# Patient Record
Sex: FEMALE | Race: OTHER | HISPANIC OR LATINO | Employment: FULL TIME | URBAN - METROPOLITAN AREA
[De-identification: names, ages, dates, MRNs, and addresses within clinical notes are randomized per-mention and may not be internally consistent; named-entity substitution may affect disease eponyms.]

---

## 2018-07-19 ENCOUNTER — HOSPITAL ENCOUNTER (EMERGENCY)
Facility: HOSPITAL | Age: 28
Discharge: HOME | End: 2018-07-19
Admitting: EMERGENCY MEDICINE
Payer: COMMERCIAL

## 2018-07-19 VITALS
SYSTOLIC BLOOD PRESSURE: 124 MMHG | DIASTOLIC BLOOD PRESSURE: 75 MMHG | RESPIRATION RATE: 20 BRPM | HEART RATE: 90 BPM | BODY MASS INDEX: 22.67 KG/M2 | WEIGHT: 120 LBS | OXYGEN SATURATION: 99 % | TEMPERATURE: 99.3 F

## 2018-07-19 DIAGNOSIS — K62.89 ANAL OR RECTAL PAIN: ICD-10-CM

## 2018-07-19 DIAGNOSIS — E87.6 HYPOKALEMIA: ICD-10-CM

## 2018-07-19 DIAGNOSIS — K62.5 BRBPR (BRIGHT RED BLOOD PER RECTUM): Primary | ICD-10-CM

## 2018-07-19 DIAGNOSIS — K64.4 HEMORRHOIDS, EXTERNAL WITHOUT COMPLICATIONS: ICD-10-CM

## 2018-07-19 DIAGNOSIS — D64.9 ANEMIA: ICD-10-CM

## 2018-07-19 DIAGNOSIS — Z87.19 HISTORY OF HEMORRHOIDS: ICD-10-CM

## 2018-07-19 LAB
ANION GAP SERPL CALCULATED.3IONS-SCNC: 6 MMOL/L (ref 4–13)
BASOPHILS # BLD AUTO: 0.05 THOUSANDS/ΜL (ref 0–0.1)
BASOPHILS NFR BLD AUTO: 1 % (ref 0–1)
BUN SERPL-MCNC: 10 MG/DL (ref 5–25)
CALCIUM SERPL-MCNC: 9.2 MG/DL (ref 8.3–10.1)
CHLORIDE SERPL-SCNC: 102 MMOL/L (ref 100–108)
CO2 SERPL-SCNC: 29 MMOL/L (ref 21–32)
CREAT SERPL-MCNC: 0.82 MG/DL (ref 0.6–1.3)
EOSINOPHIL # BLD AUTO: 0.08 THOUSAND/ΜL (ref 0–0.61)
EOSINOPHIL NFR BLD AUTO: 2 % (ref 0–6)
ERYTHROCYTE [DISTWIDTH] IN BLOOD BY AUTOMATED COUNT: 16.6 % (ref 11.6–15.1)
EXT PREG TEST URINE: NEGATIVE
GFR SERPL CREATININE-BSD FRML MDRD: 98 ML/MIN/1.73SQ M
GLUCOSE SERPL-MCNC: 100 MG/DL (ref 65–140)
HCT VFR BLD AUTO: 30.8 % (ref 34.8–46.1)
HGB BLD-MCNC: 9.2 G/DL (ref 11.5–15.4)
LYMPHOCYTES # BLD AUTO: 2.5 THOUSANDS/ΜL (ref 0.6–4.47)
LYMPHOCYTES NFR BLD AUTO: 47 % (ref 14–44)
MCH RBC QN AUTO: 20.7 PG (ref 26.8–34.3)
MCHC RBC AUTO-ENTMCNC: 29.9 G/DL (ref 31.4–37.4)
MCV RBC AUTO: 69 FL (ref 82–98)
MONOCYTES # BLD AUTO: 0.53 THOUSAND/ΜL (ref 0.17–1.22)
MONOCYTES NFR BLD AUTO: 10 % (ref 4–12)
NEUTROPHILS # BLD AUTO: 2.16 THOUSANDS/ΜL (ref 1.85–7.62)
NEUTS SEG NFR BLD AUTO: 41 % (ref 43–75)
NRBC BLD AUTO-RTO: 0 /100 WBCS
PLATELET # BLD AUTO: 261 THOUSANDS/UL (ref 149–390)
PMV BLD AUTO: 9 FL (ref 8.9–12.7)
POTASSIUM SERPL-SCNC: 3.1 MMOL/L (ref 3.5–5.3)
RBC # BLD AUTO: 4.45 MILLION/UL (ref 3.81–5.12)
SODIUM SERPL-SCNC: 137 MMOL/L (ref 136–145)
WBC # BLD AUTO: 5.32 THOUSAND/UL (ref 4.31–10.16)

## 2018-07-19 PROCEDURE — 36415 COLL VENOUS BLD VENIPUNCTURE: CPT | Performed by: PHYSICIAN ASSISTANT

## 2018-07-19 PROCEDURE — 80048 BASIC METABOLIC PNL TOTAL CA: CPT | Performed by: PHYSICIAN ASSISTANT

## 2018-07-19 PROCEDURE — 81025 URINE PREGNANCY TEST: CPT | Performed by: PHYSICIAN ASSISTANT

## 2018-07-19 PROCEDURE — 99285 EMERGENCY DEPT VISIT HI MDM: CPT

## 2018-07-19 PROCEDURE — 85025 COMPLETE CBC W/AUTO DIFF WBC: CPT | Performed by: PHYSICIAN ASSISTANT

## 2018-07-19 RX ORDER — POTASSIUM CHLORIDE 20MEQ/15ML
40 LIQUID (ML) ORAL ONCE
Status: COMPLETED | OUTPATIENT
Start: 2018-07-19 | End: 2018-07-19

## 2018-07-19 RX ADMIN — POTASSIUM CHLORIDE 40 MEQ: 20 SOLUTION ORAL at 21:06

## 2018-07-19 NOTE — ED PROVIDER NOTES
History  Chief Complaint   Patient presents with    Rectal Bleeding     "I know i have internal and external hemmoroids " "I have been bleeding with bowel movements since monday "       History provided by:  Spouse and patient   used: No    Rectal Bleeding - Minor   Quality:  Bright red  Amount: Moderate  Timing:  Intermittent  Chronicity:  Recurrent  Context: defecation, hemorrhoids, rectal pain and spontaneously    Context: not anal fissures, not anal penetration, not constipation, not diarrhea, not foreign body and not rectal injury    Pain details:     Quality:  Aching and burning    Severity:  Mild    Timing:  Constant    Progression:  Unchanged  Similar prior episodes: yes    Relieved by:  Hemorrhoid cream (Rectal suppository)  Worsened by:  Nothing  Ineffective treatments:  None tried  Associated symptoms: light-headedness    Associated symptoms: no abdominal pain, no fever, no recent illness and no vomiting    Associated symptoms comment:  One episode of lightheadedness yesterday  Risk factors: no anticoagulant use, no hx of colorectal cancer, no hx of colorectal surgery, no hx of IBD, no liver disease and no NSAID use        None       History reviewed  No pertinent past medical history  History reviewed  No pertinent surgical history  History reviewed  No pertinent family history  I have reviewed and agree with the history as documented  Social History   Substance Use Topics    Smoking status: Never Smoker    Smokeless tobacco: Never Used    Alcohol use No        Review of Systems   Constitutional: Negative for fever  HENT: Negative for dental problem and mouth sores  Eyes: Negative for pain  Respiratory: Negative for cough, choking, chest tightness and shortness of breath  Cardiovascular: Negative for chest pain and leg swelling  Gastrointestinal: Positive for blood in stool, hematochezia and rectal pain  Negative for abdominal pain and vomiting  Endocrine: Negative for polydipsia and polyphagia  Genitourinary: Positive for menstrual problem  Negative for dysuria, enuresis, flank pain, pelvic pain, vaginal bleeding, vaginal discharge and vaginal pain  Irregular menses  Musculoskeletal: Negative for arthralgias, back pain and neck stiffness  Skin: Negative for rash  Allergic/Immunologic: Negative for food allergies  Neurological: Positive for light-headedness  Hematological: Negative for adenopathy  Physical Exam  Physical Exam   Constitutional: She appears well-developed and well-nourished  No distress  HENT:   Head: Normocephalic and atraumatic  Eyes: Conjunctivae are normal    Neck: Neck supple  No JVD present  Cardiovascular: Normal rate and regular rhythm  Pulmonary/Chest: Effort normal  No respiratory distress  Respiratory rate for me was 16  No distress appreciated on exam    Abdominal: Soft  She exhibits no distension and no mass  There is no rebound and no guarding  Genitourinary: Rectal exam shows external hemorrhoid, tenderness and guaiac positive stool  Rectal exam shows no fissure, no mass and anal tone normal        Pelvic exam was performed with patient prone  Musculoskeletal: She exhibits no edema or deformity  Neurological: She is alert  Skin: Skin is warm and dry  Capillary refill takes less than 2 seconds  She is not diaphoretic  Psychiatric: She has a normal mood and affect  Nursing note and vitals reviewed        Vital Signs  ED Triage Vitals [07/19/18 1851]   Temperature Pulse Respirations Blood Pressure SpO2   99 3 °F (37 4 °C) 101 20 139/69 99 %      Temp src Heart Rate Source Patient Position - Orthostatic VS BP Location FiO2 (%)   -- Monitor -- Right arm --      Pain Score       3           Vitals:    07/19/18 1851 07/19/18 2047   BP: 139/69 124/75   Pulse: 101 90       Visual Acuity      ED Medications  Medications   potassium chloride 10 % oral solution 40 mEq (40 mEq Oral Given 7/19/18 2106)       Diagnostic Studies  Results Reviewed     Procedure Component Value Units Date/Time    Basic metabolic panel [[id number]]  (Abnormal) Collected:  07/19/18 1939    Lab Status:  Final result Specimen:  Blood from Arm, Right Updated:  07/19/18 1955     Sodium 137 mmol/L      Potassium 3 1 (L) mmol/L      Chloride 102 mmol/L      CO2 29 mmol/L      Anion Gap 6 mmol/L      BUN 10 mg/dL      Creatinine 0 82 mg/dL      Glucose 100 mg/dL      Calcium 9 2 mg/dL      eGFR 98 ml/min/1 73sq m     Narrative:         National Kidney Disease Education Program recommendations are as follows:  GFR calculation is accurate only with a steady state creatinine  Chronic Kidney disease less than 60 ml/min/1 73 sq  meters  Kidney failure less than 15 ml/min/1 73 sq  meters      CBC and differential [[id number]]  (Abnormal) Collected:  07/19/18 1939    Lab Status:  Final result Specimen:  Blood from Arm, Right Updated:  07/19/18 1946     WBC 5 32 Thousand/uL      RBC 4 45 Million/uL      Hemoglobin 9 2 (L) g/dL      Hematocrit 30 8 (L) %      MCV 69 (L) fL      MCH 20 7 (L) pg      MCHC 29 9 (L) g/dL      RDW 16 6 (H) %      MPV 9 0 fL      Platelets 539 Thousands/uL      nRBC 0 /100 WBCs      Neutrophils Relative 41 (L) %      Lymphocytes Relative 47 (H) %      Monocytes Relative 10 %      Eosinophils Relative 2 %      Basophils Relative 1 %      Neutrophils Absolute 2 16 Thousands/µL      Lymphocytes Absolute 2 50 Thousands/µL      Monocytes Absolute 0 53 Thousand/µL      Eosinophils Absolute 0 08 Thousand/µL      Basophils Absolute 0 05 Thousands/µL     POCT pregnancy, urine [[id number]]  (Normal) Resulted:  07/19/18 1942    Lab Status:  Final result Updated:  07/19/18 1942     EXT PREG TEST UR (Ref: Negative) negative                 No orders to display              Procedures  Procedures       Phone Contacts  ED Phone Contact    ED Course                               MDM  Number of Diagnoses or Management Options  Anal or rectal pain:   Anemia:   BRBPR (bright red blood per rectum):   Hemorrhoids, external without complications:   History of hemorrhoids:   Hypokalemia:   Diagnosis management comments: 18-year-old female presents emergency department for bright red blood per rectum ongoing for approximately a week  Patient does admit that the blood will be over the stool but will see some blood clots intermittently  Vital signs reviewed  I did repeat vital signs myself  Patient's heart rate is less than 100  Her respirations are less than 20 more like 16  On exam patient does have a nonthrombosed external hemorrhoid which is nontender  Patient does have some tenderness of the rectum more so para to the right  No ralph mass was appreciated  The Hemoccult was positive  Patient does have some labs in our system from 2015 but at this time cannot necessarily say that her recent anemia is secondary to blood clots or bleeding from the rectum  Patient does appear to have an issue with her menses as well  Labs do suggest chronicity of her anemia  Patient is hypokalemic and was provided [employer] [employer] or in the department  Patient encouraged to drink coconut water for potassium repletion  At this time patient is felt stable for discharge to home will provide outpatient follow-up with surgery and/or colorectal   Will also provide [facility] as well as [facility] information as patient new to [facility] system  Educated patient on persistent or worsening signs symptoms any symptoms of syncope or pre symptom go pee or any further issues to either follow up with primary care [facility] surgery and/or colorectal   Patient and male significant other admit to understanding and agreement  Patient was discharged in ambulatory stable condition         Amount and/or Complexity of Data Reviewed  Clinical lab tests: ordered and reviewed      CritCare Time    Disposition  Final diagnoses: BRBPR (bright red blood per rectum)   History of hemorrhoids   Hemorrhoids, external without complications   Anal or rectal pain   Anemia   Hypokalemia     Time reflects when diagnosis was documented in both MDM as applicable and the Disposition within this note     Time User Action Codes Description Comment    7/19/2018  9:01 PM Giorgi Bartlett Add [K62 5] BRBPR (bright red blood per rectum)     7/19/2018  9:01 PM Giorgi Bartlett Add [Z87 19] History of hemorrhoids     7/19/2018  9:02 PM Giorgi Bartlett Add [K64 4] Hemorrhoids, external without complications     7/47/1001  9:02 PM Giorgi Bartlett Add [K62 89] Anal or rectal pain     7/19/2018  9:02 PM Giorgi Bartlett Add [D64 9] Anemia     7/19/2018  9:02 PM Giorgi Bartlett Add [E87 6] Hypokalemia       ED Disposition     ED Disposition Condition Comment    Discharge  Susana Delacruz discharge to home/self care  Condition at discharge: Stable        Follow-up Information     Follow up With Specialties Details Why [address] [facility]   [address] [place] [address]  [phone]  [facility] [address] Obstetrics and Gynecology   [facility] [address] [id number]  [address] [place] [address], [address] [place] [address] [address]      Corrine Berkowitz MD Colon and Rectal Surgery   [address] [facility] [address]  [phone]            There are no discharge medications for this patient  No discharge procedures on file      ED Provider  Electronically Signed by           Karlene Rubio PA-C  07/19/18 2144

## 2018-07-20 NOTE — DISCHARGE INSTRUCTIONS
Anemia   WHAT YOU NEED TO KNOW:   Anemia is a low number of red blood cells or a low amount of hemoglobin in your red blood cells  Hemoglobin is a protein that helps carry oxygen throughout your body  Red blood cells use iron to create hemoglobin  Anemia may develop if your body does not have enough iron  It may also develop if your body does not make enough red blood cells or they die faster than your body can make them  DISCHARGE INSTRUCTIONS:   Call 911 or have someone call 911 for any of the following:   · You lose consciousness  · You have severe chest pain  Return to the emergency department if:   · You have dark or bloody bowel movements  Contact your healthcare provider if:   · Your symptoms are worse, even after treatment  · You have questions or concerns about your condition or care  Medicines:   · Iron or folic acid supplements  help increase your red blood cell and hemoglobin levels  · Vitamin B12 injections  may help boost your red blood cell level and decrease your symptoms  Ask your healthcare provider how to inject B12  · Take your medicine as directed  Contact your healthcare provider if you think your medicine is not helping or if you have side effects  Tell him of her if you are allergic to any medicine  Keep a list of the medicines, vitamins, and herbs you take  Include the amounts, and when and why you take them  Bring the list or the pill bottles to follow-up visits  Carry your medicine list with you in case of an emergency  Prevent anemia:  Eat healthy foods rich in iron and vitamin C  Nuts, meat, dark leafy green vegetables, and beans are high in iron and protein  Vitamin C helps your body absorb iron  Foods rich in vitamin C include oranges and other citrus fruits  Ask your healthcare provider for a list of other foods that are high in iron or vitamin C  Ask if you need to be on a special diet     Follow up with your healthcare provider as directed:  Write down your questions so you remember to ask them during your visits  © 2017 [address] [employer] Information is for End User's use only and may not be sold, redistributed or otherwise used for commercial purposes  All illustrations and images included in CareNotes® are the copyrighted property of [employer]  or Steve Macias  The above information is an  only  It is not intended as medical advice for individual conditions or treatments  Talk to your doctor, nurse or pharmacist before following any medical regimen to see if it is safe and effective for you  Hemorrhoids   WHAT YOU NEED TO KNOW:   Hemorrhoids are swollen blood vessels inside your rectum (internal hemorrhoids) or on your anus (external hemorrhoids)  Sometimes a hemorrhoid may prolapse  This means it extends out of your anus  DISCHARGE INSTRUCTIONS:   Return to the emergency department if:   · You have severe pain in your rectum or around your anus  · You have severe pain in your abdomen and you are vomiting  · You have bleeding from your anus that soaks through your underwear  Contact your healthcare provider if:   · You have frequent and painful bowel movements  · Your hemorrhoid looks or feels more swollen than usual      · You do not have a bowel movement for 2 days or more  · You see or feel tissue coming through your anus  · You have questions or concerns about your condition or care  Medicines: You may  need any of the following:  · A pad, cream, or ointment  can help decrease pain, swelling, and itching  · Stool softeners  help treat or prevent constipation  · NSAIDs , such as ibuprofen, help decrease swelling, pain, and fever  NSAIDs can cause stomach bleeding or kidney problems in certain people  If you take blood thinner medicine, always ask your healthcare provider if NSAIDs are safe for you  Always read the medicine label and follow directions      · Take your medicine as directed  Contact your healthcare provider if you think your medicine is not helping or if you have side effects  Tell him or her if you are allergic to any medicine  Keep a list of the medicines, vitamins, and herbs you take  Include the amounts, and when and why you take them  Bring the list or the pill bottles to follow-up visits  Carry your medicine list with you in case of an emergency  Manage your symptoms:   · Apply ice on your anus for 15 to 20 minutes every hour or as directed  Use an ice pack, or put crushed ice in a plastic bag  Cover it with a towel before you apply it to your anus  Ice helps prevent tissue damage and decreases swelling and pain  · Take a sitz bath  Fill a bathtub with 4 to 6 inches of warm water  You may also use a sitz bath pan that fits inside a toilet bowl  Sit in the sitz bath for 15 minutes  Do this 3 times a day, and after each bowel movement  The warm water can help decrease pain and swelling  · Keep your anal area clean  Gently wash the area with warm water daily  Soap may irritate the area  After a bowel movement, wipe with moist towelettes or wet toilet paper  Dry toilet paper can irritate the area  Prevent hemorrhoids:   · Do not strain to have a bowel movement  Do not sit on the toilet too long  These actions can increase pressure on the tissues in your rectum and anus  · Drink plenty of liquids  Liquids can help prevent constipation  Ask how much liquid to drink each day and which liquids are best for you  · Eat a variety of high-fiber foods  Examples include fruits, vegetables, and whole grains  Ask your healthcare provider how much fiber you need each day  You may need to take a fiber supplement  · Exercise as directed  Exercise, such as walking, may make it easier to have a bowel movement  Ask your healthcare provider to help you create an exercise plan  · Do not have anal sex    Anal sex can weaken the skin around your rectum and anus  · Avoid heavy lifting  This can cause straining and increase your risk for another hemorrhoid  Follow up with your healthcare provider as directed:  Write down your questions so you remember to ask them during your visits  © 2017 [facility] Information is for End User's use only and may not be sold, redistributed or otherwise used for commercial purposes  All illustrations and images included in CareNotes® are the copyrighted property of [employer]  or Steve Macias  The above information is an  only  It is not intended as medical advice for individual conditions or treatments  Talk to your doctor, nurse or pharmacist before following any medical regimen to see if it is safe and effective for you  Proctitis   WHAT YOU NEED TO KNOW:   Proctitis is a condition where you have inflammation of the lining of your rectum  The rectum is the last part of your large intestine that ends at your anus  If the inflammation continues into your colon, it is called proctolitis  Proctitis may be a short-term or long-term condition  DISCHARGE INSTRUCTIONS:   Medicines:   · Antibiotics: This medicine is given if a bacterial infection is causing your proctitis  Take them as directed  · Antiviral medicine: This medicine is given if a viral infection is causing your proctitis  · Antiinflammatory medicine: This medicine helps prevent swelling  · Antiulcer medicine: This is given as a pill, suppository, or enema to coat the bowel and help prevent further damage to the tissues  It may also help with tissue healing  · Steroids:  Steroid medicine helps decrease swelling  · Steroids: This medicine may be given to decrease redness, pain, and swelling  Follow up with your healthcare provider as directed: You may need to return for other tests  Write down your questions so you remember to ask them during your visits    Prevent proctitis:   · Ask about medicines to ease your symptoms:  If you have constipation, ask about fiber supplements  If you have trouble controlling your bowel, ask about stool-forming medicines  Ask about a good skin care product to use if the skin around your anus is irritated  · Ask about medicines to prevent proctitis:  If you are having radiation, these medicines may help prevent you from having proctitis after your therapy  · Practice safe sex:  Do not have sex with someone who has an STI  This includes oral or anal sex  Do not have sex while you or your partner is being treated for a STI  Use new a latex condom or proper barrier each time you have sex  · Get regular check-ups:  Have a regular sexual health check if you often change sexual partners  · Wash your hands often:  Use soap and water  Use gel hand cleanser when there is no soap and water available  This will prevent the spread of germs  Always wash your hands after you use the toilet, when you work with food, and before and after you have sex  Clean your toilet seats, water taps, and door handles often  Contact your healthcare provider if:   · You have bleeding or pain during or after sex  · You have signs and symptoms that are new, do not improve, or get worse  · You have questions or concerns about your condition or care  Return to the emergency department if:   · You have severe abdominal or rectal pain that does not go away  · You have blood, pus, or a foul-smelling discharge coming from your anus or rectum  · You have joint pain, swollen lymph nodes, or night sweats  · You have genital swelling or pain or unusual bleeding  · Your stools are black or have blood on them  © 2017 [facility] Information is for End User's use only and may not be sold, redistributed or otherwise used for commercial purposes   All illustrations and images included in CareNotes® are the copyrighted property of [employer]  or [facility] [employer]  The above information is an  only  It is not intended as medical advice for individual conditions or treatments  Talk to your doctor, nurse or pharmacist before following any medical regimen to see if it is safe and effective for you  Rectal Bleeding   WHAT YOU NEED TO KNOW:   Rectal bleeding can be caused by constipation, hemorrhoids, or anal fissures  It may also be caused by polyps, tumors, or medical conditions, such as colitis or diverticulitis  DISCHARGE INSTRUCTIONS:   Medicines:   · Pain medicine: You may be given medicine to take away or decrease pain  Do not wait until the pain is severe before you take your medicine  · Iron supplement:  Iron helps your body make more red blood cells  · Steroids: This medicine decreases inflammation in your rectum  It may be applied as a cream, ointment, or lotion  · Take your medicine as directed  Contact your healthcare provider if you think your medicine is not helping or if you have side effects  Tell him of her if you are allergic to any medicine  Keep a list of the medicines, vitamins, and herbs you take  Include the amounts, and when and why you take them  Bring the list or the pill bottles to follow-up visits  Carry your medicine list with you in case of an emergency  Follow up with your healthcare provider as directed:  Write down your questions so you remember to ask them during your visits  Drink liquids as directed:  Ask your healthcare provider how much liquid to drink each day and which liquids are best for you  This will help prevent dehydration and constipation  Contact your healthcare provider if:   · You have a fever  · Your rectal bleeding stopped for a time, but has started again  · You have nausea  · You have cold, sweaty, pale skin  · You have changes in your bowel movements, such as diarrhea  · You have questions or concerns about your condition or care    Return to the emergency department if:   · You are breathing faster than usual     · You are dizzy, lightheaded, or feel faint  · You are confused or cannot think clearly  · You urinate less than usual or not at all  · Your rectal bleeding is constant or heavy  · You have severe abdominal pain or cramping  © 2017 [address] [employer] Information is for End User's use only and may not be sold, redistributed or otherwise used for commercial purposes  All illustrations and images included in CareNotes® are the copyrighted property of [employer]  or Steve Macias  The above information is an  only  It is not intended as medical advice for individual conditions or treatments  Talk to your doctor, nurse or pharmacist before following any medical regimen to see if it is safe and effective for you  Rectal Pain   WHAT YOU NEED TO KNOW:   Rectal pain can be caused by a number of conditions, such as hemorrhoids, an abscess, trauma, or anal tear  Infection, muscle spasms, or anal intercourse can also cause rectal pain  DISCHARGE INSTRUCTIONS:   Medicines: You may need any of the following:  · NSAIDs , such as ibuprofen, help decrease swelling, pain, and fever  This medicine is available with or without a doctor's order  NSAIDs can cause stomach bleeding or kidney problems in certain people  If you take blood thinner medicine, always ask your healthcare provider if NSAIDs are safe for you  Always read the medicine label and follow directions  · Prescription pain medicine  may be given  Ask how to take this medicine safely  · Antibiotics  help treat or prevent a bacterial infection  · Bowel movement softeners  help soften your bowel movement  They help prevent straining and more damage to the area  · Take your medicine as directed  Contact your healthcare provider if you think your medicine is not helping or if you have side effects   Tell him or her if you are allergic to any medicine  Keep a list of the medicines, vitamins, and herbs you take  Include the amounts, and when and why you take them  Bring the list or the pill bottles to follow-up visits  Carry your medicine list with you in case of an emergency  Return to the emergency department if:   · You have severe pain  Contact your healthcare provider if:   · Your pain does not decrease after 1 to 2 days of treatment  · You cannot take the medicine prescribed for your condition  · You have questions or concerns about your condition or care  Take a sitz bath:  Fill a bathtub with 4 to 6 inches of warm water  You may also use a sitz bath pan that fits over a toilet  Sit in the sitz bath for 20 minutes  Do this 2 to 3 times a day, or as directed  The warm water can help decrease pain, muscle spasms, or swelling  Apply heat:  Apply a warm, moist compress on your anus for 20 to 30 minutes every 2 hours for as many days as directed  Heat helps decrease pain and muscle spasms  Eat high-fiber foods: This will help prevent constipation and soften your bowel movements  High-fiber foods include fruit, vegetables, whole-grain breads and cereals, and beans  A dietitian or healthcare provider can help you create a high-fiber meal plan  Drink liquids as directed: You may need to drink more liquid than usual to help soften your bowel movements  Ask how much liquid to drink each day and which liquids are best for you  Follow up with your healthcare provider as directed:  Write down your questions so you remember to ask them during your visits  © 2017 [address]  Information is for End User's use only and may not be sold, redistributed or otherwise used for commercial purposes  All illustrations and images included in CareNotes® are the copyrighted property of [employer]  or Steve Macias  The above information is an  only   It is not intended as medical advice for individual conditions or treatments  Talk to your doctor, nurse or pharmacist before following any medical regimen to see if it is safe and effective for you

## 2018-07-27 ENCOUNTER — ANESTHESIA EVENT (OUTPATIENT)
Dept: PERIOP | Facility: AMBULARY SURGERY CENTER | Age: 28
End: 2018-07-27
Payer: COMMERCIAL

## 2018-08-01 ENCOUNTER — ANESTHESIA (OUTPATIENT)
Dept: PERIOP | Facility: AMBULARY SURGERY CENTER | Age: 28
End: 2018-08-01
Payer: COMMERCIAL

## 2018-08-01 ENCOUNTER — HOSPITAL ENCOUNTER (EMERGENCY)
Facility: HOSPITAL | Age: 28
Discharge: HOME | End: 2018-08-01
Attending: EMERGENCY MEDICINE | Admitting: EMERGENCY MEDICINE
Payer: COMMERCIAL

## 2018-08-01 ENCOUNTER — HOSPITAL ENCOUNTER (OUTPATIENT)
Facility: AMBULARY SURGERY CENTER | Age: 28
Setting detail: OUTPATIENT SURGERY
Discharge: HOME | End: 2018-08-01
Attending: COLON & RECTAL SURGERY | Admitting: COLON & RECTAL SURGERY
Payer: COMMERCIAL

## 2018-08-01 VITALS
HEART RATE: 92 BPM | WEIGHT: 118 LBS | BODY MASS INDEX: 21.71 KG/M2 | DIASTOLIC BLOOD PRESSURE: 61 MMHG | TEMPERATURE: 97.2 F | HEIGHT: 62 IN | RESPIRATION RATE: 18 BRPM | SYSTOLIC BLOOD PRESSURE: 115 MMHG | OXYGEN SATURATION: 100 %

## 2018-08-01 VITALS
SYSTOLIC BLOOD PRESSURE: 102 MMHG | OXYGEN SATURATION: 98 % | DIASTOLIC BLOOD PRESSURE: 58 MMHG | RESPIRATION RATE: 16 BRPM | TEMPERATURE: 98.1 F | HEART RATE: 103 BPM

## 2018-08-01 DIAGNOSIS — R55 VASOVAGAL RESPONSE: ICD-10-CM

## 2018-08-01 DIAGNOSIS — R55 NEAR SYNCOPE: Primary | ICD-10-CM

## 2018-08-01 DIAGNOSIS — E86.0 DEHYDRATION: ICD-10-CM

## 2018-08-01 LAB
ANION GAP SERPL CALCULATED.3IONS-SCNC: 9 MMOL/L (ref 4–13)
ATRIAL RATE: 104 BPM
BACTERIA UR QL AUTO: ABNORMAL /HPF
BASOPHILS # BLD AUTO: 0.02 THOUSANDS/ΜL (ref 0–0.1)
BASOPHILS NFR BLD AUTO: 0 % (ref 0–1)
BILIRUB UR QL STRIP: NEGATIVE
BUN SERPL-MCNC: 11 MG/DL (ref 5–25)
CALCIUM SERPL-MCNC: 9.6 MG/DL (ref 8.3–10.1)
CHLORIDE SERPL-SCNC: 102 MMOL/L (ref 100–108)
CLARITY UR: CLEAR
CO2 SERPL-SCNC: 28 MMOL/L (ref 21–32)
COLOR UR: YELLOW
CREAT SERPL-MCNC: 0.95 MG/DL (ref 0.6–1.3)
EOSINOPHIL # BLD AUTO: 0.01 THOUSAND/ΜL (ref 0–0.61)
EOSINOPHIL NFR BLD AUTO: 0 % (ref 0–6)
ERYTHROCYTE [DISTWIDTH] IN BLOOD BY AUTOMATED COUNT: 18.5 % (ref 11.6–15.1)
EXT PREG TEST URINE: NEGATIVE
EXT PREGNANCY TEST URINE: NEGATIVE
GFR SERPL CREATININE-BSD FRML MDRD: 82 ML/MIN/1.73SQ M
GLUCOSE SERPL-MCNC: 104 MG/DL (ref 65–140)
GLUCOSE UR STRIP-MCNC: NEGATIVE MG/DL
HCT VFR BLD AUTO: 34.3 % (ref 34.8–46.1)
HGB BLD-MCNC: 10.3 G/DL (ref 11.5–15.4)
HGB UR QL STRIP.AUTO: NEGATIVE
KETONES UR STRIP-MCNC: NEGATIVE MG/DL
LEUKOCYTE ESTERASE UR QL STRIP: ABNORMAL
LYMPHOCYTES # BLD AUTO: 0.55 THOUSANDS/ΜL (ref 0.6–4.47)
LYMPHOCYTES NFR BLD AUTO: 4 % (ref 14–44)
MCH RBC QN AUTO: 21.5 PG (ref 26.8–34.3)
MCHC RBC AUTO-ENTMCNC: 30 G/DL (ref 31.4–37.4)
MCV RBC AUTO: 72 FL (ref 82–98)
MONOCYTES # BLD AUTO: 0.75 THOUSAND/ΜL (ref 0.17–1.22)
MONOCYTES NFR BLD AUTO: 6 % (ref 4–12)
NEUTROPHILS # BLD AUTO: 11.08 THOUSANDS/ΜL (ref 1.85–7.62)
NEUTS SEG NFR BLD AUTO: 89 % (ref 43–75)
NITRITE UR QL STRIP: NEGATIVE
NON-SQ EPI CELLS URNS QL MICRO: ABNORMAL /HPF
NRBC BLD AUTO-RTO: 0 /100 WBCS
P AXIS: 65 DEGREES
PH UR STRIP.AUTO: 8.5 [PH] (ref 4.5–8)
PLATELET # BLD AUTO: 284 THOUSANDS/UL (ref 149–390)
PMV BLD AUTO: 9.9 FL (ref 8.9–12.7)
POTASSIUM SERPL-SCNC: 3.7 MMOL/L (ref 3.5–5.3)
PR INTERVAL: 118 MS
PROT UR STRIP-MCNC: ABNORMAL MG/DL
QRS AXIS: 62 DEGREES
QRSD INTERVAL: 76 MS
QT INTERVAL: 332 MS
QTC INTERVAL: 436 MS
RBC # BLD AUTO: 4.8 MILLION/UL (ref 3.81–5.12)
RBC #/AREA URNS AUTO: ABNORMAL /HPF
SODIUM SERPL-SCNC: 139 MMOL/L (ref 136–145)
SP GR UR STRIP.AUTO: 1.02 (ref 1–1.03)
T WAVE AXIS: 9 DEGREES
UROBILINOGEN UR QL STRIP.AUTO: 0.2 E.U./DL
VENTRICULAR RATE: 104 BPM
WBC # BLD AUTO: 12.41 THOUSAND/UL (ref 4.31–10.16)
WBC #/AREA URNS AUTO: ABNORMAL /HPF

## 2018-08-01 PROCEDURE — 36415 COLL VENOUS BLD VENIPUNCTURE: CPT | Performed by: EMERGENCY MEDICINE

## 2018-08-01 PROCEDURE — 99285 EMERGENCY DEPT VISIT HI MDM: CPT

## 2018-08-01 PROCEDURE — 81025 URINE PREGNANCY TEST: CPT | Performed by: ANESTHESIOLOGY

## 2018-08-01 PROCEDURE — 85025 COMPLETE CBC W/AUTO DIFF WBC: CPT | Performed by: EMERGENCY MEDICINE

## 2018-08-01 PROCEDURE — 96360 HYDRATION IV INFUSION INIT: CPT

## 2018-08-01 PROCEDURE — 80048 BASIC METABOLIC PNL TOTAL CA: CPT | Performed by: EMERGENCY MEDICINE

## 2018-08-01 PROCEDURE — 93010 ELECTROCARDIOGRAM REPORT: CPT | Performed by: INTERNAL MEDICINE

## 2018-08-01 PROCEDURE — 81025 URINE PREGNANCY TEST: CPT | Performed by: EMERGENCY MEDICINE

## 2018-08-01 PROCEDURE — 81001 URINALYSIS AUTO W/SCOPE: CPT

## 2018-08-01 PROCEDURE — 93005 ELECTROCARDIOGRAM TRACING: CPT

## 2018-08-01 RX ORDER — SODIUM CHLORIDE, SODIUM LACTATE, POTASSIUM CHLORIDE, CALCIUM CHLORIDE 600; 310; 30; 20 MG/100ML; MG/100ML; MG/100ML; MG/100ML
INJECTION, SOLUTION INTRAVENOUS CONTINUOUS PRN
Status: DISCONTINUED | OUTPATIENT
Start: 2018-08-01 | End: 2018-08-01 | Stop reason: SURG

## 2018-08-01 RX ORDER — PROPOFOL 10 MG/ML
INJECTION, EMULSION INTRAVENOUS AS NEEDED
Status: DISCONTINUED | OUTPATIENT
Start: 2018-08-01 | End: 2018-08-01 | Stop reason: SURG

## 2018-08-01 RX ORDER — LIDOCAINE HYDROCHLORIDE 10 MG/ML
INJECTION, SOLUTION INFILTRATION; PERINEURAL AS NEEDED
Status: DISCONTINUED | OUTPATIENT
Start: 2018-08-01 | End: 2018-08-01 | Stop reason: SURG

## 2018-08-01 RX ADMIN — PROPOFOL 50 MG: 10 INJECTION, EMULSION INTRAVENOUS at 10:18

## 2018-08-01 RX ADMIN — LIDOCAINE HYDROCHLORIDE 50 MG: 10 INJECTION, SOLUTION INFILTRATION; PERINEURAL at 10:13

## 2018-08-01 RX ADMIN — PROPOFOL 100 MG: 10 INJECTION, EMULSION INTRAVENOUS at 10:13

## 2018-08-01 RX ADMIN — SODIUM CHLORIDE 1000 ML: 0.9 INJECTION, SOLUTION INTRAVENOUS at 17:46

## 2018-08-01 RX ADMIN — SODIUM CHLORIDE, SODIUM LACTATE, POTASSIUM CHLORIDE, AND CALCIUM CHLORIDE: .6; .31; .03; .02 INJECTION, SOLUTION INTRAVENOUS at 10:05

## 2018-08-01 RX ADMIN — PROPOFOL 50 MG: 10 INJECTION, EMULSION INTRAVENOUS at 10:15

## 2018-08-01 NOTE — ED NOTES
Pt states ate after colonoscopy today  After waking up from nap felt light headed, dizzy, and flush  Pt lowered self to ground   Pt denies LOC     Rosa Aranda RN  08/01/18 0948

## 2018-08-01 NOTE — ED PROVIDER NOTES
,  History  Chief Complaint   Patient presents with    Weakness - Generalized     c/o weakness following colonoscopy this morning  also c/o subjective fever since waking up from nap this afternoon  39-year-old female presents for evaluation of near syncopal episode  Patient states that she had a colonoscopy done earlier today and underwent general anesthesia  She states taht she has felt generally weak through out the day patient states that when she had a bowel movement prior to arrival she felt like she is going to faint  Patient states that after she had a bowel movement she would stand up  She states she felt increasing generalized weakness, tunnel vision, lightheadedness, like she was about to faint  She states the symptoms got gradually worse and resolved when she sat on the floor  She is currently asymptomatic  No history of similar symptoms in the past   Patient has no headache, focal neuro deficits or weakness, risk factors for DVT or pulmonary embolism, chest pain, shortness of breath, palpitations, dyspnea on exertion, abdominal pain, diarrhea, constipation, urinary complaints, vaginal bleeding or discharge  History provided by:  Patient      None       History reviewed  No pertinent past medical history  Past Surgical History:   Procedure Laterality Date    EXPLORATORY LAPAROTOMY         History reviewed  No pertinent family history  I have reviewed and agree with the history as documented  Social History   Substance Use Topics    Smoking status: Never Smoker    Smokeless tobacco: Never Used    Alcohol use No        Review of Systems   Constitutional: Negative for activity change, appetite change, fatigue and fever  HENT: Negative for congestion, dental problem, ear pain, rhinorrhea and sore throat  Eyes: Negative for pain and redness  Respiratory: Negative for chest tightness, shortness of breath and wheezing      Cardiovascular: Negative for chest pain and palpitations  Gastrointestinal: Positive for nausea  Negative for abdominal pain, blood in stool, constipation, diarrhea and vomiting  Endocrine: Negative for cold intolerance and heat intolerance  Genitourinary: Negative for dysuria, frequency and hematuria  Musculoskeletal: Negative for arthralgias and myalgias  Skin: Negative for color change, pallor and rash  Neurological: Positive for weakness and light-headedness  Negative for numbness  Hematological: Does not bruise/bleed easily  Psychiatric/Behavioral: Negative for agitation, hallucinations and suicidal ideas  Physical Exam  Physical Exam   Constitutional: She is oriented to person, place, and time  She appears well-developed and well-nourished  HENT:   Mouth/Throat: No oropharyngeal exudate  TMs normal bilaterally no pharyngeal erythema no rhinorrhea nontender palpation of sinuses, normal looking turbinates   Eyes: Conjunctivae and EOM are normal  Pupils are equal, round, and reactive to light  Neck: Normal range of motion  Neck supple  No meningeal signs   Cardiovascular: Normal rate, regular rhythm, normal heart sounds and intact distal pulses  Pulmonary/Chest: Effort normal and breath sounds normal  No respiratory distress  She has no wheezes  She has no rales  She exhibits no tenderness  Abdominal: Soft  Bowel sounds are normal  She exhibits no distension and no mass  There is no tenderness  No hernia  No cvat   Musculoskeletal: Normal range of motion  She exhibits no edema  Lymphadenopathy:     She has no cervical adenopathy  Neurological: She is alert and oriented to person, place, and time  No cranial nerve deficit    nml cerebellar exam, nml strength/sensation through out, nml gait  Skin: No rash noted  No erythema  No edema   Psychiatric: She has a normal mood and affect  Her behavior is normal    Nursing note and vitals reviewed        Vital Signs  ED Triage Vitals [08/01/18 1551]   Temperature Pulse Respirations Blood Pressure SpO2   98 1 °F (36 7 °C) 104 18 99/72 100 %      Temp Source Heart Rate Source Patient Position - Orthostatic VS BP Location FiO2 (%)   Oral -- -- -- --      Pain Score       7           Vitals:    08/01/18 1551   BP: 99/72   Pulse: 104       Visual Acuity  Visual Acuity      Most Recent Value   L Pupil Size (mm)  3   R Pupil Size (mm)  3          ED Medications  Medications   sodium chloride 0 9 % bolus 1,000 mL (1,000 mL Intravenous New Bag 8/1/18 1746)       Diagnostic Studies  Results Reviewed     Procedure Component Value Units Date/Time    Basic metabolic panel [[id number]] Collected:  08/01/18 1746    Lab Status:  Final result Specimen:  Blood from Arm, Left Updated:  08/01/18 1802     Sodium 139 mmol/L      Potassium 3 7 mmol/L      Chloride 102 mmol/L      CO2 28 mmol/L      Anion Gap 9 mmol/L      BUN 11 mg/dL      Creatinine 0 95 mg/dL      Glucose 104 mg/dL      Calcium 9 6 mg/dL      eGFR 82 ml/min/1 73sq m     Narrative:         National Kidney Disease Education Program recommendations are as follows:  GFR calculation is accurate only with a steady state creatinine  Chronic Kidney disease less than 60 ml/min/1 73 sq  meters  Kidney failure less than 15 ml/min/1 73 sq  meters      CBC and differential [[id number]]  (Abnormal) Collected:  08/01/18 1746    Lab Status:  Final result Specimen:  Blood from Arm, Left Updated:  08/01/18 1753     WBC 12 41 (H) Thousand/uL      RBC 4 80 Million/uL      Hemoglobin 10 3 (L) g/dL      Hematocrit 34 3 (L) %      MCV 72 (L) fL      MCH 21 5 (L) pg      MCHC 30 0 (L) g/dL      RDW 18 5 (H) %      MPV 9 9 fL      Platelets 615 Thousands/uL      nRBC 0 /100 WBCs      Neutrophils Relative 89 (H) %      Lymphocytes Relative 4 (L) %      Monocytes Relative 6 %      Eosinophils Relative 0 %      Basophils Relative 0 %      Neutrophils Absolute 11 08 (H) Thousands/µL      Lymphocytes Absolute 0 55 (L) Thousands/µL      Monocytes Absolute 0 75 Thousand/µL      Eosinophils Absolute 0 01 Thousand/µL      Basophils Absolute 0 02 Thousands/µL     Urine Microscopic [[id number]]  (Abnormal) Collected:  08/01/18 1713    Lab Status:  Final result Specimen:  Urine from Urine, Clean Catch Updated:  08/01/18 1721     RBC, UA None Seen /hpf      WBC, UA 1-2 (A) /hpf      Epithelial Cells Moderate (A) /hpf      Bacteria, UA Occasional /hpf     POCT pregnancy, urine [[id number]]  (Normal) Resulted:  08/01/18 1708    Lab Status:  Final result Updated:  08/01/18 1708     EXT PREG TEST UR (Ref: Negative) negative    POCT urinalysis dipstick [[id number]]  (Abnormal) Resulted:  08/01/18 1707    Lab Status:  Final result Specimen:  Urine from Urine, Other Updated:  08/01/18 1707    ED Urine Macroscopic [[id number]]  (Abnormal) Collected:  08/01/18 1713    Lab Status:  Final result Specimen:  Urine Updated:  08/01/18 1706     Color, UA Yellow     Clarity, UA Clear     pH, UA 8 5 (H)     Leukocytes, UA Trace (A)     Nitrite, UA Negative     Protein,  (2+) (A) mg/dl      Glucose, UA Negative mg/dl      Ketones, UA Negative mg/dl      Urobilinogen, UA 0 2 E U /dl      Bilirubin, UA Negative     Blood, UA Negative     Specific Gravity, UA 1 020    Narrative:       CLINITEK RESULT                 No orders to display              Procedures  Procedures       Phone Contacts  ED Phone Contact    ED Course  ED Course as of Aug 01 1818   Wed Aug 01, 2018   1758 Likely reactive  Pt has no diarrhea, aneroxia, or abdominal pain to suggest perforation, acute intra-abdominal pathology  WBC: (!) 12 41   1815 Patient's symptoms have improved  Patient is a mild likely a cytosis which is likely secondary to procedure earlier today  She has no history or exam findings suggest pneumonia, acute intra-abdominal pathology, a negative urine  Patient likely suffering from vasovagal near-syncope  Patient will be discharged home with reassurance, counseling, primary care physician follow-up  MDM  Number of Diagnoses or Management Options  Diagnosis management comments: Near syncope with absence of neuro comp,laints, abdominal apin and with a benign exam-will do cardiac work up, ivfs, orthostatis,c reassess     CritCare Time    Disposition  Final diagnoses:   Near syncope   Vasovagal response   Dehydration     Time reflects when diagnosis was documented in both MDM as applicable and the Disposition within this note     Time User Action Codes Description Comment    8/1/2018  6:16 PM Nabil Simpers Add [R55] Near syncope     8/1/2018  6:16 PM Nabil Simpers Add [R55] Vasovagal response     8/1/2018  6:16 PM Diaz Vaughn Add [E86 0] Dehydration       ED Disposition     ED Disposition Condition Comment    Discharge  Donnel Brunner discharge to home/self care  Condition at discharge: Good        Follow-up Information     Follow up With Specialties Details Why Contact Info    [employer]  Schedule an appointment as soon as possible for a visit in 2 days  [phone]            Patient's Medications    No medications on file     No discharge procedures on file      ED Provider  Electronically Signed by           Lawrence Chua MD  08/01/18 3175

## 2018-08-01 NOTE — H&P
History and Physical   Colon and Rectal Surgery   Cb Turcios 28 y o  female MRN: [id number]  Unit/Bed#: OR [place] Encounter: [id number]  08/01/18   @NOW    No chief complaint on file  History of Present Illness   HPI:  Cb Turcios is a 28 y o  female who presents for evaluation of rectal bleeding  *    Historical Information   No past medical history on file  No past surgical history on file  Meds/Allergies     No prescriptions prior to admission  No current facility-administered medications for this encounter  No Known Allergies      Social History   History   Alcohol Use No     History   Drug Use No     History   Smoking Status    Never Smoker   Smokeless Tobacco    Never Used         Family History: No family history on file  Objective     Current Vitals:      No intake or output data in the 24 hours ending 08/01/18 0940    Physical Exam:  General:  Resting comfortably in bed   Eyes:Sclera anicteric  ENT: Trachea midline  Pulm:  Symmetric chest raise  No respiratory Distress  CV:  Regular on monitor  Abdomen:  Soft NT ND  Extremities:  No clubbing/ cyanosis/ edema    Lab Results: I have personally reviewed pertinent lab results  Imaging: I have personally reviewed pertinent reports  ASSESSMENT:  Cb Turcios is a 28 y o  female who presents for outpatient colonoscopy  PLAN:  For colonoscopy    Risks/ Benefits reviewed to include but not limited to anesthesia, bleeding, missed lesions, and colonoscopic perforation requiring surgery

## 2018-08-01 NOTE — DISCHARGE INSTRUCTIONS
Colonoscopy   WHAT YOU NEED TO KNOW:   A colonoscopy is a procedure to examine the inside of your colon (intestine) with a scope  Polyps or tissue growths may have been removed during your colonoscopy  It is normal to feel bloated and to have some abdominal discomfort  You should be passing gas  If you have hemorrhoids or you had polyps removed, you may have a small amount of bleeding  DISCHARGE INSTRUCTIONS:   Seek care immediately if:   · You have a large amount of bright red blood in your bowel movements  · Your abdomen is hard and firm and you have severe pain  · You have sudden trouble breathing  Contact your healthcare provider if:   · You develop a rash or hives  · You have a fever within 24 hours of your procedure       · You have not had a bowel movement for 3 days after your procedure  · You have questions or concerns about your condition or care  Activity:   · Do not lift, strain, or run  for 3 days after your procedure  · Rest after your procedure  You have been given medicine to relax you  Do not  drive or make important decisions until the day after your procedure  Return to your normal activity as directed  · Relieve gas and discomfort from bloating  by lying on your right side with a heating pad on your abdomen  You may need to take short walks to help the gas move out  Eat small meals until bloating is relieved  If you had polyps removed: For 7 days after your procedure:  · Do not  take aspirin  · Do not  go on long car rides  Follow up with your healthcare provider as directed:  Write down your questions so you remember to ask them during your visits  © 2017 [employer] is for End User's use only and may not be sold, redistributed or otherwise used for commercial purposes  All illustrations and images included in CareNotes® are the copyrighted property of [employer]  or Steve Mac[employer]    The above information is an  only  It is not intended as medical advice for individual conditions or treatments  Talk to your doctor, nurse or pharmacist before following any medical regimen to see if it is safe and effective for you

## 2018-08-01 NOTE — ANESTHESIA PREPROCEDURE EVALUATION
Review of Systems/Medical History      No history of anesthetic complications     Cardiovascular  Negative cardio ROS    Pulmonary  Negative pulmonary ROS        GI/Hepatic      Comment: Rectal bleeding     Negative  ROS        Endo/Other  Negative endo/other ROS      GYN  Negative gynecology ROS          Hematology  Negative hematology ROS      Musculoskeletal  Negative musculoskeletal ROS        Neurology  Negative neurology ROS      Psychology           Physical Exam    Airway       Dental       Cardiovascular  Comment: Negative ROS,     Pulmonary      Other Findings        Anesthesia Plan  ASA Score- 1     Anesthesia Type- IV sedation with anesthesia with ASA Monitors  Additional Monitors:   Airway Plan:     Comment: IV sedation,  standard ASA monitors  Risks and benefits discussed with patient; patient consented and agrees to proceed  I saw and evaluated the patient  If seen with CRNA, we have discussed the anesthetic plan and I am in agreement that the plan is appropriate for the patient  upt neg 8/1/18  Plan Factors-    Induction- intravenous  Postoperative Plan-     Informed Consent- Anesthetic plan and risks discussed with patient

## 2018-08-01 NOTE — OP NOTE
Navi All 28 y o  female MRN: [id number]  :1990  Unit/Bed#: OR [facility] Encounter: [id number]  18   @NOW    PCP: No primary care provider on file  COLONOSCOPY    PROCEDURE: Colonoscopy/     INDICATIONS: Rectal Bleeding    POST-OP DIAGNOSIS: See the impression below    SEDATION: Monitored anesthesia care, check anesthesia records    PHYSICAL EXAM:    LMP 2018   There is no height or weight on file to calculate BMI  General: NAD  Heart: S1 & S2 normal, RRR  Lungs: CTA, No rales or rhonchi  Abdomen: Soft, nontender, nondistended, good bowel sounds    CONSENT:  Informed consent was obtained for the procedure, including sedation after explaining the risks and benefits of the procedure  Risks including but not limited to bleeding, perforation, infection, aspiration were discussed in detail  Also explained about less than 100%$ sensitivity with the exam and other alternatives  PREPARATION:   EKG tracing, pulse oximetry, blood pressure were monitored throughout the procedure  Patient was identified by myself both verbally and by visual inspection of ID band  DESCRIPTION:   Patient was placed in the left lateral decubitus position and was sedated with the above medication  Digital rectal examination was performed  The colonoscope was introduced in to the anal canal and advanced up to cecum, which was identified by the terminal ileum  A careful inspection was made as the colonoscope was withdrawn, including a retroflexed view of the rectum; findings and interventions are described below  Appropriate photodocumentation was obtained  The quality of the colonic preparation was good  FINDINGS:    Internal hemorrhoids  Ileocolic anastomosis that is widely patent  IMPRESSIONS:      Evidence of a prior right-sided colon surgery  Mixed internal and external hemorrhoids    RECOMMENDATIONS:    Discharge to home  High-fiber diet    Return to office in 2-4 weeks to discuss hemorrhoid therapy  COMPLICATIONS:  None; patient tolerated the procedure well      DISPOSITION: PACU           CONDITION: Stable

## 2018-08-01 NOTE — DISCHARGE INSTRUCTIONS
Near Syncope   WHAT YOU NEED TO KNOW:   Near syncope, also called presyncope, is the feeling that you may faint (lose consciousness), but you do not  You can control some health conditions that cause near syncope  Your healthcare providers can help you create a plan to manage near syncope and prevent episodes  DISCHARGE INSTRUCTIONS:   Seek care immediately if:   · You have sudden chest pain  · You have trouble breathing or shortness of breath  · You have vision changes, are sweating, and have nausea while you are sitting or lying down  · You feel dizzy or flushed and your heart is fluttering  · You lose consciousness  · You cannot use your arm, hand, foot, or leg, or it feels weak  · You have trouble speaking or understanding others when they speak  Contact your healthcare provider if:   · You have new or worsening symptoms  · Your heart beats faster or slower than usual      · You have questions or concerns about your condition or care  Follow up with your healthcare provider as directed: You may need more tests to help find the cause of your near syncope episodes  The tests will help healthcare providers plan the best treatment for you  Write down your questions so you remember to ask them during your visits  Manage near syncope:   · Sit or lie down when needed  This includes when you feel dizzy, your throat is getting tight, and your vision changes  Raise your legs above the level of your heart  · Take slow, deep breaths if you start to breathe faster with anxiety or fear  This can help decrease dizziness and the feeling that you might faint  · Keep a record of your near syncope episodes  Include your symptoms and your activity before and after the episode  The record can help your healthcare provider find the cause of your near syncope and help you manage episodes    Prevent a near syncope episode:   · Move slowly and let yourself get used to one position before you move to another position  This is very important when you change from a lying or sitting position to a standing position  Take some deep breaths before you stand up from a lying position  Stand up slowly  Sudden movements may cause a fainting spell  Sit on the side of the bed or couch for a few minutes before you stand up  If you are on bedrest, try to be upright for about 2 hours each day, or as directed  Do not lock your legs if you are standing for a long period of time  Move your legs and bend your knees to keep blood flowing  · Follow your healthcare provider's recommendations  Your provider may  recommend that you drink more liquids to prevent dehydration  You may also need to have more salt to keep your blood pressure from dropping too low and causing syncope  Your provider will tell you how much liquid and sodium to have each day  · Watch for signs of low blood sugar  These include hunger, nervousness, sweating, and fast or fluttery heartbeats  Talk with your healthcare provider about ways to keep your blood sugar level steady  · Check your blood pressure often  This is important if you take medicine to lower your blood pressure  Check your blood pressure when you are lying down and when you are standing  Ask how often to check during the day  Keep a record of your blood pressure numbers  Your healthcare provider may use the record to help plan your treatment  · Do not strain if you are constipated  You may faint if you strain to have a bowel movement  Walking is the best way to get your bowels moving  Eat foods high in fiber to make it easier to have a bowel movement  Good examples are high-fiber cereals, beans, vegetables, and whole-grain breads  Prune juice may help make bowel movements softer  · Do not exercise outside on a hot day  The combination of physical activity and heat can lead to dehydration  This can cause syncope    © 2017 [employer] Information is for End User's use only and may not be sold, redistributed or otherwise used for commercial purposes  All illustrations and images included in CareNotes® are the copyrighted property of A [employer]  or Steve Macias  The above information is an  only  It is not intended as medical advice for individual conditions or treatments  Talk to your doctor, nurse or pharmacist before following any medical regimen to see if it is safe and effective for you

## 2019-12-23 ENCOUNTER — HOSPITAL ENCOUNTER (INPATIENT)
Facility: HOSPITAL | Age: 29
LOS: 1 days | Discharge: HOME | DRG: 812 | End: 2019-12-24
Attending: EMERGENCY MEDICINE | Admitting: INTERNAL MEDICINE
Payer: COMMERCIAL

## 2019-12-23 DIAGNOSIS — D75.839 THROMBOCYTOSIS: ICD-10-CM

## 2019-12-23 DIAGNOSIS — D64.9 ANEMIA: ICD-10-CM

## 2019-12-23 DIAGNOSIS — R55 SYNCOPE: Primary | ICD-10-CM

## 2019-12-23 PROBLEM — Z87.19 HISTORY OF HEMORRHOIDS: Status: ACTIVE | Noted: 2019-12-23

## 2019-12-23 LAB
ABO GROUP BLD: NORMAL
ABO GROUP BLD: NORMAL
ALBUMIN SERPL BCP-MCNC: 3.5 G/DL (ref 3.5–5)
ALP SERPL-CCNC: 72 U/L (ref 46–116)
ALT SERPL W P-5'-P-CCNC: 19 U/L (ref 12–78)
AMORPH PHOS CRY URNS QL MICRO: ABNORMAL /HPF
ANION GAP SERPL CALCULATED.3IONS-SCNC: 6 MMOL/L (ref 4–13)
AST SERPL W P-5'-P-CCNC: 17 U/L (ref 5–45)
ATRIAL RATE: 107 BPM
BACTERIA UR QL AUTO: ABNORMAL /HPF
BASOPHILS # BLD AUTO: 0.04 THOUSANDS/ΜL (ref 0–0.1)
BASOPHILS NFR BLD AUTO: 1 % (ref 0–1)
BILIRUB SERPL-MCNC: 1.33 MG/DL (ref 0.2–1)
BILIRUB UR QL STRIP: NEGATIVE
BLD GP AB SCN SERPL QL: NEGATIVE
BUN SERPL-MCNC: 12 MG/DL (ref 5–25)
CALCIUM SERPL-MCNC: 8.9 MG/DL (ref 8.3–10.1)
CHLORIDE SERPL-SCNC: 104 MMOL/L (ref 100–108)
CLARITY UR: ABNORMAL
CO2 SERPL-SCNC: 28 MMOL/L (ref 21–32)
COLOR UR: YELLOW
CREAT SERPL-MCNC: 0.71 MG/DL (ref 0.6–1.3)
EOSINOPHIL # BLD AUTO: 0.03 THOUSAND/ΜL (ref 0–0.61)
EOSINOPHIL NFR BLD AUTO: 1 % (ref 0–6)
ERYTHROCYTE [DISTWIDTH] IN BLOOD BY AUTOMATED COUNT: 32.7 % (ref 11.6–15.1)
EXT PREG TEST URINE: NEGATIVE
EXT. CONTROL ED NAV: NORMAL
GFR SERPL CREATININE-BSD FRML MDRD: 115 ML/MIN/1.73SQ M
GLUCOSE SERPL-MCNC: 100 MG/DL (ref 65–140)
GLUCOSE UR STRIP-MCNC: NEGATIVE MG/DL
HCT VFR BLD AUTO: 18.9 % (ref 34.8–46.1)
HCT VFR BLD AUTO: 23.1 % (ref 34.8–46.1)
HGB BLD-MCNC: 4.8 G/DL (ref 11.5–15.4)
HGB BLD-MCNC: 6.4 G/DL (ref 11.5–15.4)
HGB UR QL STRIP.AUTO: ABNORMAL
IMM GRANULOCYTES # BLD AUTO: 0.03 THOUSAND/UL (ref 0–0.2)
IMM GRANULOCYTES NFR BLD AUTO: 1 % (ref 0–2)
KETONES UR STRIP-MCNC: NEGATIVE MG/DL
LDH SERPL-CCNC: 239 U/L (ref 81–234)
LEUKOCYTE ESTERASE UR QL STRIP: ABNORMAL
LYMPHOCYTES # BLD AUTO: 0.84 THOUSANDS/ΜL (ref 0.6–4.47)
LYMPHOCYTES NFR BLD AUTO: 15 % (ref 14–44)
MCH RBC QN AUTO: 17 PG (ref 26.8–34.3)
MCHC RBC AUTO-ENTMCNC: 25.4 G/DL (ref 31.4–37.4)
MCV RBC AUTO: 67 FL (ref 82–98)
MONOCYTES # BLD AUTO: 0.5 THOUSAND/ΜL (ref 0.17–1.22)
MONOCYTES NFR BLD AUTO: 9 % (ref 4–12)
NEUTROPHILS # BLD AUTO: 4.22 THOUSANDS/ΜL (ref 1.85–7.62)
NEUTS SEG NFR BLD AUTO: 73 % (ref 43–75)
NITRITE UR QL STRIP: NEGATIVE
NON-SQ EPI CELLS URNS QL MICRO: ABNORMAL /HPF
NRBC BLD AUTO-RTO: 1 /100 WBCS
P AXIS: 21 DEGREES
PH UR STRIP.AUTO: 8.5 [PH] (ref 4.5–8)
PLATELET # BLD AUTO: 1268 THOUSANDS/UL (ref 149–390)
PMV BLD AUTO: 8.7 FL (ref 8.9–12.7)
POTASSIUM SERPL-SCNC: 3.5 MMOL/L (ref 3.5–5.3)
PR INTERVAL: 122 MS
PROT SERPL-MCNC: 8 G/DL (ref 6.4–8.2)
PROT UR STRIP-MCNC: ABNORMAL MG/DL
QRS AXIS: 54 DEGREES
QRSD INTERVAL: 80 MS
QT INTERVAL: 334 MS
QTC INTERVAL: 445 MS
RBC # BLD AUTO: 2.82 MILLION/UL (ref 3.81–5.12)
RBC #/AREA URNS AUTO: ABNORMAL /HPF
RH BLD: POSITIVE
RH BLD: POSITIVE
SODIUM SERPL-SCNC: 138 MMOL/L (ref 136–145)
SP GR UR STRIP.AUTO: 1.02 (ref 1–1.03)
SPECIMEN EXPIRATION DATE: NORMAL
T WAVE AXIS: -20 DEGREES
TROPONIN I SERPL-MCNC: <0.02 NG/ML
TSH SERPL DL<=0.05 MIU/L-ACNC: 2.15 UIU/ML (ref 0.36–3.74)
UROBILINOGEN UR QL STRIP.AUTO: 0.2 E.U./DL
VENTRICULAR RATE: 107 BPM
WBC # BLD AUTO: 5.66 THOUSAND/UL (ref 4.31–10.16)
WBC #/AREA URNS AUTO: ABNORMAL /HPF

## 2019-12-23 PROCEDURE — P9016 RBC LEUKOCYTES REDUCED: HCPCS

## 2019-12-23 PROCEDURE — 82272 OCCULT BLD FECES 1-3 TESTS: CPT

## 2019-12-23 PROCEDURE — 86901 BLOOD TYPING SEROLOGIC RH(D): CPT | Performed by: EMERGENCY MEDICINE

## 2019-12-23 PROCEDURE — 86920 COMPATIBILITY TEST SPIN: CPT

## 2019-12-23 PROCEDURE — 84443 ASSAY THYROID STIM HORMONE: CPT | Performed by: EMERGENCY MEDICINE

## 2019-12-23 PROCEDURE — 81025 URINE PREGNANCY TEST: CPT | Performed by: EMERGENCY MEDICINE

## 2019-12-23 PROCEDURE — 36415 COLL VENOUS BLD VENIPUNCTURE: CPT | Performed by: EMERGENCY MEDICINE

## 2019-12-23 PROCEDURE — 93005 ELECTROCARDIOGRAM TRACING: CPT

## 2019-12-23 PROCEDURE — 85025 COMPLETE CBC W/AUTO DIFF WBC: CPT | Performed by: EMERGENCY MEDICINE

## 2019-12-23 PROCEDURE — 93010 ELECTROCARDIOGRAM REPORT: CPT

## 2019-12-23 PROCEDURE — 85018 HEMOGLOBIN: CPT | Performed by: INTERNAL MEDICINE

## 2019-12-23 PROCEDURE — 30233N1 TRANSFUSION OF NONAUTOLOGOUS RED BLOOD CELLS INTO PERIPHERAL VEIN, PERCUTANEOUS APPROACH: ICD-10-PCS | Performed by: INTERNAL MEDICINE

## 2019-12-23 PROCEDURE — 83010 ASSAY OF HAPTOGLOBIN QUANT: CPT | Performed by: INTERNAL MEDICINE

## 2019-12-23 PROCEDURE — 99223 1ST HOSP IP/OBS HIGH 75: CPT | Performed by: NURSE PRACTITIONER

## 2019-12-23 PROCEDURE — 85014 HEMATOCRIT: CPT | Performed by: INTERNAL MEDICINE

## 2019-12-23 PROCEDURE — 81001 URINALYSIS AUTO W/SCOPE: CPT

## 2019-12-23 PROCEDURE — 83615 LACTATE (LD) (LDH) ENZYME: CPT | Performed by: INTERNAL MEDICINE

## 2019-12-23 PROCEDURE — 96360 HYDRATION IV INFUSION INIT: CPT

## 2019-12-23 PROCEDURE — 80053 COMPREHEN METABOLIC PANEL: CPT | Performed by: EMERGENCY MEDICINE

## 2019-12-23 PROCEDURE — 99291 CRITICAL CARE FIRST HOUR: CPT | Performed by: EMERGENCY MEDICINE

## 2019-12-23 PROCEDURE — 99223 1ST HOSP IP/OBS HIGH 75: CPT | Performed by: INTERNAL MEDICINE

## 2019-12-23 PROCEDURE — 84484 ASSAY OF TROPONIN QUANT: CPT | Performed by: INTERNAL MEDICINE

## 2019-12-23 PROCEDURE — 99285 EMERGENCY DEPT VISIT HI MDM: CPT

## 2019-12-23 PROCEDURE — 84484 ASSAY OF TROPONIN QUANT: CPT | Performed by: EMERGENCY MEDICINE

## 2019-12-23 PROCEDURE — 86900 BLOOD TYPING SEROLOGIC ABO: CPT | Performed by: EMERGENCY MEDICINE

## 2019-12-23 PROCEDURE — 86850 RBC ANTIBODY SCREEN: CPT | Performed by: EMERGENCY MEDICINE

## 2019-12-23 RX ORDER — MULTIVITAMIN
1 TABLET ORAL DAILY
COMMUNITY
End: 2020-12-22 | Stop reason: ALTCHOICE

## 2019-12-23 RX ORDER — SODIUM CHLORIDE 9 MG/ML
100 INJECTION, SOLUTION INTRAVENOUS CONTINUOUS
Status: DISCONTINUED | OUTPATIENT
Start: 2019-12-23 | End: 2019-12-24

## 2019-12-23 RX ORDER — ACETAMINOPHEN 325 MG/1
650 TABLET ORAL EVERY 6 HOURS PRN
Status: DISCONTINUED | OUTPATIENT
Start: 2019-12-23 | End: 2019-12-24

## 2019-12-23 RX ORDER — ONDANSETRON 2 MG/ML
4 INJECTION INTRAMUSCULAR; INTRAVENOUS EVERY 4 HOURS PRN
Status: DISCONTINUED | OUTPATIENT
Start: 2019-12-23 | End: 2019-12-24

## 2019-12-23 RX ADMIN — SODIUM CHLORIDE 1000 ML: 0.9 INJECTION, SOLUTION INTRAVENOUS at 10:59

## 2019-12-23 RX ADMIN — SODIUM CHLORIDE 100 ML/HR: 0.9 INJECTION, SOLUTION INTRAVENOUS at 14:03

## 2019-12-23 RX ADMIN — ZINC 1 TABLET: TAB ORAL at 14:03

## 2019-12-23 NOTE — ASSESSMENT & PLAN NOTE
- s/p colonoscopy over a year ago with recommendation for surgical intervention which she refused at the time  - maintained on clear liquids - if continued drop in hemoglobin and/or bloody bowels noted, will need to revisit  - await gastroenterology evaluation

## 2019-12-23 NOTE — PLAN OF CARE
Problem: Potential for Falls  Goal: Patient will remain free of falls  Description  INTERVENTIONS:  - Assess patient frequently for physical needs  -  Identify cognitive and physical deficits and behaviors that affect risk of falls    -  [place] fall precautions as indicated by assessment   - Educate patient/family on patient safety including physical limitations  - Instruct patient to call for assistance with activity based on assessment  - Modify environment to reduce risk of injury  - Consider OT/PT consult to assist with strengthening/mobility  Outcome: Progressing     Problem: PAIN - ADULT  Goal: Verbalizes/displays adequate comfort level or baseline comfort level  Description  Interventions:  - Encourage patient to monitor pain and request assistance  - Assess pain using appropriate pain scale  - Administer analgesics based on type and severity of pain and evaluate response  - Implement non-pharmacological measures as appropriate and evaluate response  - Consider cultural and social influences on pain and pain management  - Notify physician/advanced practitioner if interventions unsuccessful or patient reports new pain  Outcome: Progressing     Problem: INFECTION - ADULT  Goal: Absence or prevention of progression during hospitalization  Description  INTERVENTIONS:  - Assess and monitor for signs and symptoms of infection  - Monitor lab/diagnostic results  - Monitor all insertion sites, i e  indwelling lines, tubes, and drains  - Monitor endotracheal if appropriate and nasal secretions for changes in amount and color  - [place] appropriate cooling/warming therapies per order  - Administer medications as ordered  - Instruct and encourage patient and family to use good hand hygiene technique  - Identify and instruct in appropriate isolation precautions for identified infection/condition  Outcome: Progressing  Goal: Absence of fever/infection during neutropenic period  Description  INTERVENTIONS:  - Monitor WBC    Outcome: Progressing     Problem: SAFETY ADULT  Goal: Maintain or return to baseline ADL function  Description  INTERVENTIONS:  -  Assess patient's ability to carry out ADLs; assess patient's baseline for ADL function and identify physical deficits which impact ability to perform ADLs (bathing, care of mouth/teeth, toileting, grooming, dressing, etc )  - Assess/evaluate cause of self-care deficits   - Assess range of motion  - Assess patient's mobility; develop plan if impaired  - Assess patient's need for assistive devices and provide as appropriate  - Encourage maximum independence but intervene and supervise when necessary  - Involve family in performance of ADLs  - Assess for home care needs following discharge   - Consider OT consult to assist with ADL evaluation and planning for discharge  - Provide patient education as appropriate  Outcome: Progressing  Goal: Maintain or return mobility status to optimal level  Description  INTERVENTIONS:  - Assess patient's baseline mobility status (ambulation, transfers, stairs, etc )    - Identify cognitive and physical deficits and behaviors that affect mobility  - Identify mobility aids required to assist with transfers and/or ambulation (gait belt, sit-to-stand, lift, walker, cane, etc )  - [place] fall precautions as indicated by assessment  - Record patient progress and toleration of activity level on Mobility SBAR; progress patient to next Phase/Stage  - Instruct patient to call for assistance with activity based on assessment  - Consider rehabilitation consult to assist with strengthening/weightbearing, etc   Outcome: Progressing     Problem: DISCHARGE PLANNING  Goal: Discharge to home or other facility with appropriate resources  Description  INTERVENTIONS:  - Identify barriers to discharge w/patient and caregiver  - Arrange for needed discharge resources and transportation as appropriate  - Identify discharge learning needs (meds, wound care, etc )  - Arrange for interpretive services to assist at discharge as needed  - Refer to Case Management Department for coordinating discharge planning if the patient needs post-hospital services based on physician/advanced practitioner order or complex needs related to functional status, cognitive ability, or social support system  Outcome: Progressing     Problem: Knowledge Deficit  Goal: Patient/family/caregiver demonstrates understanding of disease process, treatment plan, medications, and discharge instructions  Description  Complete learning assessment and assess knowledge base    Interventions:  - Provide teaching at level of understanding  - Provide teaching via preferred learning methods  Outcome: Progressing     Problem: CARDIOVASCULAR - ADULT  Goal: Maintains optimal cardiac output and hemodynamic stability  Description  INTERVENTIONS:  - Monitor I/O, vital signs and rhythm  - Monitor for S/S and trends of decreased cardiac output  - Administer and titrate ordered vasoactive medications to optimize hemodynamic stability  - Assess quality of pulses, skin color and temperature  - Assess for signs of decreased coronary artery perfusion  - Instruct patient to report change in severity of symptoms  Outcome: Progressing  Goal: Absence of cardiac dysrhythmias or at baseline rhythm  Description  INTERVENTIONS:  - Continuous cardiac monitoring, vital signs, obtain 12 lead EKG if ordered  - Administer antiarrhythmic and heart rate control medications as ordered  - Monitor electrolytes and administer replacement therapy as ordered  Outcome: Progressing     Problem: HEMATOLOGIC - ADULT  Goal: Maintains hematologic stability  Description  INTERVENTIONS  - Assess for signs and symptoms of bleeding or hemorrhage  - Monitor labs  - Administer supportive blood products/factors as ordered and appropriate  Outcome: Progressing

## 2019-12-23 NOTE — CONSULTS
Oncology Consult Note  Rima Connell 33 y o  female MRN: [id number]  Unit/Bed#: E5 -01 Encounter: [id number]      Presenting Complaint:  Anemia and thrombocytosis    History of Presenting Illness: The patient is a 23-year-old female who was seen in the emergency room today after having a syncopal episode at home  She was found to have a hemoglobin of 4 8 with thrombocytosis  Platelet count is 9656  The patient notes that she has been passing blood with her stool over the last week and has a history of both external and internal hemorrhoids  She also notes that her family has been telling her to go to the emergency room this last week because she appeared pale/yellow but she waited until now  She did have a colonoscopy last year in August that confirms internal and external hemorrhoids  She does report that she has been taking iron on and off since then  She does not like to take it because it causes her GI upset  She reports normal menstrual cycles lasting about 4 days each month and she believes they are normal   She denies feeling more fatigued during her menses  She also reports that when she was an infant she had part of her intestines removed  As far as symptoms are concerned she does report fatigue and severe shortness of breath with activity  Denies shortness of breath at rest   Denies any hematuria  Denies chest pain, shortness of breath, nausea, vomiting, diarrhea, and fevers/infection  Review of Systems - As stated in the HPI otherwise the fourteen point review of systems was negative  History reviewed  No pertinent past medical history      Social History     Socioeconomic History    Marital status: /Civil Union     Spouse name: None    Number of children: None    Years of education: None    Highest education level: None   Occupational History    None   Social Needs    Financial resource strain: None    Food insecurity:     Worry: None     Inability: None    Transportation needs:     Medical: None     Non-medical: None   Tobacco Use    Smoking status: Never Smoker    Smokeless tobacco: Never Used   Substance and Sexual Activity    Alcohol use: No    Drug use: No    Sexual activity: None   Lifestyle    Physical activity:     Days per week: None     Minutes per session: None    Stress: None   Relationships    Social connections:     Talks on phone: None     Gets together: None     Attends [employer] service: None     Active member of club or organization: None     Attends meetings of clubs or organizations: None     Relationship status: None    Intimate partner violence:     Fear of current or ex partner: None     Emotionally abused: None     Physically abused: None     Forced sexual activity: None   Other Topics Concern    None   Social History Narrative    None       History reviewed  No pertinent family history  No Known Allergies      Current Facility-Administered Medications:     acetaminophen (TYLENOL) tablet 650 mg, 650 mg, Oral, Q6H PRN, Trinity Foster MD    multivitamin stress formula tablet 1 tablet, 1 tablet, Oral, Daily, Trinity Foster MD, 1 tablet at 12/23/19 1403    ondansetron (ZOFRAN) injection 4 mg, 4 mg, Intravenous, Q4H PRN, Trinity Foster MD    sodium chloride 0 9 % infusion, 100 mL/hr, Intravenous, Continuous, Trinity Foster MD, Last Rate: 100 mL/hr at 12/23/19 1403, 100 mL/hr at 12/23/19 1403      /56 (BP Location: Left arm)   Pulse (!) 110   Temp 99 3 °F (37 4 °C) (Temporal)   Resp 18   Wt 49 5 kg (109 lb 2 oz)   LMP 12/21/2019   SpO2 100%   BMI 19 96 kg/m²     General Appearance:    Alert, oriented        Eyes:    PERRL   Ears:    Normal external ear canals, both ears   Nose:   Nares normal, septum midline   Throat:   Mucosa moist  Pharynx without injection      Neck:   Supple       Lungs:     Clear to auscultation bilaterally   Chest Wall:    No tenderness or deformity    Heart:    Regular rate and rhythm       Abdomen: Soft, non-tender, bowel sounds +, no organomegaly           Extremities:   Extremities no cyanosis or edema       Skin:   no rash or icterus      Lymph nodes:   Cervical, supraclavicular, and axillary nodes normal   Neurologic:   CNII-XII intact, normal strength, sensation and reflexes     Throughout               Recent Results (from the past 48 hour(s))   ECG 12 lead    Collection Time: 12/23/19 10:19 AM   Result Value Ref Range    Ventricular Rate 107 BPM    Atrial Rate 107 BPM    MT Interval 122 ms    QRSD Interval 80 ms    QT Interval 334 ms    QTC Interval 445 ms    P [place] 21 degrees    QRS Axis 54 degrees    T Wave Axis -20 degrees   Urine Macroscopic, POC    Collection Time: 12/23/19 10:43 AM   Result Value Ref Range    Color, UA Yellow     Clarity, UA Cloudy     pH, UA 8 5 (H) 4 5 - 8 0    Leukocytes, UA Trace (A) Negative    Nitrite, UA Negative Negative    Protein,  (2+) (A) Negative mg/dl    Glucose, UA Negative Negative mg/dl    Ketones, UA Negative Negative mg/dl    Urobilinogen, UA 0 2 0 2, 1 0 E U /dl E U /dl    Bilirubin, UA Negative Negative    Blood, UA Trace (A) Negative    Specific Gravity, UA 1 020 1 003 - 1 030   Urine Microscopic    Collection Time: 12/23/19 10:43 AM   Result Value Ref Range    RBC, UA None Seen None Seen, 0-5 /hpf    WBC, UA 4-10 (A) None Seen, 0-5, 5-55, 5-65 /hpf    Epithelial Cells Occasional None Seen, Occasional /hpf    Bacteria, UA Moderate (A) None Seen, Occasional /hpf    AMORPH PHOSPATES Occasional /hpf   POCT pregnancy, urine    Collection Time: 12/23/19 10:57 AM   Result Value Ref Range    EXT PREG TEST UR (Ref: Negative) negative     Control valid    CBC and differential    Collection Time: 12/23/19 10:59 AM   Result Value Ref Range    WBC 5 66 4 31 - 10 16 Thousand/uL    RBC 2 82 (L) 3 81 - 5 12 Million/uL    Hemoglobin 4 8 (LL) 11 5 - 15 4 g/dL    Hematocrit 18 9 (L) 34 8 - 46 1 %    MCV 67 (L) 82 - 98 fL    MCH 17 0 (L) 26 8 - 34 3 pg    MCHC 25 4 (L) 31 4 - 37 4 g/dL    RDW 32 7 (H) 11 6 - 15 1 %    MPV 8 7 (L) 8 9 - 12 7 fL    Platelets 6,713 (HH) 149 - 390 Thousands/uL    nRBC 1 /100 WBCs    Neutrophils Relative 73 43 - 75 %    Immat GRANS % 1 0 - 2 %    Lymphocytes Relative 15 14 - 44 %    Monocytes Relative 9 4 - 12 %    Eosinophils Relative 1 0 - 6 %    Basophils Relative 1 0 - 1 %    Neutrophils Absolute 4 22 1 85 - 7 62 Thousands/µL    Immature Grans Absolute 0 03 0 00 - 0 20 Thousand/uL    Lymphocytes Absolute 0 84 0 60 - 4 47 Thousands/µL    Monocytes Absolute 0 50 0 17 - 1 22 Thousand/µL    Eosinophils Absolute 0 03 0 00 - 0 61 Thousand/µL    Basophils Absolute 0 04 0 00 - 0 10 Thousands/µL   Comprehensive metabolic panel    Collection Time: 12/23/19 10:59 AM   Result Value Ref Range    Sodium 138 136 - 145 mmol/L    Potassium 3 5 3 5 - 5 3 mmol/L    Chloride 104 100 - 108 mmol/L    CO2 28 21 - 32 mmol/L    ANION GAP 6 4 - 13 mmol/L    BUN 12 5 - 25 mg/dL    Creatinine 0 71 0 60 - 1 30 mg/dL    Glucose 100 65 - 140 mg/dL    Calcium 8 9 8 3 - 10 1 mg/dL    AST 17 5 - 45 U/L    ALT 19 12 - 78 U/L    Alkaline Phosphatase 72 46 - 116 U/L    Total Protein 8 0 6 4 - 8 2 g/dL    Albumin 3 5 3 5 - 5 0 g/dL    Total Bilirubin 1 33 (H) 0 20 - 1 00 mg/dL    eGFR 115 ml/min/1 73sq m   Troponin I    Collection Time: 12/23/19 10:59 AM   Result Value Ref Range    Troponin I <0 02 <=0 04 ng/mL   TSH    Collection Time: 12/23/19 10:59 AM   Result Value Ref Range    TSH 3RD GENERATON 2 151 0 358 - 3 740 uIU/mL   Lactate dehydrogenase    Collection Time: 12/23/19 10:59 AM   Result Value Ref Range     (H) 81 - 234 U/L   Type and screen    Collection Time: 12/23/19 11:49 AM   Result Value Ref Range    ABO Grouping O     Rh Factor Positive     Antibody Screen Negative     Specimen Expiration Date [id number]    ABO/Rh    Collection Time: 12/23/19 12:06 PM   Result Value Ref Range    ABO Grouping O     Rh Factor Positive    Prepare Leukoreduced RBC:Has consent been obtained? Yes, 3 Units    Collection Time: 12/23/19 12:55 PM   Result Value Ref Range    Unit Product Code W9925M08     Unit Number [id number]     Unit ABO O     Unit [facility] POS     Crossmatch Compatible     Unit Dispense Status Crossmatched     Unit Product Code P1389K69     Unit Number [id number]     Unit ABO O     Unit [facility] POS     Crossmatch Compatible     Unit Dispense Status Crossmatched     Unit Product Code G2476X48     Unit Number [id number]     Unit ABO O     Unit [facility] POS     Crossmatch Compatible     Unit Dispense Status Crossmatched    Troponin I    Collection Time: 12/23/19  2:22 PM   Result Value Ref Range    Troponin I <0 02 <=0 04 ng/mL         No results found  Assessment and Plan:  The patient is a 65-year-old female with a history of internal and external hemorrhoids who presented to the emergency room today with a syncopal episode and was found to have a hemoglobin of 4 8  She does have microcytic hypochromic anemia  Based off of her reports of continued blood loss with bowel movements I suspect this may be related to her internal/external hemorrhoids  Other causes may include hemolytic anemia  LDH is very slightly elevated at 239  Bilirubin is also very slightly elevated at 1 33  Haptoglobin is pending  Given that the LDH and bilirubin are only very slightly elevated we will wait to see if the haptoglobin is low before treating for hemolytic anemia  I would also recommend checking a reticulocyte count and indirect bilirubin  I recommend consulting Gastroenterology  I also agree with plan for transfusion of 3 units PRBCs and recommend to transfuse PRBCs to keep her hemoglobin greater than 7 5  She does also have thrombocytosis  This may be reactive to bleeding and iron deficiency  WBC has remained normal   I would recommend continued monitoring and if it does not improve we will consider myeloproliferative workup   Would not recommend baby aspirin at this time due to patient's report of bleeding hemorrhoids  She will need outpatient follow-up with us for iron infusions and follow-up regarding thrombocytosis  She has requested [facility] [place]  We will continue to follow this admission

## 2019-12-23 NOTE — ASSESSMENT & PLAN NOTE
- presents with a hemoglobin 4 8 and a syncopal episode at home (without head trauma) - ER ordered 3 units of PRBCs - serially monitor hemoglobin - stool for occult blood testing in the ER negative - associated thrombocytosis noted (see below)  - check iron panel - check folate/B12 levels - check hemolysis studies (haptoglobin/LDH) - normal TSH  - hematology to follow  - history of hemorrhoids noted with intermittent bright red blood per rectum per patient over the last week - await gastroenterology input  - also notes menstrual cycles are irregular, however, has normal flow  - positional dizziness with reflex tachycardia noted - check official orthostatics - continue IV fluids  - clear liquid diet for now  - if acute GI bleed suspected as etiology of anemia pending unremarkable above-mentioned workup, consider gastroenterology evaluation and CT imaging

## 2019-12-23 NOTE — H&P
History & Physical - [facility]  Patient: Tiana Owusu 33 y o  female MRN: [id number]  Unit/Bed#: E5 -01 Encounter: [id number]  Primary Care Provider: No primary care provider on file    Date & Time of Admission: 12/23/2019 10:07 AM        Assessment & Plan:    * Symptomatic anemia  Assessment & Plan  - presents with a hemoglobin 4 8 and a syncopal episode at home (without head trauma) - ER ordered 3 units of PRBCs - serially monitor hemoglobin - stool for occult blood testing in the ER negative - associated thrombocytosis noted (see below)  - check iron panel - check folate/B12 levels - check hemolysis studies (haptoglobin/LDH) - normal TSH  - hematology to follow  - history of hemorrhoids noted with intermittent bright red blood per rectum per patient over the last week - await gastroenterology input  - also notes menstrual cycles are irregular, however, has normal flow  - positional dizziness with reflex tachycardia noted - check official orthostatics - continue IV fluids  - clear liquid diet for now  - if acute GI bleed suspected as etiology of anemia pending unremarkable above-mentioned workup, consider gastroenterology evaluation and CT imaging    History of hemorrhoids  Assessment & Plan  - s/p colonoscopy over a year ago with recommendation for surgical intervention which she refused at the time  - maintained on clear liquids - if continued drop in hemoglobin and/or bloody bowels noted, will need to revisit  - await gastroenterology evaluation    Thrombocytosis  Assessment & Plan  - possibly reactive due to profound anemia vs alternate underlying hematologic etiology  - await hematology input with peripheral smear analysis - consider bone marrow biopsy (defer to hematology)  - monitor platelet count - was normal when last checked in August 2018      DVT Prophylaxis:  Ambulatory    Code Status:  Full code    Discussion with:  Patient and boyfriend at bedside    Anticipated Length of Stay:  Patient will be admitted on an Inpatient basis with an anticipated length of stay of greater than 2 midnights  Justification for Hospital Stay: Symptomatic anemia with profound thrombocytosis requiring hematologic workup and PRBC transfusion  Total Time for Visit, including Counseling / Coordination of Care: 72 minutes  Greater than 50% of this total time spent on direct patient counseling and coordination of care  Chief Complaint:  Syncopal episode with low hemoglobin      History of Present Illness:    Rima Connell is a 33 y o  female who presents with complaints of worsening shortness of breath coupled with a syncopal episode this morning without head trauma  In the ER, she was found to have a hemoglobin of 4 8 and PRBC transfusions have been ordered  The patient notes that over the last week, she has had episodes of blood in her bowels  She has a history of both external and internal hemorrhoids per her recollection and she last underwent a colonoscopy over a year ago, and at that time, was recommended to undergo surgical intervention which she refused  She vaguely remembers also being told that her Carlos Cowden was low  Upon my encounter, she is resting in bed fairly comfortably but still feels generally weak/fatigued  Her shortness of breath has improved at rest   Denies any family history of hematologic disorders  Denies any personal history of hematemesis, hemoptysis, epistaxis, or gum bleeding  Overall, she remains in positive and pleasant spirits  Denies any history of anti-platelet agent or anticoagulation use  Her boyfriend is present at bedside during my encounter  Review of Systems:    Review of Systems - A thorough 12 point review systems was conducted  Pertinent positives and negatives are mentioned in the history of present illness  Past Medical and Surgical History:     History reviewed  No pertinent past medical history      Past Surgical History:   Procedure Laterality Date    EXPLORATORY LAPAROTOMY      IA COLONOSCOPY FLX DX W/COLLJ SPEC WHEN PFRMD N/A 8/1/2018    Procedure: COLONOSCOPY;  Surgeon: Felicia Villegas MD;  Location: [facility]; Service: Colorectal         Medications & Allergies:    Prior to Admission medications    Medication Sig Start Date End Date Taking? Authorizing Provider   Multiple Vitamin (MULTIVITAMIN) tablet Take 1 tablet by mouth daily   Yes Historical Provider, MD         Allergies: No Known Allergies      Social History:    Substance Use History:   Social History     Substance and Sexual Activity   Alcohol Use No     Social History     Tobacco Use   Smoking Status Never Smoker   Smokeless Tobacco Never Used     Social History     Substance and Sexual Activity   Drug Use No         Family History:    Denies pertinent family history at this time        Physical Exam:     Vitals:   Blood Pressure: 118/62 (12/23/19 1209)  Pulse: (!) 110 (12/23/19 1209)  Temperature: 98 3 °F (36 8 °C) (12/23/19 1005)  Temp Source: Oral (12/23/19 1005)  Respirations: 18 (12/23/19 1209)  Weight - Scale: 49 5 kg (109 lb 2 oz) (12/23/19 1005)  SpO2: 100 % (12/23/19 1209)      GENERAL:  Well-developed/nourished - no immediate distress  HEAD:  Normocephalic - atraumatic  EYES: PERRL - EOMI - conjunctival pallor noted  MOUTH:  Mucosa moist  NECK:  Supple - full range of motion  CARDIAC:  Tachycardic but regular rhythm - S1/S2 positive  PULMONARY:  Clear breath sounds bilaterally - nonlabored respirations  ABDOMEN:  Soft - nontender/nondistended - active bowel sounds  MUSCULOSKELETAL:  Motor strength/range of motion intact  NEUROLOGIC:  Alert/oriented at baseline  SKIN:  Age-appropriate wrinkles/blemishes   PSYCHIATRIC:  Mood/affect stable      Additional Data:     Labs & Recent Cultures:    Results from last 7 days   Lab Units 12/23/19  1059   WBC Thousand/uL 5 66   HEMOGLOBIN g/dL 4 8*   HEMATOCRIT % 18 9*   PLATELETS Thousands/uL 1,268*   NEUTROS PCT % 73   LYMPHS PCT % 15   MONOS PCT % 9   EOS PCT % 1     Results from last 7 days   Lab Units 12/23/19  1059   SODIUM mmol/L 138   POTASSIUM mmol/L 3 5   CHLORIDE mmol/L 104   CO2 mmol/L 28   BUN mg/dL 12   CREATININE mg/dL 0 71   ANION GAP mmol/L 6   CALCIUM mg/dL 8 9   ALBUMIN g/dL 3 5   TOTAL BILIRUBIN mg/dL 1 33*   ALK PHOS U/L 72   ALT U/L 19   AST U/L 17   GLUCOSE RANDOM mg/dL 100             ** Please Note: This note is constructed using a voice recognition dictation system  An occasional wrong word/phrase or sound-a-like substitution may have been picked up by dictation device due to the inherent limitations of voice recognition software  Read the chart carefully and recognize, using reasonable context, where substitutions may have occurred  **

## 2019-12-23 NOTE — ASSESSMENT & PLAN NOTE
- possibly reactive due to profound anemia vs alternate underlying hematologic etiology  - await hematology input with peripheral smear analysis - consider bone marrow biopsy (defer to hematology)  - monitor platelet count - was normal when last checked in August 2018

## 2019-12-23 NOTE — ED PROVIDER NOTES
History  Chief Complaint   Patient presents with    Syncope     patient with syncopal episode at home  deneis any pain     Patient had a syncopal event at home this morning  Her family member heard her fall  She states that she ate this morning and then got up and was walking, felt dizzy and slid down to the ground  She did not hit her head  No headaches  LOC for a few seconds  No chest pain, no shortness of breath  No risk factors for PE or CAD  She denies any injuries  LMP last week          Prior to Admission Medications   Prescriptions Last Dose Informant Patient Reported? Taking? Multiple Vitamin (MULTIVITAMIN) tablet   Yes Yes   Sig: Take 1 tablet by mouth daily      Facility-Administered Medications: None       History reviewed  No pertinent past medical history  Past Surgical History:   Procedure Laterality Date    EXPLORATORY LAPAROTOMY      TN COLONOSCOPY FLX DX W/COLLJ SPEC WHEN PFRMD N/A 8/1/2018    Procedure: COLONOSCOPY;  Surgeon: Kasia Toribio MD;  Location: [facility]; Service: Colorectal       History reviewed  No pertinent family history  I have reviewed and agree with the history as documented  Social History     Tobacco Use    Smoking status: Never Smoker    Smokeless tobacco: Never Used   Substance Use Topics    Alcohol use: No    Drug use: No        Review of Systems   Constitutional: Negative for appetite change, fatigue and fever  HENT: Negative for rhinorrhea and sore throat  Respiratory: Negative for cough, shortness of breath and wheezing  Cardiovascular: Negative for chest pain and leg swelling  Gastrointestinal: Negative for abdominal pain, diarrhea and vomiting  Genitourinary: Negative for dysuria and flank pain  Musculoskeletal: Negative for back pain and neck pain  Skin: Negative for rash  Neurological: Positive for dizziness and syncope  Negative for headaches     Psychiatric/Behavioral:        Mood normal       Physical Exam  Physical Exam   Constitutional: She is oriented to person, place, and time  She appears well-developed and well-nourished  HENT:   Head: Normocephalic and atraumatic  Mouth/Throat: Oropharynx is clear and moist    Eyes: Pupils are equal, round, and reactive to light  Neck: Normal range of motion  Neck supple  Cardiovascular: Normal rate and regular rhythm  Pulmonary/Chest: Effort normal and breath sounds normal  No respiratory distress  Abdominal: Soft  There is no tenderness  Genitourinary:   Genitourinary Comments: Rectal exam, heme-negative (control positive)   Musculoskeletal: Normal range of motion  Neurological: She is alert and oriented to person, place, and time  No cranial nerve deficit  Skin: Skin is warm and dry  Nursing note and vitals reviewed        Vital Signs  ED Triage Vitals [12/23/19 1005]   Temperature Pulse Respirations Blood Pressure SpO2   98 3 °F (36 8 °C) (!) 112 14 107/53 100 %      Temp Source Heart Rate Source Patient Position - Orthostatic VS BP Location FiO2 (%)   Oral Monitor Sitting Right arm --      Pain Score       No Pain           Vitals:    12/23/19 1533 12/23/19 1534 12/23/19 1535 12/23/19 1605   BP: 102/58 113/61 111/61 109/59   Pulse: (!) 108 (!) 123 (!) 124 101   Patient Position - Orthostatic VS: Lying - Orthostatic VS Sitting - Orthostatic VS Standing - Orthostatic VS Lying         Visual Acuity  Visual Acuity      Most Recent Value   L Pupil Size (mm)  3   R Pupil Size (mm)  3          ED Medications  Medications   multivitamin stress formula tablet 1 tablet (1 tablet Oral Given 12/23/19 1403)   sodium chloride 0 9 % infusion (100 mL/hr Intravenous New Bag 12/23/19 1403)   ondansetron (ZOFRAN) injection 4 mg (has no administration in time range)   acetaminophen (TYLENOL) tablet 650 mg (has no administration in time range)   sodium chloride 0 9 % bolus 1,000 mL (0 mL Intravenous Stopped 12/23/19 1208)       Diagnostic Studies  Results Reviewed     Procedure Component Value Units Date/Time    Lactate dehydrogenase [[id number]]  (Abnormal) Collected:  12/23/19 1059    Lab Status:  Final result Specimen:  Blood from Arm, Right Updated:  12/23/19 1341      U/L     TSH [[id number]]  (Normal) Collected:  12/23/19 1059    Lab Status:  Final result Specimen:  Blood from Arm, Right Updated:  12/23/19 1132     TSH 3RD GENERATON 2 151 uIU/mL     Narrative:       Patients undergoing fluorescein dye angiography may retain small amounts of fluorescein in the body for 48-72 hours post procedure  Samples containing fluorescein can produce falsely depressed TSH values  If the patient had this procedure,a specimen should be resubmitted post fluorescein clearance        CBC and differential [[id number]]  (Abnormal) Collected:  12/23/19 1059    Lab Status:  Final result Specimen:  Blood from Arm, Right Updated:  12/23/19 1127     WBC 5 66 Thousand/uL      RBC 2 82 Million/uL      Hemoglobin 4 8 g/dL      Hematocrit 18 9 %      MCV 67 fL      MCH 17 0 pg      MCHC 25 4 g/dL      RDW 32 7 %      MPV 8 7 fL      Platelets 4,556 Thousands/uL      nRBC 1 /100 WBCs      Neutrophils Relative 73 %      Immat GRANS % 1 %      Lymphocytes Relative 15 %      Monocytes Relative 9 %      Eosinophils Relative 1 %      Basophils Relative 1 %      Neutrophils Absolute 4 22 Thousands/µL      Immature Grans Absolute 0 03 Thousand/uL      Lymphocytes Absolute 0 84 Thousands/µL      Monocytes Absolute 0 50 Thousand/µL      Eosinophils Absolute 0 03 Thousand/µL      Basophils Absolute 0 04 Thousands/µL     Comprehensive metabolic panel [[id number]]  (Abnormal) Collected:  12/23/19 1059    Lab Status:  Final result Specimen:  Blood from Arm, Right Updated:  12/23/19 1126     Sodium 138 mmol/L      Potassium 3 5 mmol/L      Chloride 104 mmol/L      CO2 28 mmol/L      ANION GAP 6 mmol/L      BUN 12 mg/dL      Creatinine 0 71 mg/dL      Glucose 100 mg/dL      Calcium 8 9 mg/dL      AST 17 U/L      ALT 19 U/L Alkaline Phosphatase 72 U/L      Total Protein 8 0 g/dL      Albumin 3 5 g/dL      Total Bilirubin 1 33 mg/dL      eGFR 115 ml/min/1 73sq m     Narrative:       [place] guidelines for Chronic Kidney Disease (CKD):     Stage 1 with normal or high GFR (GFR > 90 mL/min/1 73 square meters)    Stage 2 Mild CKD (GFR = 60-89 mL/min/1 73 square meters)    Stage 3A Moderate CKD (GFR = 45-59 mL/min/1 73 square meters)    Stage 3B Moderate CKD (GFR = 30-44 mL/min/1 73 square meters)    Stage 4 Severe CKD (GFR = 15-29 mL/min/1 73 square meters)    Stage 5 End Stage CKD (GFR <15 mL/min/1 73 square meters)  Note: GFR calculation is accurate only with a steady state creatinine    Troponin I [[id number]]  (Normal) Collected:  12/23/19 1059    Lab Status:  Final result Specimen:  Blood from Arm, Right Updated:  12/23/19 1126     Troponin I <0 02 ng/mL     Urine Microscopic [[id number]]  (Abnormal) Collected:  12/23/19 1043    Lab Status:  Final result Specimen:  Urine, Clean Catch Updated:  12/23/19 1103     RBC, UA None Seen /hpf      WBC, UA 4-10 /hpf      Epithelial Cells Occasional /hpf      Bacteria, UA Moderate /hpf      AMORPH PHOSPATES Occasional /hpf     POCT pregnancy, urine [[id number]]  (Normal) Resulted:  12/23/19 1057    Lab Status:  Final result Updated:  12/23/19 1057     EXT PREG TEST UR (Ref: Negative) negative     Control valid    POCT urinalysis dipstick [[id number]]  (Abnormal) Resulted:  12/23/19 1046    Lab Status:  Final result Specimen:  Urine Updated:  12/23/19 1046    Urine Macroscopic, POC [[id number]]  (Abnormal) Collected:  12/23/19 1043    Lab Status:  Final result Specimen:  Urine Updated:  12/23/19 1044     Color, UA Yellow     Clarity, UA Cloudy     pH, UA 8 5     Leukocytes, UA Trace     Nitrite, UA Negative     Protein,  (2+) mg/dl      Glucose, UA Negative mg/dl      Ketones, UA Negative mg/dl      Urobilinogen, UA 0 2 E U /dl      Bilirubin, UA Negative     Blood, UA Trace     Specific [place], UA 1 020    Narrative:       CLINITEK RESULT                 No orders to display              Procedures  ECG 12 Lead Documentation Only  Date/Time: 12/23/2019 10:19 AM  Performed by: Trish Rubio MD  Authorized by: Trish Rubio MD     Rate:     ECG rate:  107    ECG rate assessment: tachycardic    Rhythm:     Rhythm: sinus tachycardia    ST segments:     ST segments:  Non-specific    CriticalCare Time  Performed by: Trish Rubio MD  Authorized by: Trish Rubio MD     Critical care provider statement:     Critical care time (minutes):  60    Critical care was necessary to treat or prevent imminent or life-threatening deterioration of the following conditions:  Circulatory failure, dehydration and shock    Critical care was time spent personally by me on the following activities:  Development of treatment plan with patient or surrogate, obtaining history from patient or surrogate, evaluation of patient's response to treatment, examination of patient, interpretation of cardiac output measurements, ordering and performing treatments and interventions, ordering and review of laboratory studies and re-evaluation of patient's condition  Comments:      When patient sat up her heart rate went to the 150s, she was transfused 3 units packed red blood cells and admitted for further workup/management  Her rate was normalizing with IV fluids and blood products             ED Course                               MDM  Number of Diagnoses or Management Options  Anemia:   Syncope: Thrombocytosis ([facility] [address] ):      Amount and/or Complexity of Data Reviewed  Clinical lab tests: ordered and reviewed    Risk of Complications, Morbidity, and/or Mortality  Presenting problems: moderate  General comments: I reviewed the patient's labs with her  Her hemoglobin is 4 8  Her hemoglobin was 10 3 on 08/01/2018    She states that at times she has blood in her stools from hemorrhoids, nothing lately  She admits to feeling tired a lot over the past month or so  She was transfused 3 units packed red blood cells and admitted to the hospitalist for further workup/management          Disposition  Final diagnoses:   Syncope   Anemia   Thrombocytosis ([address] )     Time reflects when diagnosis was documented in both MDM as applicable and the Disposition within this note     Time User Action Codes Description Comment    12/23/2019 12:20 PM Chamberlain Min R Add [R55] Syncope     12/23/2019 12:21 PM Chamberlain Min Add [D64 9] Anemia     12/23/2019 12:21 PM Tulio Longoria R Add [D47 3] Thrombocytosis [facility])       ED Disposition     ED Disposition Condition Date/Time Comment    Admit Stable Mon Dec 23, 2019 12:20 PM Case was discussed with VERÓNICA and the patient's admission status was agreed to be inpt /tele        Follow-up Information    None         Current Discharge Medication List      CONTINUE these medications which have NOT CHANGED    Details   Multiple Vitamin (MULTIVITAMIN) tablet Take 1 tablet by mouth daily           No discharge procedures on file      ED Provider  Electronically Signed by           Vahid Delgado MD  12/23/19 0532

## 2019-12-24 VITALS
SYSTOLIC BLOOD PRESSURE: 102 MMHG | BODY MASS INDEX: 19.96 KG/M2 | TEMPERATURE: 97.8 F | RESPIRATION RATE: 18 BRPM | DIASTOLIC BLOOD PRESSURE: 71 MMHG | WEIGHT: 109.13 LBS | HEART RATE: 89 BPM | OXYGEN SATURATION: 98 %

## 2019-12-24 LAB
ABO GROUP BLD BPU: NORMAL
ANION GAP SERPL CALCULATED.3IONS-SCNC: 8 MMOL/L (ref 4–13)
BASOPHILS # BLD AUTO: 0.07 THOUSANDS/ΜL (ref 0–0.1)
BASOPHILS NFR BLD AUTO: 1 % (ref 0–1)
BPU ID: NORMAL
BUN SERPL-MCNC: 8 MG/DL (ref 5–25)
CALCIUM SERPL-MCNC: 9.1 MG/DL (ref 8.3–10.1)
CHLORIDE SERPL-SCNC: 105 MMOL/L (ref 100–108)
CO2 SERPL-SCNC: 27 MMOL/L (ref 21–32)
CREAT SERPL-MCNC: 0.7 MG/DL (ref 0.6–1.3)
CROSSMATCH: NORMAL
EOSINOPHIL # BLD AUTO: 0.05 THOUSAND/ΜL (ref 0–0.61)
EOSINOPHIL NFR BLD AUTO: 1 % (ref 0–6)
ERYTHROCYTE [DISTWIDTH] IN BLOOD BY AUTOMATED COUNT: 28.2 % (ref 11.6–15.1)
FERRITIN SERPL-MCNC: 2 NG/ML (ref 8–388)
FOLATE SERPL-MCNC: >20 NG/ML (ref 3.1–17.5)
GFR SERPL CREATININE-BSD FRML MDRD: 117 ML/MIN/1.73SQ M
GLUCOSE SERPL-MCNC: 91 MG/DL (ref 65–140)
HCT VFR BLD AUTO: 32.5 % (ref 34.8–46.1)
HCT VFR BLD AUTO: 32.8 % (ref 34.8–46.1)
HCT VFR BLD AUTO: 33 % (ref 34.8–46.1)
HGB BLD-MCNC: 9.7 G/DL (ref 11.5–15.4)
HGB BLD-MCNC: 9.8 G/DL (ref 11.5–15.4)
HGB BLD-MCNC: 9.9 G/DL (ref 11.5–15.4)
IMM GRANULOCYTES # BLD AUTO: 0.02 THOUSAND/UL (ref 0–0.2)
IMM GRANULOCYTES NFR BLD AUTO: 0 % (ref 0–2)
IRON SATN MFR SERPL: 4 %
IRON SERPL-MCNC: 18 UG/DL (ref 50–170)
LYMPHOCYTES # BLD AUTO: 1.56 THOUSANDS/ΜL (ref 0.6–4.47)
LYMPHOCYTES NFR BLD AUTO: 31 % (ref 14–44)
MCH RBC QN AUTO: 22.4 PG (ref 26.8–34.3)
MCHC RBC AUTO-ENTMCNC: 29.7 G/DL (ref 31.4–37.4)
MCV RBC AUTO: 75 FL (ref 82–98)
MONOCYTES # BLD AUTO: 0.57 THOUSAND/ΜL (ref 0.17–1.22)
MONOCYTES NFR BLD AUTO: 11 % (ref 4–12)
NEUTROPHILS # BLD AUTO: 2.82 THOUSANDS/ΜL (ref 1.85–7.62)
NEUTS SEG NFR BLD AUTO: 56 % (ref 43–75)
NRBC BLD AUTO-RTO: 1 /100 WBCS
PLATELET # BLD AUTO: 857 THOUSANDS/UL (ref 149–390)
PMV BLD AUTO: 8.9 FL (ref 8.9–12.7)
POTASSIUM SERPL-SCNC: 3.5 MMOL/L (ref 3.5–5.3)
RBC # BLD AUTO: 4.38 MILLION/UL (ref 3.81–5.12)
SODIUM SERPL-SCNC: 140 MMOL/L (ref 136–145)
TIBC SERPL-MCNC: 467 UG/DL (ref 250–450)
UNIT DISPENSE STATUS: NORMAL
UNIT PRODUCT CODE: NORMAL
UNIT RH: NORMAL
VIT B12 SERPL-MCNC: 501 PG/ML (ref 100–900)
WBC # BLD AUTO: 5.09 THOUSAND/UL (ref 4.31–10.16)

## 2019-12-24 PROCEDURE — 99239 HOSP IP/OBS DSCHRG MGMT >30: CPT | Performed by: INTERNAL MEDICINE

## 2019-12-24 PROCEDURE — 85025 COMPLETE CBC W/AUTO DIFF WBC: CPT | Performed by: INTERNAL MEDICINE

## 2019-12-24 PROCEDURE — 83540 ASSAY OF IRON: CPT | Performed by: INTERNAL MEDICINE

## 2019-12-24 PROCEDURE — 82607 VITAMIN B-12: CPT | Performed by: INTERNAL MEDICINE

## 2019-12-24 PROCEDURE — 80048 BASIC METABOLIC PNL TOTAL CA: CPT | Performed by: INTERNAL MEDICINE

## 2019-12-24 PROCEDURE — 82728 ASSAY OF FERRITIN: CPT | Performed by: INTERNAL MEDICINE

## 2019-12-24 PROCEDURE — 83918 ORGANIC ACIDS TOTAL QUANT: CPT | Performed by: INTERNAL MEDICINE

## 2019-12-24 PROCEDURE — P9016 RBC LEUKOCYTES REDUCED: HCPCS

## 2019-12-24 PROCEDURE — 85014 HEMATOCRIT: CPT | Performed by: INTERNAL MEDICINE

## 2019-12-24 PROCEDURE — 83550 IRON BINDING TEST: CPT | Performed by: INTERNAL MEDICINE

## 2019-12-24 PROCEDURE — 99255 IP/OBS CONSLTJ NEW/EST HI 80: CPT | Performed by: INTERNAL MEDICINE

## 2019-12-24 PROCEDURE — 82746 ASSAY OF FOLIC ACID SERUM: CPT | Performed by: INTERNAL MEDICINE

## 2019-12-24 PROCEDURE — 85018 HEMOGLOBIN: CPT | Performed by: INTERNAL MEDICINE

## 2019-12-24 RX ORDER — DOXYCYCLINE HYCLATE 50 MG/1
324 CAPSULE, GELATIN COATED ORAL
Refills: 0
Start: 2019-12-24 | End: 2022-03-08

## 2019-12-24 RX ORDER — DOXYCYCLINE HYCLATE 50 MG/1
324 CAPSULE, GELATIN COATED ORAL
Status: DISCONTINUED | OUTPATIENT
Start: 2019-12-24 | End: 2019-12-24 | Stop reason: HOSPADM

## 2019-12-24 RX ADMIN — ZINC 1 TABLET: TAB ORAL at 08:46

## 2019-12-24 RX ADMIN — ACETAMINOPHEN 650 MG: 325 TABLET ORAL at 11:28

## 2019-12-24 RX ADMIN — SODIUM CHLORIDE 100 ML/HR: 0.9 INJECTION, SOLUTION INTRAVENOUS at 11:55

## 2019-12-24 NOTE — DISCHARGE SUMMARY
Discharge Summary - Medical Aurora Evangelista 33 y o  female MRN: [id number]    [address] Room / Bed: [place] [address] [address] Encounter: [id number]    BRIEF OVERVIEW    Admitting Provider: Stacy Pardo DO  Discharge Provider: Stacy Pardo DO  Admission Date: 12/23/2019     Discharge Date: No discharge date for patient encounter  Primary Care Physician at Discharge: No primary care provider on file  None    Primary Discharge Diagnosis  Symptomatic anemia  Iron deficiency    Other Problems Addressed  Patient Active Problem List    Diagnosis Date Noted    Symptomatic anemia 12/23/2019    Thrombocytosis 12/23/2019    History of hemorrhoids 12/23/2019       Consulting Providers   Gastroenterology    Discharge Disposition: home    Allergies  No Known Allergies  Diet restrictions:  None  Activity restrictions:  None  Discharge Condition:  [address] in 1 week  Follow up with consulting providers  Gastroenterology Dr Maryjane Best in 2 weeks     [address] [place] [address]    Presenting Problem/History of Present Illness  Symptomatic anemia  Hospital Course  67-year-old female presents with shortness of breath and syncope  Workup revealed hemoglobin of 4 8    Symptomatic anemia  patient with a history of hemorrhoidal bleeding and reports some rectal bleeding but no other active bleeding  Did recall she may have iron deficiency  She was seen by Gastroenterology and 4 units of packed red blood cells were infused  Hemoglobin prior to discharge 9 7  Gastroenterology recommends discharge home and follow up in 2 weeks for further diagnostic testing  Iron deficiency   iron level 18, recommend p o  Supplementation and continued CBC and iron level monitoring  Discharge  Statement   I spent 35 minutes discharging the patient  This time was spent on the day of discharge   I had direct contact with the patient on the day of discharge  Additional documentation is required if more than 30 minutes were spent on discharge  Discharge discussed with Gastroenterology    Discharge instructions/Information to patient and family:   See after visit summary for information provided to patient and family

## 2019-12-24 NOTE — UTILIZATION REVIEW
Initial Clinical Review    Admission: Date/Time/Statement: Inpatient Admission Orders (From admission, onward)     Ordered        12/23/19 1222  Inpatient Admission (expected length of stay for this patient Order details is greater than two midnights)  Once                   Orders Placed This Encounter   Procedures    Inpatient Admission (expected length of stay for this patient Order details is greater than two midnights)     Standing Status:   Standing     Number of Occurrences:   1     Order Specific Question:   Admitting Physician     Answer:   Carlos Stapleton     Order Specific Question:   Level of Care     Answer:   Med Surg [16]     Order Specific Question:   Estimated length of stay     Answer:   More than 2 Midnights     Order Specific Question:   Certification     Answer:   I certify that inpatient services are medically necessary for this patient for a duration of greater than two midnights  See H&P and MD Progress Notes for additional information about the patient's course of treatment  ED Arrival Information     Expected Arrival Acuity Means of Arrival Escorted By Service Admission Type    - 12/23/2019 09:57 Urgent [address] [facility] Urgent    Arrival Complaint    syncope        Chief Complaint   Patient presents with    Syncope     patient with syncopal episode at home  deneis any pain     Assessment/Plan:    33  Y O female  Presents to  ED with  Increasing  Shortness of  Breath,  Coupled with a  Syncopal episode the morning of admission, without head trauma  In ED, found with a  Hemoglobin  Of  4 8 and  Transfusion ordered  Stated she had  Episodes of blood in her stool  Over the past week  Has a history  Of  Internal and external hemorrhoids, had a  Colonoscopy  Approximately  1 yr ago and surgery recommended, but patient refused  Vaguely  remembers  Being told  Her  Iron  Was low  Feels  Generally  Weak and fatigued    Shortness of  Breath is improved  With rest  Admit  IP with Symptomatic  Anemia and  Thrombocytosis  And   Plan is  Monitor labs, hematology consult, cl liq  Diet, PRBC, IVF  And  Possibly  GI consult  Per  Hematology  Consult:    Patient  Does have  Microcytic hypochromic anemia, possibly  Related to hemorrhoids  Does  Have thrombocytosis, may be reactive  To bleeding and iron deficiency  Needs  Additional  Labs for definitive  Diagnosis          ED Triage Vitals [12/23/19 1005]   Temperature Pulse Respirations Blood Pressure SpO2   98 3 °F (36 8 °C) (!) 112 14 107/53 100 %      Temp Source Heart Rate Source Patient Position - Orthostatic VS BP Location FiO2 (%)   Oral Monitor Sitting Right arm --      Pain Score       No Pain        Wt Readings from Last 1 Encounters:   12/23/19 49 5 kg (109 lb 2 oz)     Additional Vital Signs:   23/19 1843  99 5 °F (37 5 °C)  94  18  105/54  100 %  None (Room air)     12/23/19 1805  99 1 °F (37 3 °C)  100  18  107/58  100 %  None (Room air)     12/23/19 1605  99 5 °F (37 5 °C)  101  18  109/59  100 %  None (Room air)  Lying   12/23/19 1542      18    100 %  None (Room air)     12/23/19 1535    124Abnormal     111/61      Standing - Orthostatic VS   12/23/19 1534    123Abnormal     113/61      Sitting - Orthostatic VS   12/23/19 1533  99 4 °F (37 4 °C)  108Abnormal     102/58      Lying - Orthostatic VS   12/23/19 1505  99 °F (37 2 °C)  110Abnormal   16  111/64  100 %  None (Room air)  Lying   12/23/19 1315  99 3 °F (37 4 °C)  110Abnormal   18  113/56  100 %  None (Room air)  Lying   12/23/19 1209    110Abnormal   18  118/62  100 %  None (Room air)     12/23/19 1005  98 3 °F (36 8 °C)  112Abnormal   14  107/53  100 %    Sitting         Pertinent Labs/Diagnostic Test Results:   Results from last 7 days   Lab Units 12/24/19  0550 12/23/19  1954 12/23/19  1059   WBC Thousand/uL 5 09  --  5 66   HEMOGLOBIN g/dL 9 8*  9 9* 6 4* 4 8*   HEMATOCRIT % 33 0*  32 8* 23 1* 18 9*   PLATELETS Thousands/uL 857* --  1,268*   NEUTROS ABS Thousands/µL 2 82  --  4 22         Results from last 7 days   Lab Units 12/24/19  0550 12/23/19  1059   SODIUM mmol/L 140 138   POTASSIUM mmol/L 3 5 3 5   CHLORIDE mmol/L 105 104   CO2 mmol/L 27 28   ANION GAP mmol/L 8 6   BUN mg/dL 8 12   CREATININE mg/dL 0 70 0 71   EGFR ml/min/1 73sq m 117 115   CALCIUM mg/dL 9 1 8 9     Results from last 7 days   Lab Units 12/23/19  1059   AST U/L 17   ALT U/L 19   ALK PHOS U/L 72   TOTAL PROTEIN g/dL 8 0   ALBUMIN g/dL 3 5   TOTAL BILIRUBIN mg/dL 1 33*         Results from last 7 days   Lab Units 12/24/19  0550 12/23/19  1059   GLUCOSE RANDOM mg/dL 91 100           Results from last 7 days   Lab Units 12/23/19  1655 12/23/19  1422 12/23/19  1059   TROPONIN I ng/mL <0 02 <0 02 <0 02             Results from last 7 days   Lab Units 12/23/19  1059   TSH 3RD GENERATON uIU/mL 2 151         Results from last 7 days   Lab Units 12/23/19  1043   CLARITY UA  Cloudy   COLOR UA  Yellow   SPEC GRAV UA  1 020   PH UA  8 5*   GLUCOSE UA mg/dl Negative   KETONES UA mg/dl Negative   BLOOD UA  Trace*   PROTEIN UA mg/dl 100 (2+)*   NITRITE UA  Negative   BILIRUBIN UA  Negative   UROBILINOGEN UA E U /dl 0 2   LEUKOCYTES UA  Trace*   WBC UA /hpf 4-10*   RBC UA /hpf None Seen   BACTERIA UA /hpf Moderate*   EPITHELIAL CELLS WET PREP /hpf Occasional         ED Treatment:   Medication Administration from 12/23/2019 0957 to 12/23/2019 1301       Date/Time Order Dose Route Action Comments     12/23/2019 1208 sodium chloride 0 9 % bolus 1,000 mL 0 mL Intravenous Stopped      12/23/2019 1059 sodium chloride 0 9 % bolus 1,000 mL 1,000 mL Intravenous New Bag           Admitting Diagnosis: Syncope [R55]  Anemia [D64 9]  Thrombocytosis ([address] ) [D47 3]  Age/Sex: 33 y o  female  Admission Orders:  Scheduled Medications:    Medications:  multivitamin stress formula 1 tablet Oral Daily     Continuous IV Infusions:    sodium chloride 100 mL/hr Intravenous Continuous     PRN Meds:    acetaminophen 650 mg Oral Q6H PRN   ondansetron 4 mg Intravenous Q4H PRN       IP CONSULT TO HEMATOLOGY  IP CONSULT TO GASTROENTEROLOGY     H/H  Q 8 hrs  Tele  3  U  PRBC    Network Utilization Review Department  [email or web address]  ATTENTION: Please call with any questions or concerns to [phone] and carefully listen to the prompts so that you are directed to the right person  All voicemails are confidential   Mariana Apple all requests for admission clinical reviews, approved or denied determinations and any other requests to dedicated fax number below belonging to the campus where the patient is receiving treatment   List of dedicated fax numbers for the Facilities:  [address] DENIALS (Administrative/Medical Necessity) [phone]   [address] (Maternity/NICU/Pediatrics) [phone]   Valentina Headley [phone]   Rohith Pereira [phone]   Yobani Butler [phone]   Barbara Vázquez [phone]   [facility] [address] [phone]   [facility]  [phone]   [address] [place] [address], [address]  [address] [facility] [address] [facility] [phone]

## 2019-12-24 NOTE — CONSULTS
Consultation - [facility]  Manjit Rosalinda 33 y o  female MRN: [id number]  Unit/Bed#: E5 -01 Encounter: [id number]        Inpatient consult to gastroenterology  Consult performed by: Debbie Guerin PA-C  Consult ordered by: Brittani Baer MD          Reason for Consult / Principal Problem: anemia    HPI:  69-year-old female with remote history of colon resection during infancy, rectal bleeding, and internal and external hemorrhoids presenting for evaluation of anemia  She admits to progressive shortness of breath on exertion over the past few months  Yesterday she felt dizzy and lightheaded and suffered a syncopal episode at home  Hemoglobin was 4 8 on presentation  MCV low 67  She was given 4 units PRBCs and hemoglobin responded appropriately to 9 8  She is feeling much better today  She does admit to intermittent rectal bleeding going back to 2018  She underwent colonoscopy with Dr Cayla Bell which showed normal, widely patent ileocolic anastomosis and internal and external hemorrhoids  She was recommended to undergo surgery, however did not follow through  She states the rectal bleeding occurs sporadically and is dependent on diet  She does have occasional constipation and straining  Her stools range from type 1 to type 4 on the [facility] stool chart  She denies abdominal pain, nausea, vomiting, abnormal weight loss  She denies menorrhagia  REVIEW OF SYSTEMS:    CONSTITUTIONAL: Denies any fever, chills  Good appetite, and no recent weight loss  HEENT: No earache or tinnitus  Denies hearing loss or visual disturbances  CARDIOVASCULAR: No chest pain or palpitations  RESPIRATORY: Denies any cough, hemoptysis  + Dyspnea on exertion  GASTROINTESTINAL: As noted in the History of Present Illness  GENITOURINARY: No problems with urination  Denies any hematuria or dysuria  NEUROLOGIC: No dizziness or vertigo, denies headaches  MUSCULOSKELETAL: Denies any muscle or joint pain     SKIN: Denies skin rashes or itching  ENDOCRINE: Denies excessive thirst  Denies intolerance to heat or cold  PSYCHOSOCIAL: Denies depression or anxiety  Denies any recent memory loss  Historical Information   History reviewed  No pertinent past medical history  Past Surgical History:   Procedure Laterality Date    EXPLORATORY LAPAROTOMY      SD COLONOSCOPY FLX DX W/COLLJ SPEC WHEN PFRMD N/A 8/1/2018    Procedure: COLONOSCOPY;  Surgeon: Luis Stuart MD;  Location: [facility]; Service: Colorectal     Social History   Social History     Substance and Sexual Activity   Alcohol Use No     Social History     Substance and Sexual Activity   Drug Use No     Social History     Tobacco Use   Smoking Status Never Smoker   Smokeless Tobacco Never Used     History reviewed  No pertinent family history  Meds/Allergies     Medications Prior to Admission   Medication    Multiple Vitamin (MULTIVITAMIN) tablet     Current Facility-Administered Medications   Medication Dose Route Frequency    acetaminophen (TYLENOL) tablet 650 mg  650 mg Oral Q6H PRN    multivitamin stress formula tablet 1 tablet  1 tablet Oral Daily    ondansetron (ZOFRAN) injection 4 mg  4 mg Intravenous Q4H PRN    sodium chloride 0 9 % infusion  100 mL/hr Intravenous Continuous       No Known Allergies        Objective     Blood pressure 102/71, pulse 89, temperature 97 8 °F (36 6 °C), temperature source Temporal, resp  rate 18, weight 49 5 kg (109 lb 2 oz), last menstrual period 12/21/2019, SpO2 98 %        Intake/Output Summary (Last 24 hours) at 12/24/2019 1141  Last data filed at 12/24/2019 0414  Gross per 24 hour   Intake 2400 ml   Output    Net 2400 ml         PHYSICAL EXAM:      General Appearance:   Alert, cooperative, no distress, appears stated age    HEENT:   Normocephalic, atraumatic, anicteric      Neck:  Supple, symmetrical, trachea midline, no adenopathy   Lungs:   Clear to auscultation bilaterally   Heart[de-identified]   S1 and S2 normal; regular rate and rhythm   Abdomen:   Soft, non-tender, non-distended; normal bowel sounds   Genitalia:   Deferred    Rectal:   Deferred    Extremities:  No cyanosis, clubbing or edema    Pulses:  2+ and symmetric all extremities    Skin:  Skin color, texture, turgor normal, no rashes or lesions    Lymph nodes:  No palpable cervical lymphadenopathy        Lab Results:   Results from last 7 days   Lab Units 12/24/19  0550   WBC Thousand/uL 5 09   HEMOGLOBIN g/dL 9 8*  9 9*   HEMATOCRIT % 33 0*  32 8*   PLATELETS Thousands/uL 857*   NEUTROS PCT % 56   LYMPHS PCT % 31   MONOS PCT % 11   EOS PCT % 1     Results from last 7 days   Lab Units 12/24/19  0550 12/23/19  1059   POTASSIUM mmol/L 3 5 3 5   CHLORIDE mmol/L 105 104   CO2 mmol/L 27 28   BUN mg/dL 8 12   CREATININE mg/dL 0 70 0 71   CALCIUM mg/dL 9 1 8 9   ALK PHOS U/L  --  72   ALT U/L  --  19   AST U/L  --  17               Imaging Studies: I have personally reviewed pertinent imaging studies  No results found  ASSESSMENT and PLAN:      76-year-old female with remote history of colon resection during infancy, rectal bleeding, and internal and external hemorrhoids presenting for evaluation of anemia  1) Iron deficiency anemia: She presented with hemoglobin 4 8, down from baseline of 10 3 from August 2018  MCV low 67 - iron studies severely low ferritin 2, iron sat 4%, TIBC 467, serum iron 18  She received 3 units PRBCs and hemoglobin responded appropriately to 9 8  She denies menorrhagia  She does report intermittent rectal bleeding and does have history of hemorrhoids per colonoscopy last year  It is more unusual for hemorrhoids alone to cause iron deficiency anemia   Need to consider other GI sources of anemia and blood loss such as AVM, IBD, ulcer, celiac disease, malignancy     -Would recommend EGD and colonoscopy with TI intubation - this could be done closely as outpatient  -If these are negative, she may need capsule endoscopy to rule out bleeding from small bowel  -Would recommend IV iron infusions and oral iron supplementation  -Appreciate input from Hematology - also ruling out potential hemolytic anemia  -Monitor hemoglobin    2) Rectal bleeding in setting of known hemorrhoids:  She had evidence of internal and external hemorrhoids on colonoscopy last year     -We discussed following up with Dr Ramona Wharton to discuss hemorrhoidectomy    Patient was seen and examined by Dr Victorino Hanna  All [facility] medical decisions were made by Dr Victorino Hanna  Thank you for allowing us to participate in the care of this present patient  We will follow-up with you closely

## 2019-12-24 NOTE — PLAN OF CARE
Problem: Potential for Falls  Goal: Patient will remain free of falls  Description  INTERVENTIONS:  - Assess patient frequently for physical needs  -  Identify cognitive and physical deficits and behaviors that affect risk of falls    -  Herron fall precautions as indicated by assessment   - Educate patient/family on patient safety including physical limitations  - Instruct patient to call for assistance with activity based on assessment  - Modify environment to reduce risk of injury  - Consider OT/PT consult to assist with strengthening/mobility  Outcome: Progressing     Problem: PAIN - ADULT  Goal: Verbalizes/displays adequate comfort level or baseline comfort level  Description  Interventions:  - Encourage patient to monitor pain and request assistance  - Assess pain using appropriate pain scale  - Administer analgesics based on type and severity of pain and evaluate response  - Implement non-pharmacological measures as appropriate and evaluate response  - Consider cultural and social influences on pain and pain management  - Notify physician/advanced practitioner if interventions unsuccessful or patient reports new pain  Outcome: Progressing     Problem: INFECTION - ADULT  Goal: Absence or prevention of progression during hospitalization  Description  INTERVENTIONS:  - Assess and monitor for signs and symptoms of infection  - Monitor lab/diagnostic results  - Monitor all insertion sites, i e  indwelling lines, tubes, and drains  - Monitor endotracheal if appropriate and nasal secretions for changes in amount and color  - Herron appropriate cooling/warming therapies per order  - Administer medications as ordered  - Instruct and encourage patient and family to use good hand hygiene technique  - Identify and instruct in appropriate isolation precautions for identified infection/condition  Outcome: Progressing  Goal: Absence of fever/infection during neutropenic period  Description  INTERVENTIONS:  - Monitor WBC    Outcome: Progressing     Problem: SAFETY ADULT  Goal: Maintain or return to baseline ADL function  Description  INTERVENTIONS:  -  Assess patient's ability to carry out ADLs; assess patient's baseline for ADL function and identify physical deficits which impact ability to perform ADLs (bathing, care of mouth/teeth, toileting, grooming, dressing, etc )  - Assess/evaluate cause of self-care deficits   - Assess range of motion  - Assess patient's mobility; develop plan if impaired  - Assess patient's need for assistive devices and provide as appropriate  - Encourage maximum independence but intervene and supervise when necessary  - Involve family in performance of ADLs  - Assess for home care needs following discharge   - Consider OT consult to assist with ADL evaluation and planning for discharge  - Provide patient education as appropriate  Outcome: Progressing  Goal: Maintain or return mobility status to optimal level  Description  INTERVENTIONS:  - Assess patient's baseline mobility status (ambulation, transfers, stairs, etc )    - Identify cognitive and physical deficits and behaviors that affect mobility  - Identify mobility aids required to assist with transfers and/or ambulation (gait belt, sit-to-stand, lift, walker, cane, etc )  - Mcclellan fall precautions as indicated by assessment  - Record patient progress and toleration of activity level on Mobility SBAR; progress patient to next Phase/Stage  - Instruct patient to call for assistance with activity based on assessment  - Consider rehabilitation consult to assist with strengthening/weightbearing, etc   Outcome: Progressing     Problem: DISCHARGE PLANNING  Goal: Discharge to home or other facility with appropriate resources  Description  INTERVENTIONS:  - Identify barriers to discharge w/patient and caregiver  - Arrange for needed discharge resources and transportation as appropriate  - Identify discharge learning needs (meds, wound care, etc )  - Arrange for interpretive services to assist at discharge as needed  - Refer to Case Management Department for coordinating discharge planning if the patient needs post-hospital services based on physician/advanced practitioner order or complex needs related to functional status, cognitive ability, or social support system  Outcome: Progressing     Problem: Knowledge Deficit  Goal: Patient/family/caregiver demonstrates understanding of disease process, treatment plan, medications, and discharge instructions  Description  Complete learning assessment and assess knowledge base    Interventions:  - Provide teaching at level of understanding  - Provide teaching via preferred learning methods  Outcome: Progressing     Problem: CARDIOVASCULAR - ADULT  Goal: Maintains optimal cardiac output and hemodynamic stability  Description  INTERVENTIONS:  - Monitor I/O, vital signs and rhythm  - Monitor for S/S and trends of decreased cardiac output  - Administer and titrate ordered vasoactive medications to optimize hemodynamic stability  - Assess quality of pulses, skin color and temperature  - Assess for signs of decreased coronary artery perfusion  - Instruct patient to report change in severity of symptoms  Outcome: Progressing  Goal: Absence of cardiac dysrhythmias or at baseline rhythm  Description  INTERVENTIONS:  - Continuous cardiac monitoring, vital signs, obtain 12 lead EKG if ordered  - Administer antiarrhythmic and heart rate control medications as ordered  - Monitor electrolytes and administer replacement therapy as ordered  Outcome: Progressing     Problem: HEMATOLOGIC - ADULT  Goal: Maintains hematologic stability  Description  INTERVENTIONS  - Assess for signs and symptoms of bleeding or hemorrhage  - Monitor labs  - Administer supportive blood products/factors as ordered and appropriate  Outcome: Progressing

## 2019-12-24 NOTE — PROGRESS NOTES
Progress Note - Elda Briones 33 y o  female MRN: [id number]    Unit/Bed#: E5 -01 Encounter: [id number]    Assessment/Plan:    Symptomatic anemia   status post 4 units hemoglobin now stable discussed with GI discharge home with GI follow-up hemoglobin 9 7 improved from 4 8    Iron deficiency    start p o  Iron and monitor hemoglobin and iron level    Rectal bleeding   reported intermittent, discussed with GI follow-up outpatient    Subjective:   Feels fine, denies chest pain shortness of breath nausea vomiting diarrhea no fevers chills appetite good no further rectal bleeding      Objective:     Vitals: Blood pressure 102/71, pulse 89, temperature 97 8 °F (36 6 °C), temperature source Temporal, resp  rate 18, weight 49 5 kg (109 lb 2 oz), last menstrual period 12/21/2019, SpO2 98 %  ,Body mass index is 19 96 kg/m²  Results from last 7 days   Lab Units 12/24/19  1444 12/24/19  0550   WBC Thousand/uL  --  5 09   HEMOGLOBIN g/dL 9 7* 9 8*  9 9*   HEMATOCRIT % 32 5* 33 0*  32 8*   PLATELETS Thousands/uL  --  857*     Results from last 7 days   Lab Units 12/24/19  0550 12/23/19  1059   POTASSIUM mmol/L 3 5 3 5   CHLORIDE mmol/L 105 104   CO2 mmol/L 27 28   BUN mg/dL 8 12   CREATININE mg/dL 0 70 0 71   CALCIUM mg/dL 9 1 8 9   ALK PHOS U/L  --  72   ALT U/L  --  19   AST U/L  --  17       Scheduled Meds:      Continuous Infusions:    No current facility-administered medications for this encounter  Physical exam:  General appearance:  Alert no distress interaction appropriate  Head/Eyes:  Nonicteric PERRL EOM I  Neck:  Supple  Lungs: CTA bilateral no wheezing rhonchi or rales  Heart: normal S1 S2 regular  Abdomen: Soft nontender with bowel sounds  Extremities: no edema  Skin: no rash    Invasive Devices     Peripheral Intravenous Line            Peripheral IV 12/23/19 Right Antecubital 1 day                  Counseling / Coordination of Care  Total floor / unit time spent today 30  minutes    Greater than 50% of total time was spent with the patient and / or family counseling and / or coordination of care    A description of the counseling / coordination of care:  Discussed with GI

## 2019-12-25 LAB — HAPTOGLOB SERPL-MCNC: 142 MG/DL (ref 33–278)

## 2019-12-26 ENCOUNTER — TELEPHONE (OUTPATIENT)
Dept: GASTROENTEROLOGY | Facility: MEDICAL CENTER | Age: 29
End: 2019-12-26

## 2019-12-26 ENCOUNTER — PREP FOR PROCEDURE (OUTPATIENT)
Dept: GASTROENTEROLOGY | Facility: MEDICAL CENTER | Age: 29
End: 2019-12-26

## 2019-12-26 DIAGNOSIS — D64.9 ANEMIA, UNSPECIFIED TYPE: Primary | ICD-10-CM

## 2019-12-26 NOTE — TELEPHONE ENCOUNTER
Pt is scheduled for an egd/colon with dr Dora Reese at [address] [place] [address] on 2/11/20, I went over miralax with pt and mailed out to pt home    Patient is aware she will need a  to and from   She will get a call the day before with an exact time for arrival       Scheduled per Shailesh Madrigal 177 consult note

## 2019-12-29 LAB
METHYLMALONATE SERPL-SCNC: 88 NMOL/L (ref 0–378)
SL AMB DISCLAIMER: NORMAL

## 2020-01-27 ENCOUNTER — HOSPITAL ENCOUNTER (EMERGENCY)
Facility: HOSPITAL | Age: 30
Discharge: HOME | End: 2020-01-27
Attending: EMERGENCY MEDICINE
Payer: COMMERCIAL

## 2020-01-27 VITALS
WEIGHT: 107.81 LBS | TEMPERATURE: 98.7 F | RESPIRATION RATE: 18 BRPM | BODY MASS INDEX: 19.72 KG/M2 | OXYGEN SATURATION: 98 % | DIASTOLIC BLOOD PRESSURE: 80 MMHG | HEART RATE: 116 BPM | SYSTOLIC BLOOD PRESSURE: 128 MMHG

## 2020-01-27 DIAGNOSIS — R00.2 PALPITATIONS: Primary | ICD-10-CM

## 2020-01-27 DIAGNOSIS — R00.0 SINUS TACHYCARDIA: ICD-10-CM

## 2020-01-27 LAB
ANION GAP SERPL CALCULATED.3IONS-SCNC: 8 MMOL/L (ref 4–13)
ATRIAL RATE: 105 BPM
ATRIAL RATE: 109 BPM
BACTERIA UR QL AUTO: ABNORMAL /HPF
BASOPHILS # BLD AUTO: 0.05 THOUSANDS/ΜL (ref 0–0.1)
BASOPHILS NFR BLD AUTO: 1 % (ref 0–1)
BILIRUB UR QL STRIP: NEGATIVE
BUN SERPL-MCNC: 14 MG/DL (ref 5–25)
CALCIUM SERPL-MCNC: 9.6 MG/DL (ref 8.3–10.1)
CHLORIDE SERPL-SCNC: 107 MMOL/L (ref 100–108)
CLARITY UR: CLEAR
CO2 SERPL-SCNC: 30 MMOL/L (ref 21–32)
COLOR UR: YELLOW
CREAT SERPL-MCNC: 0.84 MG/DL (ref 0.6–1.3)
EOSINOPHIL # BLD AUTO: 0.05 THOUSAND/ΜL (ref 0–0.61)
EOSINOPHIL NFR BLD AUTO: 1 % (ref 0–6)
ERYTHROCYTE [DISTWIDTH] IN BLOOD BY AUTOMATED COUNT: 23.4 % (ref 11.6–15.1)
EXT PREG TEST URINE: NEGATIVE
EXT. CONTROL ED NAV: NORMAL
GFR SERPL CREATININE-BSD FRML MDRD: 94 ML/MIN/1.73SQ M
GLUCOSE SERPL-MCNC: 91 MG/DL (ref 65–140)
GLUCOSE UR STRIP-MCNC: NEGATIVE MG/DL
HCT VFR BLD AUTO: 40.4 % (ref 34.8–46.1)
HGB BLD-MCNC: 12.5 G/DL (ref 11.5–15.4)
HGB UR QL STRIP.AUTO: NEGATIVE
IMM GRANULOCYTES # BLD AUTO: 0.01 THOUSAND/UL (ref 0–0.2)
IMM GRANULOCYTES NFR BLD AUTO: 0 % (ref 0–2)
KETONES UR STRIP-MCNC: NEGATIVE MG/DL
LEUKOCYTE ESTERASE UR QL STRIP: ABNORMAL
LYMPHOCYTES # BLD AUTO: 0.96 THOUSANDS/ΜL (ref 0.6–4.47)
LYMPHOCYTES NFR BLD AUTO: 22 % (ref 14–44)
MCH RBC QN AUTO: 25.3 PG (ref 26.8–34.3)
MCHC RBC AUTO-ENTMCNC: 30.9 G/DL (ref 31.4–37.4)
MCV RBC AUTO: 82 FL (ref 82–98)
MONOCYTES # BLD AUTO: 0.51 THOUSAND/ΜL (ref 0.17–1.22)
MONOCYTES NFR BLD AUTO: 11 % (ref 4–12)
NEUTROPHILS # BLD AUTO: 2.88 THOUSANDS/ΜL (ref 1.85–7.62)
NEUTS SEG NFR BLD AUTO: 65 % (ref 43–75)
NITRITE UR QL STRIP: NEGATIVE
NON-SQ EPI CELLS URNS QL MICRO: ABNORMAL /HPF
NRBC BLD AUTO-RTO: 0 /100 WBCS
P AXIS: 63 DEGREES
P AXIS: 65 DEGREES
PH UR STRIP.AUTO: 7 [PH] (ref 4.5–8)
PLATELET # BLD AUTO: 392 THOUSANDS/UL (ref 149–390)
PMV BLD AUTO: 9.6 FL (ref 8.9–12.7)
POTASSIUM SERPL-SCNC: 3.3 MMOL/L (ref 3.5–5.3)
PR INTERVAL: 126 MS
PR INTERVAL: 136 MS
PROT UR STRIP-MCNC: NEGATIVE MG/DL
QRS AXIS: 76 DEGREES
QRS AXIS: 81 DEGREES
QRSD INTERVAL: 82 MS
QRSD INTERVAL: 82 MS
QT INTERVAL: 326 MS
QT INTERVAL: 342 MS
QTC INTERVAL: 439 MS
QTC INTERVAL: 452 MS
RBC # BLD AUTO: 4.95 MILLION/UL (ref 3.81–5.12)
RBC #/AREA URNS AUTO: ABNORMAL /HPF
SODIUM SERPL-SCNC: 145 MMOL/L (ref 136–145)
SP GR UR STRIP.AUTO: 1.01 (ref 1–1.03)
T WAVE AXIS: 21 DEGREES
T WAVE AXIS: 39 DEGREES
UROBILINOGEN UR QL STRIP.AUTO: 0.2 E.U./DL
VENTRICULAR RATE: 105 BPM
VENTRICULAR RATE: 109 BPM
WBC # BLD AUTO: 4.46 THOUSAND/UL (ref 4.31–10.16)
WBC #/AREA URNS AUTO: ABNORMAL /HPF

## 2020-01-27 PROCEDURE — 96360 HYDRATION IV INFUSION INIT: CPT

## 2020-01-27 PROCEDURE — 85025 COMPLETE CBC W/AUTO DIFF WBC: CPT | Performed by: EMERGENCY MEDICINE

## 2020-01-27 PROCEDURE — 81001 URINALYSIS AUTO W/SCOPE: CPT

## 2020-01-27 PROCEDURE — 80048 BASIC METABOLIC PNL TOTAL CA: CPT | Performed by: EMERGENCY MEDICINE

## 2020-01-27 PROCEDURE — 81025 URINE PREGNANCY TEST: CPT | Performed by: EMERGENCY MEDICINE

## 2020-01-27 PROCEDURE — 99285 EMERGENCY DEPT VISIT HI MDM: CPT

## 2020-01-27 PROCEDURE — 36415 COLL VENOUS BLD VENIPUNCTURE: CPT | Performed by: EMERGENCY MEDICINE

## 2020-01-27 PROCEDURE — 99284 EMERGENCY DEPT VISIT MOD MDM: CPT | Performed by: EMERGENCY MEDICINE

## 2020-01-27 PROCEDURE — 93010 ELECTROCARDIOGRAM REPORT: CPT | Performed by: INTERNAL MEDICINE

## 2020-01-27 PROCEDURE — 93005 ELECTROCARDIOGRAM TRACING: CPT

## 2020-01-27 RX ADMIN — SODIUM CHLORIDE 1000 ML: 0.9 INJECTION, SOLUTION INTRAVENOUS at 08:58

## 2020-01-27 NOTE — DISCHARGE INSTRUCTIONS
Heart Palpitations   WHAT YOU NEED TO KNOW:   Heart palpitations are feelings that your heart races, jumps, throbs, or flutters  You may feel extra beats, no beats for a short time, or skipped beats  You may have these feelings in your chest, throat, or neck  They may happen when you are sitting, standing, or lying  Heart palpitations may be frightening, but are usually not caused by a serious problem  DISCHARGE INSTRUCTIONS:   Call 911 or have someone else call for any of the following: You have any of the following signs of a heart attack:      Squeezing, pressure, or pain in your chest that lasts longer than 5 minutes or returns    Discomfort or pain in your back, neck, jaw, stomach, or arm     Trouble breathing    Nausea or vomiting    Lightheadedness or a sudden cold sweat, especially with chest pain or trouble breathing    You have any of the following signs of a stroke:      Numbness or drooping on one side of your face     Weakness in an arm or leg    Confusion or difficulty speaking    Dizziness, a severe headache, or vision loss    You faint or lose consciousness  Return to the emergency department if:   Your palpitations happen more often or get more intense  Follow up with your healthcare provider as directed: You may need to follow up with a cardiologist if you weren't already referred  Help prevent heart palpitations:   Manage stress and anxiety  Find ways to relax such as listening to music, meditating, or doing yoga  Exercise can also help decrease stress and anxiety  Talk to someone you trust about your stress or anxiety  You can also talk to a therapist      Get plenty of sleep every night  Ask your healthcare provider how much sleep you need each night  Do not drink caffeine or alcohol  Caffeine and alcohol can make your palpitations worse  Caffeine is found in soda, coffee, tea, chocolate, and drinks that increase your energy  Do not smoke    Nicotine and other chemicals in cigarettes and cigars may damage your heart and blood vessels  Ask your healthcare provider for information if you currently smoke and need help to quit  E-cigarettes or smokeless tobacco still contain nicotine  Talk to your healthcare provider before you use these products

## 2020-01-27 NOTE — ED PROVIDER NOTES
History  Chief Complaint   Patient presents with    Palpitations     pt reports palpitations that started this morning while getting dressed, pt denies pain  rpeorts hx of palpitations and anemia       History provided by:  Patient  Palpitations   Palpitations quality:  Fast  Onset quality:  Sudden  Duration:  2 hours  Timing:  Constant  Progression:  Waxing and waning  Chronicity:  Recurrent  Relieved by:  None tried  Associated symptoms: no chest pain, no cough, no diaphoresis, no dizziness, no leg pain, no lower extremity edema, no nausea, no near-syncope, no shortness of breath, no syncope, no vomiting and no weakness    Risk factors: no diabetes mellitus and no hypercoagulable state        Prior to Admission Medications   Prescriptions Last Dose Informant Patient Reported? Taking? Multiple Vitamin (MULTIVITAMIN) tablet   Yes Yes   Sig: Take 1 tablet by mouth daily   ferrous gluconate (FERGON) 324 mg tablet   No Yes   Sig: Take 1 tablet (324 mg total) by mouth 2 (two) times a day before meals      Facility-Administered Medications: None       History reviewed  No pertinent past medical history  Past Surgical History:   Procedure Laterality Date    EXPLORATORY LAPAROTOMY      [place] COLONOSCOPY FLX DX W/COLLJ SPEC WHEN PFRMD N/A 8/1/2018    Procedure: COLONOSCOPY;  Surgeon: Kay Yusuf MD;  Location: [facility]; Service: Colorectal       History reviewed  No pertinent family history  I have reviewed and agree with the history as documented  Social History     Tobacco Use    Smoking status: Never Smoker    Smokeless tobacco: Never Used   Substance Use Topics    Alcohol use: No    Drug use: No        Review of Systems   Constitutional: Negative for appetite change, chills, diaphoresis and fever  HENT: Negative for sore throat  Respiratory: Negative for cough, shortness of breath and wheezing  Cardiovascular: Positive for palpitations  Negative for chest pain, syncope and near-syncope  Gastrointestinal: Positive for blood in stool (chronic, hemorrhoids)  Negative for abdominal pain, diarrhea, nausea and vomiting  Genitourinary: Negative for dysuria and hematuria  Musculoskeletal: Negative for neck pain  Skin: Negative for rash  Neurological: Negative for dizziness, weakness and headaches  Psychiatric/Behavioral: Negative for suicidal ideas  All other systems reviewed and are negative  Physical Exam  Physical Exam   Constitutional: She is oriented to person, place, and time  Vital signs are normal  She appears well-developed and well-nourished  Non-toxic appearance  HENT:   Head: Normocephalic and atraumatic  Right Ear: Tympanic membrane and external ear normal    Left Ear: Tympanic membrane and external ear normal    Nose: Nose normal    Mouth/Throat: Oropharynx is clear and moist    Eyes: Pupils are equal, round, and reactive to light  Conjunctivae and EOM are normal    Neck: Normal range of motion and full passive range of motion without pain  Neck supple  No Brudzinski's sign and no Kernig's sign noted  Cardiovascular: Normal rate, regular rhythm, normal heart sounds, intact distal pulses and normal pulses  No murmur heard  Pulmonary/Chest: Effort normal and breath sounds normal  No tachypnea  No respiratory distress  She has no wheezes  Abdominal: Soft  Bowel sounds are normal  She exhibits no distension  There is no tenderness  There is no rigidity, no rebound and no guarding  Musculoskeletal: Normal range of motion  Right lower leg: She exhibits no swelling  Left lower leg: She exhibits no swelling  Lymphadenopathy:     She has no cervical adenopathy  Neurological: She is alert and oriented to person, place, and time  She has normal strength and normal reflexes  No cranial nerve deficit or sensory deficit  Coordination and gait normal  GCS eye subscore is 4  GCS verbal subscore is 5  GCS motor subscore is 6  Skin: Skin is warm and dry  Capillary refill takes less than 2 seconds  No rash noted  She is not diaphoretic  No pallor  Psychiatric: She has a normal mood and affect  Her speech is normal and behavior is normal  Judgment and thought content normal  Cognition and memory are normal    Nursing note and vitals reviewed        Vital Signs  ED Triage Vitals [01/27/20 0833]   Temperature Pulse Respirations Blood Pressure SpO2   98 7 °F (37 1 °C) (!) 116 18 128/80 98 %      Temp Source Heart Rate Source Patient Position - Orthostatic VS BP Location FiO2 (%)   Temporal Monitor Sitting Right arm --      Pain Score       No Pain           Vitals:    01/27/20 0833   BP: 128/80   Pulse: (!) 116   Patient Position - Orthostatic VS: Sitting         Visual Acuity      ED Medications  Medications   sodium chloride 0 9 % bolus 1,000 mL (1,000 mL Intravenous New Bag 1/27/20 0858)       Diagnostic Studies  Results Reviewed     Procedure Component Value Units Date/Time    Urine Microscopic [[id number]]  (Abnormal) Collected:  01/27/20 0848    Lab Status:  Final result Specimen:  Urine, Clean Catch Updated:  01/27/20 0938     RBC, UA None Seen /hpf      WBC, UA 0-1 /hpf      Epithelial Cells Occasional /hpf      Bacteria, UA None Seen /hpf     Basic metabolic panel [[id number]]  (Abnormal) Collected:  01/27/20 0857    Lab Status:  Final result Specimen:  Blood from Arm, Right Updated:  01/27/20 0928     Sodium 145 mmol/L      Potassium 3 3 mmol/L      Chloride 107 mmol/L      CO2 30 mmol/L      ANION GAP 8 mmol/L      BUN 14 mg/dL      Creatinine 0 84 mg/dL      Glucose 91 mg/dL      Calcium 9 6 mg/dL      eGFR 94 ml/min/1 73sq m     Narrative:       [place] guidelines for Chronic Kidney Disease (CKD):     Stage 1 with normal or high GFR (GFR > 90 mL/min/1 73 square meters)    Stage 2 Mild CKD (GFR = 60-89 mL/min/1 73 square meters)    Stage 3A Moderate CKD (GFR = 45-59 mL/min/1 73 square meters)    Stage 3B Moderate CKD (GFR = 30-44 mL/min/1 73 square meters)    Stage 4 Severe CKD (GFR = 15-29 mL/min/1 73 square meters)    Stage 5 End Stage CKD (GFR <15 mL/min/1 73 square meters)  Note: GFR calculation is accurate only with a steady state creatinine    CBC and differential [[id number]]  (Abnormal) Collected:  01/27/20 0857    Lab Status:  Final result Specimen:  Blood from Arm, Right Updated:  01/27/20 0904     WBC 4 46 Thousand/uL      RBC 4 95 Million/uL      Hemoglobin 12 5 g/dL      Hematocrit 40 4 %      MCV 82 fL      MCH 25 3 pg      MCHC 30 9 g/dL      RDW 23 4 %      MPV 9 6 fL      Platelets 064 Thousands/uL      nRBC 0 /100 WBCs      Neutrophils Relative 65 %      Immat GRANS % 0 %      Lymphocytes Relative 22 %      Monocytes Relative 11 %      Eosinophils Relative 1 %      Basophils Relative 1 %      Neutrophils Absolute 2 88 Thousands/µL      Immature Grans Absolute 0 01 Thousand/uL      Lymphocytes Absolute 0 96 Thousands/µL      Monocytes Absolute 0 51 Thousand/µL      Eosinophils Absolute 0 05 Thousand/µL      Basophils Absolute 0 05 Thousands/µL     POCT pregnancy, urine [[id number]]  (Normal) Resulted:  01/27/20 0852    Lab Status:  Final result Updated:  01/27/20 0852     EXT PREG TEST UR (Ref: Negative) negative     Control valid    POCT urinalysis dipstick [[id number]]  (Abnormal) Resulted:  01/27/20 0852    Lab Status:  Final result Specimen:  Urine Updated:  01/27/20 0852    Urine Macroscopic, POC [[id number]]  (Abnormal) Collected:  01/27/20 0848    Lab Status:  Final result Specimen:  Urine Updated:  01/27/20 0850     Color, UA Yellow     Clarity, UA Clear     pH, UA 7 0     Leukocytes, UA Trace     Nitrite, UA Negative     Protein, UA Negative mg/dl      Glucose, UA Negative mg/dl      Ketones, UA Negative mg/dl      Urobilinogen, UA 0 2 E U /dl      Bilirubin, UA Negative     Blood, UA Negative     Specific Gravity, UA 1 010    Narrative:       [facility] RESULT                 No orders to display Procedures  ECG 12 Lead Documentation Only  Date/Time: 1/27/2020 8:55 AM  Performed by: Raghu Gutierrez MD  Authorized by: Raghu Gutierrez MD     Rate:     ECG rate:  105  Rhythm:     Rhythm: sinus rhythm    Ectopy:     Ectopy: none    QRS:     QRS axis:  Normal    QRS intervals:  Normal  Conduction:     Conduction: normal    ST segments:     ST segments:  Normal  T waves:     T waves: normal               ED Course                               MDM      Disposition  Final diagnoses:   Palpitations   Sinus tachycardia     Time reflects when diagnosis was documented in both MDM as applicable and the Disposition within this note     Time User Action Codes Description Comment    1/27/2020  9:46 AM Mikalahitesh HALL Add [R00 2] Palpitations     1/27/2020  9:46 AM Nicole العراقي Add [R00 0] Sinus tachycardia       ED Disposition     ED Disposition Condition Date/Time Comment    Discharge Good Mon Jan 27, 2020  9:46 AM [facility] discharge to home/self care  Follow-up Information     Follow up With Specialties Details Why Contact Info    Primary Care  Go to  As needed           Patient's Medications   Discharge Prescriptions    No medications on file     No discharge procedures on file      ED Provider  Electronically Signed by           Raghu Gutierrez MD  01/27/20 7815

## 2020-01-30 ENCOUNTER — TRANSCRIBE ORDERS (OUTPATIENT)
Dept: GASTROENTEROLOGY | Facility: CLINIC | Age: 30
End: 2020-01-30

## 2020-02-10 ENCOUNTER — ANESTHESIA EVENT (OUTPATIENT)
Dept: GASTROENTEROLOGY | Facility: HOSPITAL | Age: 30
End: 2020-02-10

## 2020-02-10 NOTE — ANESTHESIA PREPROCEDURE EVALUATION
Review of Systems/Medical History  Patient summary reviewed  Chart reviewed  No history of anesthetic complications     Cardiovascular  Exercise tolerance (METS): >4,     Pulmonary  Not a smoker , No asthma , No sleep apnea ,        GI/Hepatic    Bowel prep  Comment: IBS     Negative  ROS        Endo/Other  Negative endo/other ROS      GYN  Not currently pregnant , Prior pregnancy/OB history ,          Hematology  Anemia iron deficiency anemia,     Musculoskeletal  Negative musculoskeletal ROS        Neurology  Negative neurology ROS      Psychology   Negative psychology ROS              Physical Exam    Airway    Mallampati score: II  TM Distance: >3 FB  Neck ROM: full     Dental       Cardiovascular  Cardiovascular exam normal    Pulmonary  Pulmonary exam normal     Other Findings  Reviewed difficult intubation card   Pt known to me   Easily ventilated but FOB needed to pass 6 0 ETT   Visualization not difficult      Anesthesia Plan  ASA Score- 2     Anesthesia Type- IV sedation with anesthesia with ASA Monitors  Additional Monitors:   Airway Plan:     Comment: Old chart reviewed  Plan Factors-Patient not instructed to abstain from smoking on day of procedure  Patient did not smoke on day of surgery  Induction- intravenous  Postoperative Plan-     Informed Consent- Anesthetic plan and risks discussed with patient, spouse and mother  I personally reviewed this patient with the CRNA  Discussed and agreed on the Anesthesia Plan with the CRNA  Reinaldo Romano

## 2020-02-10 NOTE — PRE-PROCEDURE INSTRUCTIONS
No outpatient medications have been marked as taking for the 2/11/20 encounter [facility] Encounter) with Loyd Gonzalez [phone]

## 2020-02-11 ENCOUNTER — HOSPITAL ENCOUNTER (OUTPATIENT)
Dept: GASTROENTEROLOGY | Facility: HOSPITAL | Age: 30
Setting detail: OUTPATIENT SURGERY
Discharge: HOME | End: 2020-02-11
Attending: INTERNAL MEDICINE | Admitting: INTERNAL MEDICINE
Payer: COMMERCIAL

## 2020-02-11 ENCOUNTER — ANESTHESIA (OUTPATIENT)
Dept: GASTROENTEROLOGY | Facility: HOSPITAL | Age: 30
End: 2020-02-11

## 2020-02-11 VITALS
RESPIRATION RATE: 16 BRPM | HEART RATE: 81 BPM | OXYGEN SATURATION: 95 % | TEMPERATURE: 97.6 F | DIASTOLIC BLOOD PRESSURE: 60 MMHG | SYSTOLIC BLOOD PRESSURE: 105 MMHG

## 2020-02-11 DIAGNOSIS — D64.9 ANEMIA, UNSPECIFIED TYPE: ICD-10-CM

## 2020-02-11 LAB
EXT PREGNANCY TEST URINE: NEGATIVE
EXT. CONTROL: NORMAL

## 2020-02-11 PROCEDURE — 88305 TISSUE EXAM BY PATHOLOGIST: CPT | Performed by: PATHOLOGY

## 2020-02-11 PROCEDURE — 88341 IMHCHEM/IMCYTCHM EA ADD ANTB: CPT | Performed by: PATHOLOGY

## 2020-02-11 PROCEDURE — 88342 IMHCHEM/IMCYTCHM 1ST ANTB: CPT | Performed by: PATHOLOGY

## 2020-02-11 PROCEDURE — NC001 PR NO CHARGE: Performed by: INTERNAL MEDICINE

## 2020-02-11 PROCEDURE — 81025 URINE PREGNANCY TEST: CPT | Performed by: INTERNAL MEDICINE

## 2020-02-11 PROCEDURE — 43239 EGD BIOPSY SINGLE/MULTIPLE: CPT | Performed by: INTERNAL MEDICINE

## 2020-02-11 PROCEDURE — 45378 DIAGNOSTIC COLONOSCOPY: CPT | Performed by: INTERNAL MEDICINE

## 2020-02-11 RX ORDER — LIDOCAINE HYDROCHLORIDE 10 MG/ML
INJECTION, SOLUTION EPIDURAL; INFILTRATION; INTRACAUDAL; PERINEURAL AS NEEDED
Status: DISCONTINUED | OUTPATIENT
Start: 2020-02-11 | End: 2020-02-11 | Stop reason: SURG

## 2020-02-11 RX ORDER — PROPOFOL 10 MG/ML
INJECTION, EMULSION INTRAVENOUS AS NEEDED
Status: DISCONTINUED | OUTPATIENT
Start: 2020-02-11 | End: 2020-02-11 | Stop reason: SURG

## 2020-02-11 RX ORDER — SODIUM CHLORIDE 9 MG/ML
INJECTION, SOLUTION INTRAVENOUS CONTINUOUS PRN
Status: DISCONTINUED | OUTPATIENT
Start: 2020-02-11 | End: 2020-02-11 | Stop reason: SURG

## 2020-02-11 RX ORDER — SODIUM CHLORIDE 9 MG/ML
50 INJECTION, SOLUTION INTRAVENOUS CONTINUOUS
Status: CANCELLED | OUTPATIENT
Start: 2020-02-11

## 2020-02-11 RX ORDER — SODIUM CHLORIDE 9 MG/ML
125 INJECTION, SOLUTION INTRAVENOUS CONTINUOUS
Status: DISCONTINUED | OUTPATIENT
Start: 2020-02-11 | End: 2020-02-15 | Stop reason: HOSPADM

## 2020-02-11 RX ADMIN — PROPOFOL 50 MG: 10 INJECTION, EMULSION INTRAVENOUS at 12:46

## 2020-02-11 RX ADMIN — PROPOFOL 50 MG: 10 INJECTION, EMULSION INTRAVENOUS at 12:35

## 2020-02-11 RX ADMIN — PROPOFOL 50 MG: 10 INJECTION, EMULSION INTRAVENOUS at 12:52

## 2020-02-11 RX ADMIN — PROPOFOL 40 MG: 10 INJECTION, EMULSION INTRAVENOUS at 12:55

## 2020-02-11 RX ADMIN — LIDOCAINE HYDROCHLORIDE 50 MG: 10 INJECTION, SOLUTION EPIDURAL; INFILTRATION; INTRACAUDAL; PERINEURAL at 12:32

## 2020-02-11 RX ADMIN — SODIUM CHLORIDE 125 ML/HR: 0.9 INJECTION, SOLUTION INTRAVENOUS at 09:41

## 2020-02-11 RX ADMIN — PROPOFOL 50 MG: 10 INJECTION, EMULSION INTRAVENOUS at 12:34

## 2020-02-11 RX ADMIN — PROPOFOL 70 MG: 10 INJECTION, EMULSION INTRAVENOUS at 12:32

## 2020-02-11 RX ADMIN — PROPOFOL 50 MG: 10 INJECTION, EMULSION INTRAVENOUS at 12:49

## 2020-02-11 RX ADMIN — SODIUM CHLORIDE: 0.9 INJECTION, SOLUTION INTRAVENOUS at 12:50

## 2020-02-11 RX ADMIN — PROPOFOL 70 MG: 10 INJECTION, EMULSION INTRAVENOUS at 12:33

## 2020-02-11 RX ADMIN — SODIUM CHLORIDE: 0.9 INJECTION, SOLUTION INTRAVENOUS at 12:28

## 2020-02-11 NOTE — H&P
History and Physical -  [facility]  Milo Anderson 26 y o  female MRN: [id number]    HPI: Milo Anderson is a 26y o  year old female who presents with anemia and rectal bleeding and history of hemorrhoids  Review of Systems    Historical Information   Past Medical History:   Diagnosis Date    Anemia     iron def    History of transfusion     12/2019    Irritable bowel syndrome      Past Surgical History:   Procedure Laterality Date    COLONOSCOPY      EXPLORATORY LAPAROTOMY      [place] COLONOSCOPY FLX DX W/COLLJ SPEC WHEN PFRMD N/A 8/1/2018    Procedure: COLONOSCOPY;  Surgeon: Radha Teixeira MD;  Location: [facility]; Service: Colorectal     Social History   Social History     Substance and Sexual Activity   Alcohol Use No     Social History     Substance and Sexual Activity   Drug Use No     Social History     Tobacco Use   Smoking Status Never Smoker   Smokeless Tobacco Never Used     Family History   Problem Relation Age of Onset    Diabetes Mother    Ankti Flower HIV Father        Meds/Allergies       (Not in a hospital admission)    No Known Allergies    Objective     /70   Pulse 100   Temp 98 9 °F (37 2 °C) (Temporal)   Resp 16   LMP 12/24/2019   SpO2 100%       PHYSICAL EXAM    Gen: NAD  CV: RRR  CHEST: Clear  ABD: soft, NT/ND  EXT: no edema  Neuro: AAO      ASSESSMENT/PLAN:  This is a 26y o  year old female here for EGD and colonoscopy for anemia, rectal bleeding and history of hemorrhoids  PLAN:   Procedure:  EGD/colonoscopy

## 2020-02-11 NOTE — DISCHARGE INSTRUCTIONS

## 2020-02-11 NOTE — ANESTHESIA POSTPROCEDURE EVALUATION
Post-Op Assessment Note    CV Status:  Stable  Pain Score: 0    Pain management: adequate     Mental Status:  Alert and awake   Hydration Status:  Euvolemic   PONV Controlled:  Controlled   Airway Patency:  Patent   Post Op Vitals Reviewed: Yes      Staff: CRNA           BP   112/55   Temp      Pulse  55   Resp   16   SpO2   98%

## 2020-02-12 NOTE — NURSING NOTE
Pt is awake,alert,tolerated diet, passing flatus  OOB ambulatory  Seen and instructed by Dr Chanel Terry  Pt and family verbalize an understanding of all instructions and voice no questions or complaints

## 2020-02-14 ENCOUNTER — HOSPITAL ENCOUNTER (EMERGENCY)
Facility: HOSPITAL | Age: 30
Discharge: HOME | End: 2020-02-14
Attending: EMERGENCY MEDICINE | Admitting: EMERGENCY MEDICINE
Payer: COMMERCIAL

## 2020-02-14 VITALS
HEART RATE: 84 BPM | DIASTOLIC BLOOD PRESSURE: 65 MMHG | WEIGHT: 101.41 LBS | SYSTOLIC BLOOD PRESSURE: 117 MMHG | OXYGEN SATURATION: 99 % | BODY MASS INDEX: 18.55 KG/M2 | RESPIRATION RATE: 18 BRPM | TEMPERATURE: 98.4 F

## 2020-02-14 DIAGNOSIS — K62.5 BRBPR (BRIGHT RED BLOOD PER RECTUM): Primary | ICD-10-CM

## 2020-02-14 LAB
ANION GAP SERPL CALCULATED.3IONS-SCNC: 8 MMOL/L (ref 4–13)
ATRIAL RATE: 89 BPM
BASOPHILS # BLD AUTO: 0.04 THOUSANDS/ΜL (ref 0–0.1)
BASOPHILS NFR BLD AUTO: 1 % (ref 0–1)
BUN SERPL-MCNC: 11 MG/DL (ref 5–25)
CALCIUM SERPL-MCNC: 9.5 MG/DL (ref 8.3–10.1)
CHLORIDE SERPL-SCNC: 103 MMOL/L (ref 100–108)
CO2 SERPL-SCNC: 30 MMOL/L (ref 21–32)
CREAT SERPL-MCNC: 0.86 MG/DL (ref 0.6–1.3)
EOSINOPHIL # BLD AUTO: 0.03 THOUSAND/ΜL (ref 0–0.61)
EOSINOPHIL NFR BLD AUTO: 1 % (ref 0–6)
ERYTHROCYTE [DISTWIDTH] IN BLOOD BY AUTOMATED COUNT: 19.1 % (ref 11.6–15.1)
GFR SERPL CREATININE-BSD FRML MDRD: 91 ML/MIN/1.73SQ M
GLUCOSE SERPL-MCNC: 95 MG/DL (ref 65–140)
HCT VFR BLD AUTO: 39.9 % (ref 34.8–46.1)
HGB BLD-MCNC: 12.6 G/DL (ref 11.5–15.4)
IMM GRANULOCYTES # BLD AUTO: 0.01 THOUSAND/UL (ref 0–0.2)
IMM GRANULOCYTES NFR BLD AUTO: 0 % (ref 0–2)
LYMPHOCYTES # BLD AUTO: 1.35 THOUSANDS/ΜL (ref 0.6–4.47)
LYMPHOCYTES NFR BLD AUTO: 32 % (ref 14–44)
MCH RBC QN AUTO: 26.7 PG (ref 26.8–34.3)
MCHC RBC AUTO-ENTMCNC: 31.6 G/DL (ref 31.4–37.4)
MCV RBC AUTO: 85 FL (ref 82–98)
MONOCYTES # BLD AUTO: 0.34 THOUSAND/ΜL (ref 0.17–1.22)
MONOCYTES NFR BLD AUTO: 8 % (ref 4–12)
NEUTROPHILS # BLD AUTO: 2.5 THOUSANDS/ΜL (ref 1.85–7.62)
NEUTS SEG NFR BLD AUTO: 58 % (ref 43–75)
NRBC BLD AUTO-RTO: 0 /100 WBCS
P AXIS: 76 DEGREES
PLATELET # BLD AUTO: 222 THOUSANDS/UL (ref 149–390)
POTASSIUM SERPL-SCNC: 3.7 MMOL/L (ref 3.5–5.3)
PR INTERVAL: 136 MS
QRS AXIS: 81 DEGREES
QRSD INTERVAL: 80 MS
QT INTERVAL: 348 MS
QTC INTERVAL: 423 MS
RBC # BLD AUTO: 4.72 MILLION/UL (ref 3.81–5.12)
SODIUM SERPL-SCNC: 141 MMOL/L (ref 136–145)
T WAVE AXIS: 59 DEGREES
TROPONIN I SERPL-MCNC: <0.02 NG/ML
VENTRICULAR RATE: 89 BPM
WBC # BLD AUTO: 4.27 THOUSAND/UL (ref 4.31–10.16)

## 2020-02-14 PROCEDURE — 84484 ASSAY OF TROPONIN QUANT: CPT | Performed by: EMERGENCY MEDICINE

## 2020-02-14 PROCEDURE — 93010 ELECTROCARDIOGRAM REPORT: CPT

## 2020-02-14 PROCEDURE — 80048 BASIC METABOLIC PNL TOTAL CA: CPT | Performed by: EMERGENCY MEDICINE

## 2020-02-14 PROCEDURE — 99283 EMERGENCY DEPT VISIT LOW MDM: CPT | Performed by: EMERGENCY MEDICINE

## 2020-02-14 PROCEDURE — 85025 COMPLETE CBC W/AUTO DIFF WBC: CPT | Performed by: EMERGENCY MEDICINE

## 2020-02-14 PROCEDURE — 93005 ELECTROCARDIOGRAM TRACING: CPT

## 2020-02-14 PROCEDURE — 99285 EMERGENCY DEPT VISIT HI MDM: CPT

## 2020-02-14 PROCEDURE — 36415 COLL VENOUS BLD VENIPUNCTURE: CPT | Performed by: EMERGENCY MEDICINE

## 2020-02-20 ENCOUNTER — OFFICE VISIT (OUTPATIENT)
Dept: CARDIOLOGY CLINIC | Facility: CLINIC | Age: 30
End: 2020-02-20
Payer: COMMERCIAL

## 2020-02-20 VITALS
WEIGHT: 104.3 LBS | BODY MASS INDEX: 19.19 KG/M2 | HEART RATE: 80 BPM | OXYGEN SATURATION: 99 % | DIASTOLIC BLOOD PRESSURE: 50 MMHG | HEIGHT: 62 IN | SYSTOLIC BLOOD PRESSURE: 110 MMHG

## 2020-02-20 DIAGNOSIS — R07.89 CHEST PAIN, ATYPICAL: Primary | ICD-10-CM

## 2020-02-20 DIAGNOSIS — R00.2 PALPITATIONS: ICD-10-CM

## 2020-02-20 PROCEDURE — 99243 OFF/OP CNSLTJ NEW/EST LOW 30: CPT | Performed by: NURSE PRACTITIONER

## 2020-02-20 NOTE — PROGRESS NOTES
Cardiology Consultation     Snehal Hobbs  [id number]  1990  [facility] [place]  [address] [place] [id number]    Cardiology consultation due to palpitations    Ms Snehal Hobbs  Presented to [facility] Emergency Room on 1/27/20 with complaint of  Palpitations starting while she was getting dressed  HR in  BPM, ST without ST T wave abnormality  Potassium 3 3  She was treated with IV Fluids  Elyce Child was discharged home  Ms Snehal Hobbs presents to our office with complaints of intermittent chest pain  She denies further palpitations  Elycdoug Child has been experiencing mid sternal and left lateral Chest pressure  She cannot describe the quality of the chest pressure  This began after her colonoscopy on  2/12/20  CP occurring on and off with activity, 6/10, non radiating not associated with Symptoms of shortness of breath, diaphoresis, nausea lightheadedness or dizziness  She admits to stress and anxiety  She recently adopted a baby  She will undergo surgery in the near future for bleeding hemorrhoids         Denies ETOH use  Denies Tobacco use  Family hx of CAD in grandfather in 63's  Father and aunt + CVA  Patient Active Problem List   Diagnosis    Symptomatic anemia    Thrombocytosis    History of hemorrhoids     Past Medical History:   Diagnosis Date    Anemia     iron def    History of transfusion     12/2019    Irritable bowel syndrome      Social History     Socioeconomic History    Marital status: /Civil Union     Spouse name: Not on file    Number of children: Not on file    Years of education: Not on file    Highest education level: Not on file   Occupational History    Not on file   Social Needs    Financial resource strain: Not on file    Food insecurity:     Worry: Not on file     Inability: Not on file    Transportation needs:     Medical: Not on file     Non-medical: Not on file   Tobacco Use    Smoking status: Never Smoker    Smokeless tobacco: Never Used   Substance and Sexual Activity    Alcohol use: No    Drug use: No    Sexual activity: Not on file   Lifestyle    Physical activity:     Days per week: Not on file     Minutes per session: Not on file    Stress: Not on file   Relationships    Social connections:     Talks on phone: Not on file     Gets together: Not on file     Attends Yarsani service: Not on file     Active member of club or organization: Not on file     Attends meetings of clubs or organizations: Not on file     Relationship status: Not on file    Intimate partner violence:     Fear of current or ex partner: Not on file     Emotionally abused: Not on file     Physically abused: Not on file     Forced sexual activity: Not on file   Other Topics Concern    Not on file   Social History Narrative    Not on file      Family History   Problem Relation Age of Onset    Diabetes Mother     HIV Father      Past Surgical History:   Procedure Laterality Date    COLONOSCOPY      EXPLORATORY LAPAROTOMY      VT COLONOSCOPY FLX DX W/COLLJ [address] [facility] [address] WHEN PFRMD N/A 8/1/2018    Procedure: COLONOSCOPY;  Surgeon: Felicia Villegas MD;  Location: [facility];   Service: Colorectal       Current Outpatient Medications:     ferrous gluconate (FERGON) 324 mg tablet, Take 1 tablet (324 mg total) by mouth 2 (two) times a day before meals, Disp: , Rfl: 0    hydrocortisone-pramoxine (PROCTOFOAM-HC) rectal foam, Insert 1 applicator into the rectum 2 (two) times a day for 7 days, Disp: 10 g, Rfl: 0    Multiple Vitamin (MULTIVITAMIN) tablet, Take 1 tablet by mouth daily, Disp: , Rfl:   No Known Allergies  Vitals:    02/20/20 1651   BP: 110/50   BP Location: Right arm   Patient Position: Sitting   Cuff Size: Standard   Pulse: 80   SpO2: 99%   Weight: 47 3 kg (104 lb 4 8 oz)   Height: 5' 2" (1 575 m)       Labs:  Admission on 02/14/2020, Discharged on 02/14/2020   Component Date Value  WBC 02/14/2020 4 27*    RBC 02/14/2020 4 72     Hemoglobin 02/14/2020 12 6     Hematocrit 02/14/2020 39 9     MCV 02/14/2020 85     MCH 02/14/2020 26 7*    MCHC 02/14/2020 31 6     RDW 02/14/2020 19 1*    Platelets 50/11/7077 222     nRBC 02/14/2020 0     Neutrophils Relative 02/14/2020 58     Immat GRANS % 02/14/2020 0     Lymphocytes Relative 02/14/2020 32     Monocytes Relative 02/14/2020 8     Eosinophils Relative 02/14/2020 1     Basophils Relative 02/14/2020 1     Neutrophils Absolute 02/14/2020 2 50     Immature Grans Absolute 02/14/2020 0 01     Lymphocytes Absolute 02/14/2020 1 35     Monocytes Absolute 02/14/2020 0 34     Eosinophils Absolute 02/14/2020 0 03     Basophils Absolute 02/14/2020 0 04     Sodium 02/14/2020 141     Potassium 02/14/2020 3 7     Chloride 02/14/2020 103     CO2 02/14/2020 30     ANION GAP 02/14/2020 8     BUN 02/14/2020 11     Creatinine 02/14/2020 0 86     Glucose 02/14/2020 95     Calcium 02/14/2020 9 5     eGFR 02/14/2020 91     Troponin I 02/14/2020 <0 02     Ventricular Rate 02/14/2020 89     Atrial Rate 02/14/2020 89     CA Interval 02/14/2020 136     QRSD Interval 02/14/2020 80     QT Interval 02/14/2020 348     QTC Interval 02/14/2020 423     P Axis 02/14/2020 76     QRS Axis 02/14/2020 81     [facility] on 02/[facility]/2020   Component Date Value    EXT Preg Test, Ur 02/11/2020 Negative     Control 02/11/2020 Valid     Case Report 02/11/2020                      Value:Surgical Pathology Report                         Case: [id number]                                   Authorizing Provider:  Bonnie Mandel MD             Collected:           02/11/2020 1236              Ordering Location:     [facility] Received:            02/11/2020 1402                                     Heart Endoscopy                                                              Pathologist:           Jimmie Rees MD Efren                                                                Specimens:   A) - Duodenum, Duodenal Bx                                                                          B) - Stomach, Gastric Bx                                                                            C) - Polyp, Stomach/Small Intestine, Gastric body polyp                                    Final Diagnosis 02/11/2020                      Value: This result contains rich text formatting which cannot be displayed here   Microscopic Description 02/11/2020                      Value: This result contains rich text formatting which cannot be displayed here   Note 02/11/2020                      Value: This result contains rich text formatting which cannot be displayed here   Additional Information 02/11/2020                      Value: This result contains rich text formatting which cannot be displayed here  Jaja Kinney Gross Description 02/11/2020                      Value: This result contains rich text formatting which cannot be displayed here      Clinical Information 02/11/2020                      Value:Anemia, unspecified type   Admission on 01/27/2020, Discharged on 01/27/2020   Component Date Value    Ventricular Rate 01/27/2020 105     Atrial Rate 01/27/2020 105     [place] Interval 01/27/2020 136     QRSD Interval 01/27/2020 82     QT Interval 01/27/2020 342     QTC Interval 01/27/2020 452     P Axis 01/27/2020 63     QRS Axis 01/27/2020 76     T Wave Axis 01/27/2020 21     Ventricular Rate 01/27/2020 109     Atrial Rate 01/27/2020 109     [place] Interval 01/27/2020 126     QRSD Interval 01/27/2020 82     QT Interval 01/27/2020 326     QTC Interval 01/27/2020 439     P Axis 01/27/2020 65     QRS Axis 01/27/2020 81     T Wave Axis 01/27/2020 39     Color, UA 01/27/2020 Yellow     Clarity, UA 01/27/2020 Clear     pH, UA 01/27/2020 7 0     Leukocytes, UA 01/27/2020 Trace*    Nitrite, UA 01/27/2020 Negative     Protein, UA 01/27/2020 Negative     Glucose, UA 01/27/2020 Negative     Ketones, UA 01/27/2020 Negative     Urobilinogen, UA 01/27/2020 0 2     Bilirubin, UA 01/27/2020 Negative     Blood, UA 01/27/2020 Negative     Specific Gravity, UA 01/27/2020 1 010     RBC, UA 01/27/2020 None Seen     WBC, UA 01/27/2020 0-1*    Epithelial Cells 01/27/2020 Occasional     Bacteria, UA 01/27/2020 None Seen     Sodium 01/27/2020 145     Potassium 01/27/2020 3 3*    Chloride 01/27/2020 107     CO2 01/27/2020 30     ANION GAP 01/27/2020 8     BUN 01/27/2020 14     Creatinine 01/27/2020 0 84     Glucose 01/27/2020 91     Calcium 01/27/2020 9 6     eGFR 01/27/2020 94     WBC 01/27/2020 4 46     RBC 01/27/2020 4 95     Hemoglobin 01/27/2020 12 5     Hematocrit 01/27/2020 40 4     MCV 01/27/2020 82     MCH 01/27/2020 25 3*    MCHC 01/27/2020 30 9*    RDW 01/27/2020 23 4*    MPV 01/27/2020 9 6     Platelets 52/45/4241 392*    nRBC 01/27/2020 0     Neutrophils Relative 01/27/2020 65     Immat GRANS % 01/27/2020 0     Lymphocytes Relative 01/27/2020 22     Monocytes Relative 01/27/2020 11     Eosinophils Relative 01/27/2020 1     Basophils Relative 01/27/2020 1     Neutrophils Absolute 01/27/2020 2 88     Immature Grans Absolute 01/27/2020 0 01     Lymphocytes Absolute 01/27/2020 0 96     Monocytes Absolute 01/27/2020 0 51     Eosinophils Absolute 01/27/2020 0 05     Basophils Absolute 01/27/2020 0 05     EXT PREG TEST UR (Ref: N* 01/27/2020 negative     Control 01/27/2020 valid    Admission on 12/23/2019, Discharged on 12/24/2019   Component Date Value    WBC 12/23/2019 5 66     RBC 12/23/2019 2 82*    Hemoglobin 12/23/2019 4 8*    Hematocrit 12/23/2019 18 9*    MCV 12/23/2019 67*    MCH 12/23/2019 17 0*    MCHC 12/23/2019 25 4*    RDW 12/23/2019 32 7*    MPV 12/23/2019 8 7*    Platelets 29/35/1342 1,268*    nRBC 12/23/2019 1     Neutrophils Relative 12/23/2019 73     Immat GRANS % 12/23/2019 1     Lymphocytes Relative 12/23/2019 15     Monocytes Relative 12/23/2019 9     Eosinophils Relative 12/23/2019 1     Basophils Relative 12/23/2019 1     Neutrophils Absolute 12/23/2019 4 22     Immature Grans Absolute 12/23/2019 0 03     Lymphocytes Absolute 12/23/2019 0 84     Monocytes Absolute 12/23/2019 0 50     Eosinophils Absolute 12/23/2019 0 03     Basophils Absolute 12/23/2019 0 04     Sodium 12/23/2019 138     Potassium 12/23/2019 3 5     Chloride 12/23/2019 104     CO2 12/23/2019 28     ANION GAP 12/23/2019 6     BUN 12/23/2019 12     Creatinine 12/23/2019 0 71     Glucose 12/23/2019 100     Calcium 12/23/2019 8 9     AST 12/23/2019 17     ALT 12/23/2019 19     Alkaline Phosphatase 12/23/2019 72     Total Protein 12/23/2019 8 0     Albumin 12/23/2019 3 5     Total Bilirubin 12/23/2019 1 33*    eGFR 12/23/2019 115     Troponin I 12/23/2019 <0 02     TSH 3RD GENERATON 12/23/2019 2  151     EXT PREG TEST UR (Ref: N* 12/23/2019 negative     Control 12/23/2019 valid     Color, UA 12/23/2019 Yellow     Clarity, UA 12/23/2019 Cloudy     pH, UA 12/23/2019 8 5*    Leukocytes, UA 12/23/2019 Trace*    Nitrite, UA 12/23/2019 Negative     Protein, UA 12/23/2019 100 (2+)*    Glucose, UA 12/23/2019 Negative     Ketones, UA 12/23/2019 Negative     Urobilinogen, UA 12/23/2019 0 2     Bilirubin, UA 12/23/2019 Negative     Blood, UA 12/23/2019 Trace*    Specific Gravity, UA 12/23/2019 1 020     RBC, UA 12/23/2019 None Seen     WBC, UA 12/23/2019 4-10*    Epithelial Cells 12/23/2019 Occasional     Bacteria, UA 12/23/2019 Moderate*    AMORPH PHOSPATES 12/23/2019 Occasional     ABO Grouping 12/23/2019 O     Rh Factor 12/23/2019 Positive     Antibody Screen 12/23/2019 Negative     Specimen Expiration Date 12/23/2019 [id number]     Unit Product Code 12/24/2019 C8335C07     Unit Number 12/24/2019 [id number]     Unit ABO 12/24/2019 O     Unit RH 12/24/2019 POS  Crossmatch 12/24/2019 Compatible     Unit Dispense Status 12/24/2019 Presumed Trans     Unit Product Code 12/24/2019 R1449R90     Unit Number 12/24/2019 [id number]     Unit ABO 12/24/2019 O     Unit RH 12/24/2019 POS     Crossmatch 12/24/2019 Compatible     Unit Dispense Status 12/24/2019 Presumed Trans     Unit Product Code 12/24/2019 [id number]     Unit Number 12/24/2019 [id number]     Unit ABO 12/24/2019 O     Unit RH 12/24/2019 POS     Crossmatch 12/24/2019 Compatible     Unit Dispense Status 12/24/2019 Presumed Trans     ABO Grouping 12/23/2019 O     Rh Factor 12/23/2019 Positive     Troponin I 12/23/2019 <0 02     Haptoglobin 12/23/2019 142     LD 12/23/2019 239*    Ventricular Rate 12/23/2019 107     Atrial Rate 12/23/2019 107     VT Interval 12/23/2019 122     QRSD Interval 12/23/2019 80     QT Interval 12/23/2019 334     QTC Interval 12/23/2019 445     P Axis 12/23/2019 21     QRS Axis 12/23/2019 54     T Wave Axis 12/23/2019 -20     Troponin I 12/23/2019 <0 02     Hemoglobin 12/23/2019 6 4*    Hematocrit 12/23/2019 23 1*    Unit Product Code 12/24/2019 [id number]     Unit Number 12/24/2019 [id number]     Unit ABO 12/24/2019 O     Unit RH 12/24/2019 POS     Crossmatch 12/24/2019 Compatible     Unit Dispense Status 12/24/2019 Presumed Trans     Hemoglobin 12/24/2019 9 9*    Hematocrit 12/24/2019 32 8*    WBC 12/24/2019 5 09     RBC 12/24/2019 4 38     Hemoglobin 12/24/2019 9 8*    Hematocrit 12/24/2019 33 0*    MCV 12/24/2019 75*    MCH 12/24/2019 22 4*    MCHC 12/24/2019 29 7*    RDW 12/24/2019 28 2*    MPV 12/24/2019 8 9     Platelets 69/80/7304 857*    nRBC 12/24/2019 1     Neutrophils Relative 12/24/2019 56     Immat GRANS % 12/24/2019 0     Lymphocytes Relative 12/24/2019 31     Monocytes Relative 12/24/2019 11     Eosinophils Relative 12/24/2019 1     Basophils Relative 12/24/2019 1     Neutrophils Absolute 12/24/2019 2 82     Immature Grans Absolute 12/24/2019 0 02     Lymphocytes Absolute 12/24/2019 1 56     Monocytes Absolute 12/24/2019 0 57     Eosinophils Absolute 12/24/2019 0 05     Basophils Absolute 12/24/2019 0 07     Sodium 12/24/2019 140     Potassium 12/24/2019 3 5     Chloride 12/24/2019 105     CO2 12/24/2019 27     ANION GAP 12/24/2019 8     BUN 12/24/2019 8     Creatinine 12/24/2019 0 70     Glucose 12/24/2019 91     Calcium 12/24/2019 9 1     eGFR 12/24/2019 117     Methylmalonic Acid, S 12/24/2019 88     Disclaimer: 12/24/2019 Comment     Vitamin B-12 12/24/2019 501     Folate 12/24/2019 >20 0*    Iron Saturation 12/24/2019 4     TIBC 12/24/2019 467*    Iron 12/24/2019 18*    Ferritin 12/24/2019 2*    Hemoglobin 12/24/2019 9 7*    Hematocrit 12/24/2019 32 5*     Imaging: No results found  Review of Systems:  Review of Systems   Cardiovascular: Positive for chest pain  Psychiatric/Behavioral: The patient is nervous/anxious  All other systems reviewed and are negative  Physical Exam:  Physical Exam   Constitutional: She is oriented to person, place, and time  She appears well-developed and well-nourished  HENT:   Head: Normocephalic  Eyes: Pupils are equal, round, and reactive to light  Neck: Normal range of motion  Neck supple  Cardiovascular: Normal rate, regular rhythm and normal heart sounds  Pulmonary/Chest: Effort normal and breath sounds normal    Abdominal: Soft  Bowel sounds are normal    Musculoskeletal: Normal range of motion  She exhibits no edema  Neurological: She is alert and oriented to person, place, and time  Skin: Skin is warm and dry  Capillary refill takes less than 2 seconds  There is pallor  Psychiatric: She has a normal mood and affect  Vitals reviewed  Discussion/Summary:  1  Chest pain- atypical, no risk factors for CAD, most likely related to stress and anxiety  No further testing or treatment needed at this time  Return PRN      2  Palpitations- Jamie Part denies further episodes of palpitations since 1/27/20

## 2020-03-04 PROBLEM — K64.9 BLEEDING HEMORRHOIDS: Status: ACTIVE | Noted: 2020-03-04

## 2020-03-09 DIAGNOSIS — A04.8 H. PYLORI INFECTION: Primary | ICD-10-CM

## 2020-03-09 RX ORDER — CLARITHROMYCIN 500 MG/1
500 TABLET, COATED ORAL EVERY 12 HOURS SCHEDULED
Qty: 28 TABLET | Refills: 0 | Status: SHIPPED | OUTPATIENT
Start: 2020-03-09 | End: 2020-03-23

## 2020-03-09 RX ORDER — OMEPRAZOLE 20 MG/1
20 CAPSULE, DELAYED RELEASE ORAL 2 TIMES DAILY
Qty: 28 CAPSULE | Refills: 0 | Status: SHIPPED | OUTPATIENT
Start: 2020-03-09 | End: 2020-06-09

## 2020-03-09 RX ORDER — AMOXICILLIN 500 MG/1
1000 CAPSULE ORAL EVERY 12 HOURS SCHEDULED
Qty: 56 CAPSULE | Refills: 0 | Status: SHIPPED | OUTPATIENT
Start: 2020-03-09 | End: 2020-03-23

## 2020-03-10 ENCOUNTER — TELEPHONE (OUTPATIENT)
Dept: GASTROENTEROLOGY | Facility: MEDICAL CENTER | Age: 30
End: 2020-03-10

## 2020-03-10 NOTE — TELEPHONE ENCOUNTER
----- Message from Carmen Parikh MD sent at 3/9/2020 11:09 AM EDT -----  Please call patient :  pLease let the patient know that her biopsy showed H  Pylori   She will need antibiotics and omeprazole for 2 weeks and then stool test in one month after treatment  Tirso Locus - please call in the treatment please  She also needs a capsule endoscopy for work up of her anemia  Thanks     Simon Manriquez

## 2020-06-16 DIAGNOSIS — K64.9 BLEEDING HEMORRHOIDS: ICD-10-CM

## 2020-06-16 DIAGNOSIS — Z91.89 AT RISK FOR OBSTRUCTIVE SLEEP APNEA: Primary | ICD-10-CM

## 2020-06-16 PROCEDURE — U0003 INFECTIOUS AGENT DETECTION BY NUCLEIC ACID (DNA OR RNA); SEVERE ACUTE RESPIRATORY SYNDROME CORONAVIRUS 2 (SARS-COV-2) (CORONAVIRUS DISEASE [COVID-19]), AMPLIFIED PROBE TECHNIQUE, MAKING USE OF HIGH THROUGHPUT TECHNOLOGIES AS DESCRIBED BY CMS-2020-01-R: HCPCS

## 2020-06-17 LAB — SARS-COV-2 RNA SPEC QL NAA+PROBE: NOT DETECTED

## 2020-06-22 ENCOUNTER — ANESTHESIA EVENT (OUTPATIENT)
Dept: PERIOP | Facility: HOSPITAL | Age: 30
End: 2020-06-22
Payer: COMMERCIAL

## 2020-06-23 ENCOUNTER — ANESTHESIA (OUTPATIENT)
Dept: PERIOP | Facility: HOSPITAL | Age: 30
End: 2020-06-23
Payer: COMMERCIAL

## 2020-06-23 ENCOUNTER — HOSPITAL ENCOUNTER (OUTPATIENT)
Facility: HOSPITAL | Age: 30
Setting detail: OUTPATIENT SURGERY
Discharge: HOME | End: 2020-06-23
Attending: COLON & RECTAL SURGERY | Admitting: COLON & RECTAL SURGERY
Payer: COMMERCIAL

## 2020-06-23 VITALS
HEART RATE: 95 BPM | DIASTOLIC BLOOD PRESSURE: 69 MMHG | OXYGEN SATURATION: 98 % | SYSTOLIC BLOOD PRESSURE: 112 MMHG | RESPIRATION RATE: 18 BRPM | TEMPERATURE: 99 F

## 2020-06-23 DIAGNOSIS — K64.9 BLEEDING HEMORRHOIDS: ICD-10-CM

## 2020-06-23 LAB
ANION GAP SERPL CALCULATED.3IONS-SCNC: 5 MMOL/L (ref 4–13)
BASOPHILS # BLD AUTO: 0.04 THOUSANDS/ΜL (ref 0–0.1)
BASOPHILS NFR BLD AUTO: 1 % (ref 0–1)
BUN SERPL-MCNC: 10 MG/DL (ref 5–25)
CALCIUM SERPL-MCNC: 9.5 MG/DL (ref 8.3–10.1)
CHLORIDE SERPL-SCNC: 105 MMOL/L (ref 100–108)
CO2 SERPL-SCNC: 28 MMOL/L (ref 21–32)
CREAT SERPL-MCNC: 0.7 MG/DL (ref 0.6–1.3)
EOSINOPHIL # BLD AUTO: 0.06 THOUSAND/ΜL (ref 0–0.61)
EOSINOPHIL NFR BLD AUTO: 1 % (ref 0–6)
ERYTHROCYTE [DISTWIDTH] IN BLOOD BY AUTOMATED COUNT: 17.1 % (ref 11.6–15.1)
EXT PREGNANCY TEST URINE: NEGATIVE
EXT. CONTROL: NORMAL
GFR SERPL CREATININE-BSD FRML MDRD: 117 ML/MIN/1.73SQ M
GLUCOSE P FAST SERPL-MCNC: 85 MG/DL (ref 65–99)
GLUCOSE SERPL-MCNC: 85 MG/DL (ref 65–140)
HCT VFR BLD AUTO: 32.7 % (ref 34.8–46.1)
HGB BLD-MCNC: 9.6 G/DL (ref 11.5–15.4)
IMM GRANULOCYTES # BLD AUTO: 0.01 THOUSAND/UL (ref 0–0.2)
IMM GRANULOCYTES NFR BLD AUTO: 0 % (ref 0–2)
LYMPHOCYTES # BLD AUTO: 1.78 THOUSANDS/ΜL (ref 0.6–4.47)
LYMPHOCYTES NFR BLD AUTO: 37 % (ref 14–44)
MCH RBC QN AUTO: 22 PG (ref 26.8–34.3)
MCHC RBC AUTO-ENTMCNC: 29.4 G/DL (ref 31.4–37.4)
MCV RBC AUTO: 75 FL (ref 82–98)
MONOCYTES # BLD AUTO: 0.51 THOUSAND/ΜL (ref 0.17–1.22)
MONOCYTES NFR BLD AUTO: 11 % (ref 4–12)
NEUTROPHILS # BLD AUTO: 2.43 THOUSANDS/ΜL (ref 1.85–7.62)
NEUTS SEG NFR BLD AUTO: 50 % (ref 43–75)
NRBC BLD AUTO-RTO: 0 /100 WBCS
PLATELET # BLD AUTO: 208 THOUSANDS/UL (ref 149–390)
PMV BLD AUTO: 9.1 FL (ref 8.9–12.7)
POTASSIUM SERPL-SCNC: 3.4 MMOL/L (ref 3.5–5.3)
RBC # BLD AUTO: 4.37 MILLION/UL (ref 3.81–5.12)
SODIUM SERPL-SCNC: 138 MMOL/L (ref 136–145)
WBC # BLD AUTO: 4.83 THOUSAND/UL (ref 4.31–10.16)

## 2020-06-23 PROCEDURE — 85025 COMPLETE CBC W/AUTO DIFF WBC: CPT | Performed by: COLON & RECTAL SURGERY

## 2020-06-23 PROCEDURE — 88304 TISSUE EXAM BY PATHOLOGIST: CPT | Performed by: PATHOLOGY

## 2020-06-23 PROCEDURE — C9290 INJ, BUPIVACAINE LIPOSOME: HCPCS | Performed by: COLON & RECTAL SURGERY

## 2020-06-23 PROCEDURE — 46260 REMOVE IN/EX HEM GROUPS 2+: CPT | Performed by: COLON & RECTAL SURGERY

## 2020-06-23 PROCEDURE — 80048 BASIC METABOLIC PNL TOTAL CA: CPT | Performed by: COLON & RECTAL SURGERY

## 2020-06-23 RX ORDER — BUPIVACAINE HYDROCHLORIDE AND EPINEPHRINE 2.5; 5 MG/ML; UG/ML
INJECTION, SOLUTION EPIDURAL; INFILTRATION; INTRACAUDAL; PERINEURAL AS NEEDED
Status: DISCONTINUED | OUTPATIENT
Start: 2020-06-23 | End: 2020-06-23 | Stop reason: HOSPADM

## 2020-06-23 RX ORDER — MIDAZOLAM HYDROCHLORIDE 2 MG/2ML
INJECTION, SOLUTION INTRAMUSCULAR; INTRAVENOUS AS NEEDED
Status: DISCONTINUED | OUTPATIENT
Start: 2020-06-23 | End: 2020-06-23 | Stop reason: SURG

## 2020-06-23 RX ORDER — SODIUM CHLORIDE, SODIUM LACTATE, POTASSIUM CHLORIDE, CALCIUM CHLORIDE 600; 310; 30; 20 MG/100ML; MG/100ML; MG/100ML; MG/100ML
125 INJECTION, SOLUTION INTRAVENOUS CONTINUOUS
Status: DISCONTINUED | OUTPATIENT
Start: 2020-06-23 | End: 2020-06-23 | Stop reason: HOSPADM

## 2020-06-23 RX ORDER — ALBUTEROL SULFATE 2.5 MG/3ML
2.5 SOLUTION RESPIRATORY (INHALATION) ONCE AS NEEDED
Status: DISCONTINUED | OUTPATIENT
Start: 2020-06-23 | End: 2020-06-23 | Stop reason: HOSPADM

## 2020-06-23 RX ORDER — DEXAMETHASONE SODIUM PHOSPHATE 10 MG/ML
INJECTION, SOLUTION INTRAMUSCULAR; INTRAVENOUS AS NEEDED
Status: DISCONTINUED | OUTPATIENT
Start: 2020-06-23 | End: 2020-06-23 | Stop reason: SURG

## 2020-06-23 RX ORDER — PROPOFOL 10 MG/ML
INJECTION, EMULSION INTRAVENOUS AS NEEDED
Status: DISCONTINUED | OUTPATIENT
Start: 2020-06-23 | End: 2020-06-23 | Stop reason: SURG

## 2020-06-23 RX ORDER — LIDOCAINE HYDROCHLORIDE 10 MG/ML
INJECTION, SOLUTION EPIDURAL; INFILTRATION; INTRACAUDAL; PERINEURAL AS NEEDED
Status: DISCONTINUED | OUTPATIENT
Start: 2020-06-23 | End: 2020-06-23 | Stop reason: SURG

## 2020-06-23 RX ORDER — FENTANYL CITRATE/PF 50 MCG/ML
50 SYRINGE (ML) INJECTION
Status: DISCONTINUED | OUTPATIENT
Start: 2020-06-23 | End: 2020-06-23 | Stop reason: HOSPADM

## 2020-06-23 RX ORDER — OXYCODONE HYDROCHLORIDE AND ACETAMINOPHEN 5; 325 MG/1; MG/1
1 TABLET ORAL EVERY 4 HOURS PRN
Qty: 20 TABLET | Refills: 0 | Status: SHIPPED | OUTPATIENT
Start: 2020-06-23 | End: 2020-06-28

## 2020-06-23 RX ORDER — SODIUM CHLORIDE, SODIUM LACTATE, POTASSIUM CHLORIDE, CALCIUM CHLORIDE 600; 310; 30; 20 MG/100ML; MG/100ML; MG/100ML; MG/100ML
INJECTION, SOLUTION INTRAVENOUS CONTINUOUS PRN
Status: DISCONTINUED | OUTPATIENT
Start: 2020-06-23 | End: 2020-06-23 | Stop reason: SURG

## 2020-06-23 RX ORDER — FENTANYL CITRATE 50 UG/ML
INJECTION, SOLUTION INTRAMUSCULAR; INTRAVENOUS AS NEEDED
Status: DISCONTINUED | OUTPATIENT
Start: 2020-06-23 | End: 2020-06-23 | Stop reason: SURG

## 2020-06-23 RX ORDER — ONDANSETRON 2 MG/ML
INJECTION INTRAMUSCULAR; INTRAVENOUS AS NEEDED
Status: DISCONTINUED | OUTPATIENT
Start: 2020-06-23 | End: 2020-06-23 | Stop reason: SURG

## 2020-06-23 RX ORDER — LIDOCAINE HYDROCHLORIDE 10 MG/ML
0.5 INJECTION, SOLUTION EPIDURAL; INFILTRATION; INTRACAUDAL; PERINEURAL ONCE AS NEEDED
Status: DISCONTINUED | OUTPATIENT
Start: 2020-06-23 | End: 2020-06-23 | Stop reason: HOSPADM

## 2020-06-23 RX ORDER — HYDROMORPHONE HCL/PF 1 MG/ML
0.5 SYRINGE (ML) INJECTION
Status: DISCONTINUED | OUTPATIENT
Start: 2020-06-23 | End: 2020-06-23 | Stop reason: HOSPADM

## 2020-06-23 RX ORDER — OXYCODONE HYDROCHLORIDE AND ACETAMINOPHEN 5; 325 MG/1; MG/1
2 TABLET ORAL EVERY 4 HOURS PRN
Status: DISCONTINUED | OUTPATIENT
Start: 2020-06-23 | End: 2020-06-23 | Stop reason: HOSPADM

## 2020-06-23 RX ADMIN — ONDANSETRON 4 MG: 2 INJECTION INTRAMUSCULAR; INTRAVENOUS at 09:59

## 2020-06-23 RX ADMIN — DEXAMETHASONE SODIUM PHOSPHATE 10 MG: 10 INJECTION, SOLUTION INTRAMUSCULAR; INTRAVENOUS at 09:41

## 2020-06-23 RX ADMIN — LIDOCAINE HYDROCHLORIDE 25 MG: 10 INJECTION, SOLUTION EPIDURAL; INFILTRATION; INTRACAUDAL; PERINEURAL at 09:39

## 2020-06-23 RX ADMIN — FENTANYL CITRATE 25 MCG: 50 INJECTION, SOLUTION INTRAMUSCULAR; INTRAVENOUS at 09:35

## 2020-06-23 RX ADMIN — LIDOCAINE HYDROCHLORIDE 50 MG: 10 INJECTION, SOLUTION EPIDURAL; INFILTRATION; INTRACAUDAL; PERINEURAL at 09:30

## 2020-06-23 RX ADMIN — FENTANYL CITRATE 25 MCG: 50 INJECTION, SOLUTION INTRAMUSCULAR; INTRAVENOUS at 09:40

## 2020-06-23 RX ADMIN — SODIUM CHLORIDE, SODIUM LACTATE, POTASSIUM CHLORIDE, AND CALCIUM CHLORIDE: .6; .31; .03; .02 INJECTION, SOLUTION INTRAVENOUS at 09:22

## 2020-06-23 RX ADMIN — MIDAZOLAM 2 MG: 1 INJECTION INTRAMUSCULAR; INTRAVENOUS at 09:26

## 2020-06-23 RX ADMIN — FENTANYL CITRATE 25 MCG: 50 INJECTION, SOLUTION INTRAMUSCULAR; INTRAVENOUS at 09:30

## 2020-06-23 RX ADMIN — SODIUM CHLORIDE, SODIUM LACTATE, POTASSIUM CHLORIDE, AND CALCIUM CHLORIDE 125 ML/HR: .6; .31; .03; .02 INJECTION, SOLUTION INTRAVENOUS at 08:05

## 2020-06-23 RX ADMIN — PROPOFOL 200 MG: 10 INJECTION, EMULSION INTRAVENOUS at 09:30

## 2020-06-25 ENCOUNTER — HOSPITAL ENCOUNTER (EMERGENCY)
Facility: HOSPITAL | Age: 30
Discharge: HOME | End: 2020-06-25
Attending: EMERGENCY MEDICINE | Admitting: EMERGENCY MEDICINE
Payer: COMMERCIAL

## 2020-06-25 VITALS
OXYGEN SATURATION: 100 % | DIASTOLIC BLOOD PRESSURE: 53 MMHG | SYSTOLIC BLOOD PRESSURE: 107 MMHG | TEMPERATURE: 98.8 F | HEART RATE: 100 BPM | RESPIRATION RATE: 18 BRPM

## 2020-06-25 DIAGNOSIS — R53.1 WEAKNESS: Primary | ICD-10-CM

## 2020-06-25 LAB
ANION GAP SERPL CALCULATED.3IONS-SCNC: 8 MMOL/L (ref 4–13)
BASOPHILS # BLD AUTO: 0.02 THOUSANDS/ΜL (ref 0–0.1)
BASOPHILS NFR BLD AUTO: 0 % (ref 0–1)
BUN SERPL-MCNC: 12 MG/DL (ref 5–25)
CALCIUM SERPL-MCNC: 9.1 MG/DL (ref 8.3–10.1)
CHLORIDE SERPL-SCNC: 101 MMOL/L (ref 100–108)
CO2 SERPL-SCNC: 29 MMOL/L (ref 21–32)
CREAT SERPL-MCNC: 0.75 MG/DL (ref 0.6–1.3)
EOSINOPHIL # BLD AUTO: 0.01 THOUSAND/ΜL (ref 0–0.61)
EOSINOPHIL NFR BLD AUTO: 0 % (ref 0–6)
ERYTHROCYTE [DISTWIDTH] IN BLOOD BY AUTOMATED COUNT: 17.2 % (ref 11.6–15.1)
GFR SERPL CREATININE-BSD FRML MDRD: 107 ML/MIN/1.73SQ M
GLUCOSE SERPL-MCNC: 102 MG/DL (ref 65–140)
HCT VFR BLD AUTO: 31.1 % (ref 34.8–46.1)
HGB BLD-MCNC: 8.9 G/DL (ref 11.5–15.4)
IMM GRANULOCYTES # BLD AUTO: 0.02 THOUSAND/UL (ref 0–0.2)
IMM GRANULOCYTES NFR BLD AUTO: 0 % (ref 0–2)
LYMPHOCYTES # BLD AUTO: 0.45 THOUSANDS/ΜL (ref 0.6–4.47)
LYMPHOCYTES NFR BLD AUTO: 7 % (ref 14–44)
MCH RBC QN AUTO: 22.1 PG (ref 26.8–34.3)
MCHC RBC AUTO-ENTMCNC: 28.6 G/DL (ref 31.4–37.4)
MCV RBC AUTO: 77 FL (ref 82–98)
MONOCYTES # BLD AUTO: 0.62 THOUSAND/ΜL (ref 0.17–1.22)
MONOCYTES NFR BLD AUTO: 9 % (ref 4–12)
NEUTROPHILS # BLD AUTO: 5.72 THOUSANDS/ΜL (ref 1.85–7.62)
NEUTS SEG NFR BLD AUTO: 84 % (ref 43–75)
NRBC BLD AUTO-RTO: 0 /100 WBCS
PLATELET # BLD AUTO: 196 THOUSANDS/UL (ref 149–390)
PMV BLD AUTO: 9.7 FL (ref 8.9–12.7)
POTASSIUM SERPL-SCNC: 3.5 MMOL/L (ref 3.5–5.3)
RBC # BLD AUTO: 4.02 MILLION/UL (ref 3.81–5.12)
SODIUM SERPL-SCNC: 138 MMOL/L (ref 136–145)
WBC # BLD AUTO: 6.84 THOUSAND/UL (ref 4.31–10.16)

## 2020-06-25 PROCEDURE — 80048 BASIC METABOLIC PNL TOTAL CA: CPT | Performed by: EMERGENCY MEDICINE

## 2020-06-25 PROCEDURE — 85025 COMPLETE CBC W/AUTO DIFF WBC: CPT | Performed by: EMERGENCY MEDICINE

## 2020-06-25 PROCEDURE — 99283 EMERGENCY DEPT VISIT LOW MDM: CPT

## 2020-06-25 PROCEDURE — 36415 COLL VENOUS BLD VENIPUNCTURE: CPT | Performed by: EMERGENCY MEDICINE

## 2020-06-25 PROCEDURE — 99282 EMERGENCY DEPT VISIT SF MDM: CPT | Performed by: EMERGENCY MEDICINE

## 2020-06-25 RX ORDER — ACETAMINOPHEN 325 MG/1
975 TABLET ORAL ONCE
Status: COMPLETED | OUTPATIENT
Start: 2020-06-25 | End: 2020-06-25

## 2020-06-25 RX ADMIN — ACETAMINOPHEN 975 MG: 325 TABLET ORAL at 16:59

## 2020-06-29 ENCOUNTER — HOSPITAL ENCOUNTER (OUTPATIENT)
Facility: HOSPITAL | Age: 30
Setting detail: OBSERVATION
Discharge: HOME | End: 2020-06-30
Attending: EMERGENCY MEDICINE | Admitting: COLON & RECTAL SURGERY
Payer: COMMERCIAL

## 2020-06-29 DIAGNOSIS — K62.5 BRBPR (BRIGHT RED BLOOD PER RECTUM): ICD-10-CM

## 2020-06-29 DIAGNOSIS — Z98.890 S/P HEMORRHOIDECTOMY: ICD-10-CM

## 2020-06-29 DIAGNOSIS — D64.9 SYMPTOMATIC ANEMIA: ICD-10-CM

## 2020-06-29 DIAGNOSIS — Z87.19 S/P HEMORRHOIDECTOMY: ICD-10-CM

## 2020-06-29 DIAGNOSIS — K62.5 RECTAL BLEEDING: Primary | ICD-10-CM

## 2020-06-29 LAB
ABO GROUP BLD: NORMAL
ALBUMIN SERPL BCP-MCNC: 2.8 G/DL (ref 3.5–5)
ALP SERPL-CCNC: 115 U/L (ref 46–116)
ALT SERPL W P-5'-P-CCNC: 78 U/L (ref 12–78)
ANION GAP SERPL CALCULATED.3IONS-SCNC: 7 MMOL/L (ref 4–13)
APTT PPP: 30 SECONDS (ref 23–37)
AST SERPL W P-5'-P-CCNC: 25 U/L (ref 5–45)
BASOPHILS # BLD AUTO: 0.03 THOUSANDS/ΜL (ref 0–0.1)
BASOPHILS NFR BLD AUTO: 0 % (ref 0–1)
BILIRUB SERPL-MCNC: 0.51 MG/DL (ref 0.2–1)
BLD GP AB SCN SERPL QL: NEGATIVE
BUN SERPL-MCNC: 11 MG/DL (ref 5–25)
CALCIUM SERPL-MCNC: 8.1 MG/DL (ref 8.3–10.1)
CHLORIDE SERPL-SCNC: 106 MMOL/L (ref 100–108)
CO2 SERPL-SCNC: 25 MMOL/L (ref 21–32)
CREAT SERPL-MCNC: 0.75 MG/DL (ref 0.6–1.3)
EOSINOPHIL # BLD AUTO: 0.08 THOUSAND/ΜL (ref 0–0.61)
EOSINOPHIL NFR BLD AUTO: 1 % (ref 0–6)
ERYTHROCYTE [DISTWIDTH] IN BLOOD BY AUTOMATED COUNT: 17.6 % (ref 11.6–15.1)
GFR SERPL CREATININE-BSD FRML MDRD: 107 ML/MIN/1.73SQ M
GLUCOSE SERPL-MCNC: 102 MG/DL (ref 65–140)
HCT VFR BLD AUTO: 22.6 % (ref 34.8–46.1)
HGB BLD-MCNC: 6.6 G/DL (ref 11.5–15.4)
IMM GRANULOCYTES # BLD AUTO: 0.05 THOUSAND/UL (ref 0–0.2)
IMM GRANULOCYTES NFR BLD AUTO: 1 % (ref 0–2)
INR PPP: 1.05 (ref 0.84–1.19)
LYMPHOCYTES # BLD AUTO: 1.73 THOUSANDS/ΜL (ref 0.6–4.47)
LYMPHOCYTES NFR BLD AUTO: 17 % (ref 14–44)
MCH RBC QN AUTO: 22.1 PG (ref 26.8–34.3)
MCHC RBC AUTO-ENTMCNC: 29.2 G/DL (ref 31.4–37.4)
MCV RBC AUTO: 76 FL (ref 82–98)
MONOCYTES # BLD AUTO: 0.91 THOUSAND/ΜL (ref 0.17–1.22)
MONOCYTES NFR BLD AUTO: 9 % (ref 4–12)
NEUTROPHILS # BLD AUTO: 7.58 THOUSANDS/ΜL (ref 1.85–7.62)
NEUTS SEG NFR BLD AUTO: 72 % (ref 43–75)
NRBC BLD AUTO-RTO: 0 /100 WBCS
PLATELET # BLD AUTO: 236 THOUSANDS/UL (ref 149–390)
PMV BLD AUTO: 10.7 FL (ref 8.9–12.7)
POTASSIUM SERPL-SCNC: 3.1 MMOL/L (ref 3.5–5.3)
PROT SERPL-MCNC: 6.5 G/DL (ref 6.4–8.2)
PROTHROMBIN TIME: 13.7 SECONDS (ref 11.6–14.5)
RBC # BLD AUTO: 2.99 MILLION/UL (ref 3.81–5.12)
RH BLD: POSITIVE
SODIUM SERPL-SCNC: 138 MMOL/L (ref 136–145)
SPECIMEN EXPIRATION DATE: NORMAL
WBC # BLD AUTO: 10.38 THOUSAND/UL (ref 4.31–10.16)

## 2020-06-29 PROCEDURE — 86920 COMPATIBILITY TEST SPIN: CPT

## 2020-06-29 PROCEDURE — 36430 TRANSFUSION BLD/BLD COMPNT: CPT

## 2020-06-29 PROCEDURE — 86850 RBC ANTIBODY SCREEN: CPT | Performed by: EMERGENCY MEDICINE

## 2020-06-29 PROCEDURE — 80053 COMPREHEN METABOLIC PANEL: CPT | Performed by: EMERGENCY MEDICINE

## 2020-06-29 PROCEDURE — 99285 EMERGENCY DEPT VISIT HI MDM: CPT | Performed by: EMERGENCY MEDICINE

## 2020-06-29 PROCEDURE — 85025 COMPLETE CBC W/AUTO DIFF WBC: CPT | Performed by: EMERGENCY MEDICINE

## 2020-06-29 PROCEDURE — 99285 EMERGENCY DEPT VISIT HI MDM: CPT

## 2020-06-29 PROCEDURE — 85730 THROMBOPLASTIN TIME PARTIAL: CPT | Performed by: EMERGENCY MEDICINE

## 2020-06-29 PROCEDURE — 86901 BLOOD TYPING SEROLOGIC RH(D): CPT | Performed by: EMERGENCY MEDICINE

## 2020-06-29 PROCEDURE — 93005 ELECTROCARDIOGRAM TRACING: CPT

## 2020-06-29 PROCEDURE — 85610 PROTHROMBIN TIME: CPT | Performed by: EMERGENCY MEDICINE

## 2020-06-29 PROCEDURE — 86900 BLOOD TYPING SEROLOGIC ABO: CPT | Performed by: EMERGENCY MEDICINE

## 2020-06-29 PROCEDURE — 36415 COLL VENOUS BLD VENIPUNCTURE: CPT | Performed by: EMERGENCY MEDICINE

## 2020-06-29 RX ORDER — POTASSIUM CHLORIDE 20 MEQ/1
40 TABLET, EXTENDED RELEASE ORAL ONCE
Status: DISCONTINUED | OUTPATIENT
Start: 2020-06-29 | End: 2020-06-30

## 2020-06-29 RX ORDER — POLYETHYLENE GLYCOL 3350 17 G/17G
17 POWDER, FOR SOLUTION ORAL DAILY PRN
COMMUNITY
End: 2020-12-22 | Stop reason: ALTCHOICE

## 2020-06-30 VITALS
TEMPERATURE: 98.6 F | HEART RATE: 83 BPM | RESPIRATION RATE: 16 BRPM | OXYGEN SATURATION: 100 % | WEIGHT: 108 LBS | SYSTOLIC BLOOD PRESSURE: 103 MMHG | BODY MASS INDEX: 21.2 KG/M2 | DIASTOLIC BLOOD PRESSURE: 65 MMHG | HEIGHT: 60 IN

## 2020-06-30 LAB
ANION GAP SERPL CALCULATED.3IONS-SCNC: 6 MMOL/L (ref 4–13)
ATRIAL RATE: 112 BPM
BUN SERPL-MCNC: 6 MG/DL (ref 5–25)
CALCIUM SERPL-MCNC: 8 MG/DL (ref 8.3–10.1)
CHLORIDE SERPL-SCNC: 111 MMOL/L (ref 100–108)
CO2 SERPL-SCNC: 22 MMOL/L (ref 21–32)
CREAT SERPL-MCNC: 0.52 MG/DL (ref 0.6–1.3)
ERYTHROCYTE [DISTWIDTH] IN BLOOD BY AUTOMATED COUNT: 17.6 % (ref 11.6–15.1)
GFR SERPL CREATININE-BSD FRML MDRD: 129 ML/MIN/1.73SQ M
GLUCOSE P FAST SERPL-MCNC: 92 MG/DL (ref 65–99)
GLUCOSE SERPL-MCNC: 92 MG/DL (ref 65–140)
HCT VFR BLD AUTO: 24.5 % (ref 34.8–46.1)
HGB BLD-MCNC: 7.3 G/DL (ref 11.5–15.4)
MCH RBC QN AUTO: 23.2 PG (ref 26.8–34.3)
MCHC RBC AUTO-ENTMCNC: 29.8 G/DL (ref 31.4–37.4)
MCV RBC AUTO: 78 FL (ref 82–98)
P AXIS: 66 DEGREES
PLATELET # BLD AUTO: 199 THOUSANDS/UL (ref 149–390)
PMV BLD AUTO: 10.4 FL (ref 8.9–12.7)
POTASSIUM SERPL-SCNC: 3.8 MMOL/L (ref 3.5–5.3)
PR INTERVAL: 114 MS
QRS AXIS: 80 DEGREES
QRSD INTERVAL: 70 MS
QT INTERVAL: 324 MS
QTC INTERVAL: 442 MS
RBC # BLD AUTO: 3.14 MILLION/UL (ref 3.81–5.12)
SODIUM SERPL-SCNC: 139 MMOL/L (ref 136–145)
T WAVE AXIS: 20 DEGREES
VENTRICULAR RATE: 112 BPM
WBC # BLD AUTO: 10.21 THOUSAND/UL (ref 4.31–10.16)

## 2020-06-30 PROCEDURE — 85027 COMPLETE CBC AUTOMATED: CPT | Performed by: SURGERY

## 2020-06-30 PROCEDURE — 80048 BASIC METABOLIC PNL TOTAL CA: CPT | Performed by: SURGERY

## 2020-06-30 PROCEDURE — P9016 RBC LEUKOCYTES REDUCED: HCPCS

## 2020-06-30 PROCEDURE — 36415 COLL VENOUS BLD VENIPUNCTURE: CPT | Performed by: SURGERY

## 2020-06-30 PROCEDURE — 93010 ELECTROCARDIOGRAM REPORT: CPT | Performed by: INTERNAL MEDICINE

## 2020-06-30 RX ORDER — HEPARIN SODIUM 5000 [USP'U]/ML
5000 INJECTION, SOLUTION INTRAVENOUS; SUBCUTANEOUS EVERY 8 HOURS SCHEDULED
Status: DISCONTINUED | OUTPATIENT
Start: 2020-06-30 | End: 2020-06-30 | Stop reason: HOSPADM

## 2020-06-30 RX ORDER — POLYETHYLENE GLYCOL 3350 17 G/17G
17 POWDER, FOR SOLUTION ORAL DAILY
Status: DISCONTINUED | OUTPATIENT
Start: 2020-06-30 | End: 2020-06-30 | Stop reason: HOSPADM

## 2020-06-30 RX ORDER — SODIUM CHLORIDE 9 MG/ML
125 INJECTION, SOLUTION INTRAVENOUS CONTINUOUS
Status: DISCONTINUED | OUTPATIENT
Start: 2020-06-30 | End: 2020-06-30

## 2020-06-30 RX ORDER — DOXYCYCLINE HYCLATE 50 MG/1
324 CAPSULE, GELATIN COATED ORAL
Status: DISCONTINUED | OUTPATIENT
Start: 2020-06-30 | End: 2020-06-30 | Stop reason: HOSPADM

## 2020-06-30 RX ORDER — POTASSIUM CHLORIDE 20MEQ/15ML
40 LIQUID (ML) ORAL ONCE
Status: COMPLETED | OUTPATIENT
Start: 2020-06-30 | End: 2020-06-30

## 2020-06-30 RX ADMIN — SODIUM CHLORIDE 125 ML/HR: 0.9 INJECTION, SOLUTION INTRAVENOUS at 02:41

## 2020-06-30 RX ADMIN — POTASSIUM CHLORIDE 40 MEQ: 1.5 SOLUTION ORAL at 01:36

## 2020-06-30 NOTE — ED PROVIDER NOTES
History  Chief Complaint   Patient presents with    Rectal Bleeding     Pt had hemorrhoid surgery on the 23  yesterday had 1st bowel movement with no problems, todayhad a larged amount of blood from the rectum  Pt also had a syncopal episode  Per EMS, pts origianl pressure was 50 palp  2 18 guage iv place on left arm and fluids started  26 YOF with history of anemia requiring transfusions, hemorrhoids s/p hemorrhoidectomy on 6/23 who presents for rectal bleeding  Pt states her post-operative course was uneventful up until today when she had a hard bowel movement and had resulting rectal bleeding  She states that she has had 2 previous bowel movements that were non-bloody  Her last bowel movement was more firm than the others and she stood up afterwards and felt a gush of fluid down her legs  She thought she had diarrhea but when she looked down it was a significant amount of blood coming from her rectum  She felt lightheaded and near-syncopal at that time but did not lose consciousness  Her  immediately called 46  At this time, she feels generally weak but denies lightheadedness, chest pain, shortness of breath  She has not had fevers or significant pain around the surgical area  She has been taking miralax but has not been applying any topical medications to the area  Prior to Admission Medications   Prescriptions Last Dose Informant Patient Reported? Taking?    Multiple Vitamin (MULTIVITAMIN) tablet  Self Yes No   Sig: Take 1 tablet by mouth daily   ferrous gluconate (FERGON) 324 mg tablet  Self No No   Sig: Take 1 tablet (324 mg total) by mouth 2 (two) times a day before meals   oxyCODONE-acetaminophen (PERCOCET) 5-325 mg per tablet   No No   Sig: Take 1 tablet by mouth every 4 (four) hours as needed for moderate pain for up to 5 daysMax Daily Amount: 6 tablets   polyethylene glycol (MIRALAX) 17 g packet 6/29/2020 at Unknown time  Yes Yes   Sig: Take 17 g by mouth daily as needed Facility-Administered Medications: None       Past Medical History:   Diagnosis Date    Anemia     iron def    History of transfusion     12/2019    Irritable bowel syndrome        Past Surgical History:   Procedure Laterality Date    COLONOSCOPY      EXPLORATORY LAPAROTOMY      ME COLONOSCOPY FLX DX W/COLLJ SPEC WHEN PFRMD N/A 8/1/2018    Procedure: COLONOSCOPY;  Surgeon: Gianluca Enamorado MD;  Location: [facility]; Service: Colorectal    ME HEMORRHOIDOPEXY BY STAPLING N/A 6/23/2020    Procedure: Hemorrhoidectomy, internal and external,3 column;  Surgeon: Oral Hicks MD;  Location:  MAIN OR;  Service: Colorectal       Family History   Problem Relation Age of Onset    Diabetes Mother     HIV Father     Colon cancer Neg Hx      I have reviewed and agree with the history as documented  E-Cigarette/Vaping    E-Cigarette Use Never User      E-Cigarette/Vaping Substances    Nicotine No     THC No     CBD No     Flavoring No     Other No     Unknown No      Social History     Tobacco Use    Smoking status: Never Smoker    Smokeless tobacco: Never Used   Substance Use Topics    Alcohol use: No    Drug use: No        Review of Systems   Constitutional: Negative for chills, diaphoresis and fever  HENT: Negative for congestion, rhinorrhea and sore throat  Eyes: Negative for visual disturbance  Respiratory: Negative for cough, chest tightness and shortness of breath  Cardiovascular: Negative for chest pain and leg swelling  Gastrointestinal: Positive for blood in stool and rectal pain  Negative for abdominal distention, abdominal pain, diarrhea, nausea and vomiting  Genitourinary: Negative for dysuria, flank pain, frequency and urgency  Musculoskeletal: Negative for back pain and gait problem  Skin: Negative for pallor and rash  Neurological: Positive for weakness and light-headedness  Negative for syncope and headaches     All other systems reviewed and are negative  Physical Exam  ED Triage Vitals   Temperature Pulse Respirations Blood Pressure SpO2   06/29/20 2147 06/29/20 2147 06/29/20 2147 06/29/20 2147 06/29/20 2147   98 2 °F (36 8 °C) (!) 111 20 116/58 100 %      Temp Source Heart Rate Source Patient Position - Orthostatic VS BP Location FiO2 (%)   06/29/20 2147 06/29/20 2147 06/29/20 2147 06/29/20 2147 --   Oral Monitor Lying Right arm       Pain Score       06/30/20 0230       No Pain             Orthostatic Vital Signs  Vitals:    06/30/20 0113 06/30/20 0115 06/30/20 0230 06/30/20 0236   BP: 118/59 117/55 112/55 111/55   Pulse: 100 98 98 98   Patient Position - Orthostatic VS:           Physical Exam   Constitutional: She is oriented to person, place, and time  No distress  HENT:   Head: Normocephalic and atraumatic  Mouth/Throat: Oropharynx is clear and moist    Eyes: Pupils are equal, round, and reactive to light  EOM are normal    Conjunctiva pale  Neck: Normal range of motion  Neck supple  Cardiovascular: Regular rhythm  Tachycardia present  Exam reveals no gallop and no friction rub  No murmur heard  Pulmonary/Chest: Effort normal and breath sounds normal  She has no wheezes  She has no rales  Abdominal: Soft  Bowel sounds are normal  She exhibits no distension  There is no tenderness  Prior surgical scars noted  Genitourinary:   Genitourinary Comments: Significant amount of dried blood around the anus but no active hemorrhage noted  Unable to visualize surgical sutures  No significant swelling or external tenderness  Musculoskeletal: Normal range of motion  She exhibits no edema  Neurological: She is alert and oriented to person, place, and time  Skin: Skin is warm and dry  No rash noted  She is not diaphoretic  There is pallor  Psychiatric: She has a normal mood and affect  Her behavior is normal    Nursing note and vitals reviewed        ED Medications  Medications   ferrous gluconate (FERGON) tablet 324 mg (324 mg Oral Not Given 6/30/20 0641)   polyethylene glycol (MIRALAX) packet 17 g (has no administration in time range)   heparin (porcine) subcutaneous injection 5,000 Units (5,000 Units Subcutaneous Not Given 6/30/20 0612)   sodium chloride 0 9 % infusion (125 mL/hr Intravenous New Bag 6/30/20 0241)   potassium chloride 10 % oral solution 40 mEq (40 mEq Oral Given 6/30/20 0136)       Diagnostic Studies  Results Reviewed     Procedure Component Value Units Date/Time    CBC (With Platelets) [[id number]]  (Abnormal) Collected:  06/30/20 0447    Lab Status:  Final result Specimen:  Blood from Arm, Left Updated:  06/30/20 0532     WBC 10 21 Thousand/uL      RBC 3 14 Million/uL      Hemoglobin 7 3 g/dL      Hematocrit 24 5 %      MCV 78 fL      MCH 23 2 pg      MCHC 29 8 g/dL      RDW 17 6 %      Platelets 216 Thousands/uL      MPV 10 4 fL     Basic metabolic panel [[id number]]  (Abnormal) Collected:  06/30/20 0447    Lab Status:  Final result Specimen:  Blood from Arm, Left Updated:  06/30/20 0532     Sodium 139 mmol/L      Potassium 3 8 mmol/L      Chloride 111 mmol/L      CO2 22 mmol/L      ANION GAP 6 mmol/L      BUN 6 mg/dL      Creatinine 0 52 mg/dL      Glucose 92 mg/dL      Glucose, Fasting 92 mg/dL      Calcium 8 0 mg/dL      eGFR 129 ml/min/1 73sq m     Narrative:       [place] guidelines for Chronic Kidney Disease (CKD):     Stage 1 with normal or high GFR (GFR > 90 mL/min/1 73 square meters)    Stage 2 Mild CKD (GFR = 60-89 mL/min/1 73 square meters)    Stage 3A Moderate CKD (GFR = 45-59 mL/min/1 73 square meters)    Stage 3B Moderate CKD (GFR = 30-44 mL/min/1 73 square meters)    Stage 4 Severe CKD (GFR = 15-29 mL/min/1 73 square meters)    Stage 5 End Stage CKD (GFR <15 mL/min/1 73 square meters)  Note: GFR calculation is accurate only with a steady state creatinine    Platelet count [[id number]]     Lab Status:  No result Specimen:  Blood     CBC and differential [[id number]]  (Abnormal) Collected:  06/29/20 2239    Lab Status:  Final result Specimen:  Blood from Arm, Left Updated:  06/29/20 2340     WBC 10 38 Thousand/uL      RBC 2 99 Million/uL      Hemoglobin 6 6 g/dL      Hematocrit 22 6 %      MCV 76 fL      MCH 22 1 pg      MCHC 29 2 g/dL      RDW 17 6 %      MPV 10 7 fL      Platelets 784 Thousands/uL      nRBC 0 /100 WBCs      Neutrophils Relative 72 %      Immat GRANS % 1 %      Lymphocytes Relative 17 %      Monocytes Relative 9 %      Eosinophils Relative 1 %      Basophils Relative 0 %      Neutrophils Absolute 7 58 Thousands/µL      Immature Grans Absolute 0 05 Thousand/uL      Lymphocytes Absolute 1 73 Thousands/µL      Monocytes Absolute 0 91 Thousand/µL      Eosinophils Absolute 0 08 Thousand/µL      Basophils Absolute 0 03 Thousands/µL     Protime-INR [[id number]]  (Normal) Collected:  06/29/20 2239    Lab Status:  Final result Specimen:  Blood from Arm, Left Updated:  06/29/20 2314     Protime 13 7 seconds      INR 1 05    APTT [[id number]]  (Normal) Collected:  06/29/20 2239    Lab Status:  Final result Specimen:  Blood from Arm, Left Updated:  06/29/20 2314     PTT 30 seconds     Comprehensive metabolic panel [[id number]]  (Abnormal) Collected:  06/29/20 2239    Lab Status:  Final result Specimen:  Blood from Arm, Left Updated:  06/29/20 2303     Sodium 138 mmol/L      Potassium 3 1 mmol/L      Chloride 106 mmol/L      CO2 25 mmol/L      ANION GAP 7 mmol/L      BUN 11 mg/dL      Creatinine 0 75 mg/dL      Glucose 102 mg/dL      Calcium 8 1 mg/dL      AST 25 U/L      ALT 78 U/L      Alkaline Phosphatase 115 U/L      Total Protein 6 5 g/dL      Albumin 2 8 g/dL      Total Bilirubin 0 51 mg/dL      eGFR 107 ml/min/1 73sq m     Narrative:       [place] guidelines for Chronic Kidney Disease (CKD):     Stage 1 with normal or high GFR (GFR > 90 mL/min/1 73 square meters)    Stage 2 Mild CKD (GFR = 60-89 mL/min/1 73 square meters)    Stage 3A Moderate CKD (GFR = 45-59 mL/min/1 73 square meters)    Stage 3B Moderate CKD (GFR = 30-44 mL/min/1 73 square meters)    Stage 4 Severe CKD (GFR = 15-29 mL/min/1 73 square meters)    Stage 5 End Stage CKD (GFR <15 mL/min/1 73 square meters)  Note: GFR calculation is accurate only with a steady state creatinine                 No orders to display         Procedures  ECG 12 Lead Documentation Only  Date/Time: 6/30/2020 10:17 PM  Performed by: Prince Zuly MD  Authorized by: Prince Zuly MD     Indications / Diagnosis:  Tachycardia, blood loss  ECG reviewed by me, the ED Provider: yes    Patient location:  ED  Previous ECG:     Previous ECG:  Compared to current    Comparison ECG info:  02/14/2020    Similarity:  No change  Interpretation:     Interpretation: non-specific    Rate:     ECG rate:  112    ECG rate assessment: tachycardic    Rhythm:     Rhythm: sinus tachycardia    Ectopy:     Ectopy: none    QRS:     QRS axis:  Normal    QRS intervals:  Normal  Conduction:     Conduction: normal    ST segments:     ST segments:  Normal  T waves:     T waves: inverted      Inverted:  V1  Q waves:     Q waves:  V1 and aVR          ED Course  ED Course as of Jun 30 0658   Mon Jun 29, 2020   2305 Comprehensive metabolic panel(!)   2694 Repleted with oral KCl   Potassium(!): 3 1   2314 PTT: 30   2314 INR: 1 05   2347 Transfusing 2U PRBCs  Consent signed  Hemoglobin(!!): 6 6   Tue Jun 30, 2020   0041 Surgery requesting transfusion of only 1U PRBCs          US AUDIT      Most Recent Value   Initial Alcohol Screen: US AUDIT-C    1  How often do you have a drink containing alcohol?  0 Filed at: 06/29/2020 2153   2  How many drinks containing alcohol do you have on a typical day you are drinking? 0 Filed at: 06/29/2020 2153   3b  FEMALE Any Age, or MALE 65+: How often do you have 4 or more drinks on one occassion?   0 Filed at: 06/29/2020 2153   Audit-C Score  0 Filed at: 06/29/2020 2153                  ELIAN/DAST-10      Most Recent Value   How many times in the past year have you    Used an illegal drug or used a prescription medication for non-medical reasons? Never Filed at: 06/29/2020 9945                              [facility]  Number of Diagnoses or Management Options  BRBPR (bright red blood per rectum): new and requires workup  Rectal bleeding: new and requires workup  S/P hemorrhoidectomy: new and requires workup  Symptomatic anemia: new and requires workup  Diagnosis management comments: 26 YOF with BRBPR after hemorrhoidectomy  No active bleeding on exam  Plan to evaluate CBC for anemia  Hgb 6 6  Will transfuse 1U PRBCs  Discussed with colorectal surgery  Pt admitted to their service  Care transferred         Amount and/or Complexity of Data Reviewed  Clinical lab tests: ordered and reviewed  Decide to obtain previous medical records or to obtain history from someone other than the patient: yes  Review and summarize past medical records: yes  Discuss the patient with other providers: yes    Risk of Complications, Morbidity, and/or Mortality  Presenting problems: high  Diagnostic procedures: low  Management options: moderate    Patient Progress  Patient progress: stable        Disposition  Final diagnoses:   Rectal bleeding   BRBPR (bright red blood per rectum)   S/P hemorrhoidectomy   Symptomatic anemia     Time reflects when diagnosis was documented in both MDM as applicable and the Disposition within this note     Time User Action Codes Description Comment    6/30/2020  6:54 AM Corrinne Solid Add [K62 5] Rectal bleeding     6/30/2020  6:54 AM Corrinne Solid Add [K62 5] BRBPR (bright red blood per rectum)     6/30/2020  6:55 AM Corrinne Solid Add [L31 328,  Z87 19] S/P hemorrhoidectomy     6/30/2020  6:55 AM Corrinne Solid Add [D64 9] Symptomatic anemia       ED Disposition     ED Disposition Condition Date/Time Comment    Admit Stable Tue Jun 30, 2020  6:54 AM Case was discussed with white surgery and the patient's admission status was agreed to be Admission Status: observation status to the service of Dr Rajinder Spaulding   Follow-up Information    None         Patient's Medications   Discharge Prescriptions    No medications on file     No discharge procedures on file  PDMP Review     None           ED Provider  Attending physically available and evaluated Rafaela Latif I managed the patient along with the ED Attending      Electronically Signed by         Myriam Glass MD  06/30/20 1640

## 2020-06-30 NOTE — UTILIZATION REVIEW
Initial Clinical Review    Admission: Date/Time/Statement: Admission Orders (From admission, onward)     Ordered        06/30/20 0101  Place in Observation  Once                   Orders Placed This Encounter   Procedures    Place in Observation     Standing Status:   Standing     Number of Occurrences:   1     Order Specific Question:   Admitting Physician     Answer:   Tyrese Damon [[id number]]     Order Specific Question:   Level of Care     Answer:   Med Surg [[id number]]     ED Arrival Information     Expected Arrival Acuity Means of Arrival Escorted By Service Admission Type    - 6/29/2020 21:43 [facility] Col[facility] Emergency    Arrival Complaint    rectal bleed        Chief Complaint   Patient presents with    Rectal Bleeding     Pt had hemorrhoid surgery on the 23  yesterday had 1st bowel movement with no problems, todayhad a larged amount of blood from the rectum  Pt also had a syncopal episode  Per EMS, pts origianl pressure was 50 palp  2 18 guage iv place on left arm and fluids started  Assessment/Plan:   Ms Charles Cody is a 25 yo female who presents to the ED via EMS from home with c/o rectal pain and bleeding  She is POD #7 From hemorrhoidectomy  Has been taking MiraLax  6/28 was 1st BM and she had pain and bleeding which did stop but she had dizziness and lightheadedness, felt weak and she could not stand  H/H in ED was  6 6/22 6 down from pre-op H/H of 8 9/31 1  She was tachycardic  PMH: IBS  She is admitted to OBSERVATION status with Post op rectal pain and bleeding - transfuse 1 u PRBCs, NPO, MiraLax and stool softeners  6/30 today she is post-transfusion with H/H 7 3/24 5  She is no longer tachycardic  Pt to be d/c today        ED Triage Vitals   Temperature Pulse Respirations Blood Pressure SpO2   06/29/20 2147 06/29/20 2147 06/29/20 2147 06/29/20 2147 06/29/20 2147   98 2 °F (36 8 °C) (!) 111 20 116/58 100 %      Temp Source Heart Rate Source Patient Position - Orthostatic VS BP Location FiO2 (%)   06/29/20 2147 06/29/20 2147 06/29/20 2147 06/29/20 2147 --   Oral Monitor Lying Right arm       Pain Score       06/30/20 0230       No Pain        Wt Readings from Last 1 Encounters:   06/30/20 49 kg (108 lb)     Additional Vital Signs:   06/30/20 09:33:04  98 6 °F (37 °C)    16  103/65  78         06/30/20 0715  98 1 °F (36 7 °C)  83  18  124/69    100 %  None (Room air)     06/30/20 0236  99 7 °F (37 6 °C)  98  18  111/55           06/30/20 0230    98  18  112/55  79  98 %       06/30/20 0115    98  18  117/55  78  99 %       06/30/20 0113  99 3 °F (37 4 °C)  100  18  118/59           06/30/20 0054  99 7 °F (37 6 °C)  103  18  126/57           06/29/20 2315    114Abnormal   18  126/61  84  100 %       06/29/20 2310    116Abnormal   19  122/64    100 %  None (Room air)  Lying   06/29/20 2300    108Abnormal   19  122/64  85  100 %         Pertinent Labs/Diagnostic Test Results:     6/29 ECG - ST rate 112, inverted T waves in V1 and Q waves in V1 and aVR          Results from last 7 days   Lab Units 06/30/20 0447 06/29/20 2239 06/25/20  1615   WBC Thousand/uL 10 21* 10 38* 6 84   HEMOGLOBIN g/dL 7 3* 6 6* 8 9*   HEMATOCRIT % 24 5* 22 6* 31 1*   PLATELETS Thousands/uL 199 236 196   NEUTROS ABS Thousands/µL  --  7 58 5 72         Results from last 7 days   Lab Units 06/30/20  0447 06/29/20 2239 06/25/20  1615   SODIUM mmol/L 139 138 138   POTASSIUM mmol/L 3 8 3 1* 3 5   CHLORIDE mmol/L 111* 106 101   CO2 mmol/L 22 25 29   ANION GAP mmol/L 6 7 8   BUN mg/dL 6 11 12   CREATININE mg/dL 0 52* 0 75 0 75   EGFR ml/min/1 73sq m 129 107 107   CALCIUM mg/dL 8 0* 8 1* 9 1     Results from last 7 days   Lab Units 06/29/20  2239   AST U/L 25   ALT U/L 78   ALK PHOS U/L 115   TOTAL PROTEIN g/dL 6 5   ALBUMIN g/dL 2 8*   TOTAL BILIRUBIN mg/dL 0 51     Results from last 7 days   Lab Units 06/30/20  0447 06/29/20  2239 06/25/20  1615 GLUCOSE RANDOM mg/dL 92 102 102     Results from last 7 days   Lab Units 06/29/20  2239   PROTIME seconds 13 7   INR  1 05   PTT seconds 30     ED Treatment:   Medication Administration from 06/29/2020 2143 to 06/30/2020 5637    Date/Time Order Dose Route Action   06/30/2020 0241 sodium chloride 0 9 % infusion 125 mL/hr Intravenous New Bag   06/30/2020 0136 potassium chloride 10 % oral solution 40 mEq 40 mEq Oral Given        Past Medical History:   Diagnosis Date    Anemia     iron def    History of transfusion     12/2019    Irritable bowel syndrome      Present on Admission:   Symptomatic anemia    Admitting Diagnosis: Rectal bleeding [K62 5]  BRBPR (bright red blood per rectum) [K62 5]  S/P hemorrhoidectomy [Z97 456, Z87 19]  Rectal bleed [K62 5]  Symptomatic anemia [D64 9]     Age/Sex: 26 y o  female     Admission Orders:  Scheduled Medications:    Medications:  ferrous gluconate 324 mg Oral BID AC   heparin (porcine) 5,000 Units Subcutaneous Q8H [facility]   polyethylene glycol 17 g Oral Daily     Continuous IV Infusions:     IV 0 9% NSS @ 125 ml/hr stopped on 6/30 @ 0912    PRN Meds:     SCDs  Transfusion 1 U PRBCs  OOB as silvestre   Regular diet   Platelet count      Network Utilization Review Department  [email or web address]  ATTENTION: Please call with any questions or concerns to [phone] and carefully listen to the prompts so that you are directed to the right person  All voicemails are confidential   Marguerite Lozano all requests for admission clinical reviews, approved or denied determinations and any other requests to dedicated fax number below belonging to the campus where the patient is receiving treatment   List of dedicated fax numbers for the Facilities:  [address] DENIALS (Administrative/Medical Necessity) [phone]   [address] (Maternity/NICU/Pediatrics) [facility] [address] [facility] [address] [facility] [phone]   [facility] [address] [facility] [phone]   [facility]  [phone]   [address] [facility], [facility]  [phone]   [address] [place] [address] [address] [facility] [phone]

## 2020-06-30 NOTE — DISCHARGE SUMMARY
Discharge Summary - Luda Rangel 26 y o  female MRN: [id number]    Unit/Bed#: -01 Encounter: [id number]    Admission Date:   Admission Orders (From admission, onward)     Ordered        06/30/20 0101  Place in Observation  Once                     Admitting Diagnosis: Rectal bleeding [K62 5]  BRBPR (bright red blood per rectum) [K62 5]  S/P hemorrhoidectomy [Z98 890, Z87 19]  Rectal bleed [K62 5]  Symptomatic anemia [D64 9]    HPI: Luda Rangel is a 26 y o  female who presents with rectal pain and bleeding  She has been taking miralax since her surgery  Yesterday, she had her first bowel movement  This caused immediate pain and she noticed bleeding  The bleeding did eventually stop, but she came to the ED for evaluation because she was dizzy and light-headed  She did not lose consciousness, but felt weak enough that she could not stand  This has not been a problem for her since the surgery until the bowel movement  Otherwise, she has a history of IBS  No other active issues  Procedures Performed:   Orders Placed This Encounter   Procedures    ED ECG Documentation Only       Summary of Hospital Course: Patient was admitted to the colorectal surgery service  She was transfused 1U pRBC  She did not have any further bloody BMs, and had a bowel movement the next morning  Her Hg responded from 6 6->7 3 and she was no longer symptomatic  She meets criteria for discharge  She will follow up with Dr Deshawn Craft in August with her previously set appointment       Significant Findings, Care, Treatment and Services Provided: 1U pRBC    Complications: None    Discharge Diagnosis: Rectal bleeding [K62 5]  BRBPR (bright red blood per rectum) [K62 5]  S/P hemorrhoidectomy [E58 977, Z87 19]  Rectal bleed [K62 5]  Symptomatic anemia [D64 9]    Resolved Problems  Date Reviewed: 6/23/2020    None          Condition at Discharge: good         Discharge instructions/Information to patient and family:   See after visit summary for information provided to patient and family  Provisions for Follow-Up Care:  See after visit summary for information related to follow-up care and any pertinent home health orders  PCP: No primary care provider on file  Disposition: See After Visit Summary for discharge disposition information  Planned Readmission: No      Discharge Statement   I spent 30 minutes discharging the patient  This time was spent on the day of discharge  I had direct contact with the patient on the day of discharge  Additional documentation is required if more than 30 minutes were spent on discharge  Discharge Medications:  See after visit summary for reconciled discharge medications provided to patient and family

## 2020-06-30 NOTE — PLAN OF CARE
Problem: Potential for Falls  Goal: Patient will remain free of falls  Description  INTERVENTIONS:  - Assess patient frequently for physical needs  -  Identify cognitive and physical deficits and behaviors that affect risk of falls    -  [place] fall precautions as indicated by assessment   - Educate patient/family on patient safety including physical limitations  - Instruct patient to call for assistance with activity based on assessment  - Modify environment to reduce risk of injury  - Consider OT/PT consult to assist with strengthening/mobility  Outcome: Progressing

## 2020-06-30 NOTE — H&P
H&P Exam - Colorectal Surgery   Jossy Miranda 26 y o  female MRN: [id number]  Unit/Bed#: ED 28 Encounter: [id number]    Assessment/Plan     Assessment:  29F POD # 7 from hemorrhoidectomy with rectal pain and bleeding after BM  Plan:  - agree with ED transfusion of 1 unit  - monitor heart rate  - admit to colorectal for obs  - NPO in case intervention needed  - no planned intervention at this time  - repeat labs in the AM  - continue miralax and stool softeners    History of Present Illness   HPI:  Jossy Miranda is a 26 y o  female who presents with rectal pain and bleeding  She has been taking miralax since her surgery  Yesterday, she had her first bowel movement  This caused immediate pain and she noticed bleeding  The bleeding did eventually stop, but she came to the ED for evaluation because she was dizzy and light-headed  She did not lose consciousness, but felt weak enough that she could not stand  This has not been a problem for her since the surgery until the bowel movement  Otherwise, she has a history of IBS  No other active issues  Review of Systems   Constitutional: Negative  Negative for activity change and appetite change  HENT: Negative  Negative for hearing loss and trouble swallowing  Eyes: Negative  Negative for photophobia and redness  Respiratory: Negative  Negative for chest tightness and shortness of breath  Cardiovascular: Negative  Negative for chest pain and palpitations  Gastrointestinal: Positive for anal bleeding, constipation and rectal pain  Negative for abdominal distention, abdominal pain, diarrhea, nausea and vomiting  Endocrine: Negative  Negative for cold intolerance and heat intolerance  Genitourinary: Negative  Negative for flank pain and genital sores  Musculoskeletal: Negative  Negative for arthralgias and back pain  Skin: Negative  Negative for color change and pallor  Allergic/Immunologic: Negative  Neurological: Negative    Negative for facial asymmetry, speech difficulty and light-headedness  Hematological: Negative  Negative for adenopathy  Psychiatric/Behavioral: Negative  Negative for agitation and behavioral problems  Historical Information   Past Medical History:   Diagnosis Date    Anemia     iron def    History of transfusion     12/2019    Irritable bowel syndrome      Past Surgical History:   Procedure Laterality Date    COLONOSCOPY      EXPLORATORY LAPAROTOMY      FL COLONOSCOPY FLX DX W/COLLJ SPEC WHEN PFRMD N/A 8/1/2018    Procedure: COLONOSCOPY;  Surgeon: Charlene Hicks MD;  [facility]; Service: Colorectal    FL HEMORRHOIDOPEXY BY STAPLING N/A 6/23/2020    Procedure: Hemorrhoidectomy, internal and external,3 column;  Surgeon: Shelly Jara MD;  [facility];  Service: Colorectal     Social History   Social History     Substance and Sexual Activity   Alcohol Use No     Social History     Substance and Sexual Activity   Drug Use No     Social History     Tobacco Use   Smoking Status Never Smoker   Smokeless Tobacco Never Used     E-Cigarette/Vaping    E-Cigarette Use Never User      E-Cigarette/Vaping Substances    Nicotine No     THC No     CBD No     Flavoring No     Other No     Unknown No      Family History:   Family History   Problem Relation Age of Onset    Diabetes Mother     HIV Father     Colon cancer Neg Hx        Meds/Allergies   PTA meds:   Prior to Admission Medications   Prescriptions Last Dose Informant Patient Reported? Taking?    Multiple Vitamin (MULTIVITAMIN) tablet  Self Yes No   Sig: Take 1 tablet by mouth daily   ferrous gluconate (FERGON) 324 mg tablet  Self No No   Sig: Take 1 tablet (324 mg total) by mouth 2 (two) times a day before meals   oxyCODONE-acetaminophen (PERCOCET) 5-325 mg per tablet   No No   Sig: Take 1 tablet by mouth every 4 (four) hours as needed for moderate pain for up to 5 daysMax Daily Amount: 6 tablets   polyethylene glycol (MIRALAX) 17 g packet 6/29/2020 at Unknown time  Yes Yes   Sig: Take 17 g by mouth daily as needed      Facility-Administered Medications: None     Allergies   Allergen Reactions    Latex      Condoms  Burning sensation wears normal underwear and can eat banans        Objective   First Vitals:   Blood Pressure: 116/58 (06/29/20 2147)  Pulse: (!) 111 (06/29/20 2147)  Temperature: 98 2 °F (36 8 °C) (06/29/20 2147)  Temp Source: Oral (06/29/20 2147)  Respirations: 20 (06/29/20 2147)  Height: 5' (152 4 cm) (06/29/20 2147)  SpO2: 100 % (06/29/20 2147)    Current Vitals:   Blood Pressure: 126/61 (06/29/20 2315)  Pulse: (!) 114 (06/29/20 2315)  Temperature: 98 2 °F (36 8 °C) (06/29/20 2147)  Temp Source: Oral (06/29/20 2147)  Respirations: 18 (06/29/20 2315)  Height: 5' (152 4 cm) (06/29/20 2147)  SpO2: 100 % (06/29/20 2315)    No intake or output data in the 24 hours ending 06/30/20 0041    Invasive Devices     None                 Physical Exam   Constitutional: She appears well-developed and well-nourished  No distress  HENT:   Head: Normocephalic and atraumatic  Right Ear: External ear normal    Left Ear: External ear normal    Eyes: Pupils are equal, round, and reactive to light  EOM are normal  Right eye exhibits no discharge  Left eye exhibits no discharge  No scleral icterus  Neck: No tracheal deviation present  Cardiovascular: Normal rate  Pulmonary/Chest: Effort normal  No respiratory distress  Abdominal: Soft  She exhibits no distension  There is no tenderness  Genitourinary: Rectal exam shows no external hemorrhoid and no internal hemorrhoid  Genitourinary Comments: Suture lines intact   Neurological: She is alert  Coordination normal    Skin: No rash noted  She is not diaphoretic  Vitals reviewed  Lab Results: I have personally reviewed pertinent lab results  Imaging: I have personally reviewed pertinent reports  EKG, Pathology, and Other Studies: I have personally reviewed pertinent reports  Code Status: Prior  Advance Directive and Living Will:      Power of :    POLST:

## 2020-06-30 NOTE — DISCHARGE INSTRUCTIONS
Call the office or got to the ED with recurrent bleeding or if you feel symptomatic again- tachycardia, lightheaded, short of breath, dizziness  Continue to take stool softeners

## 2020-06-30 NOTE — NURSING NOTE
Pt admitted to floor from ED  VS and assessment WNL  Orders entered to D/C IVs and pt  Pt showering and then awaiting ride home        Claudette Sprung

## 2020-06-30 NOTE — ED NOTES
Per Dr Lora Durbin, only to give one unit of PRBC  Dr Lora Durbin stated she was unable to cancel the 2nd unit       Vinny Castro RN  06/30/20 4219

## 2020-07-01 NOTE — ED ATTENDING ATTESTATION
6/29/2020  I, Flo Chapman MD, saw and evaluated the patient  I have discussed the patient with the resident/non-physician practitioner and agree with the resident's/non-physician practitioner's findings, Plan of Care, and MDM as documented in the resident's/non-physician practitioner's note, except where noted  All available labs and Radiology studies were reviewed  I was present for key portions of any procedure(s) performed by the resident/non-physician practitioner and I was immediately available to provide assistance  At this point I agree with the current assessment done in the Emergency Department  I have conducted an independent evaluation of this patient a history and physical is as follows:   S/p hemorrhoidectomy approx 1 week had large amount of bms today then blood with near syncope Per  was in vianey of blood on floor PE: alert pale tachy lungs clear abd soft clots noted at rectum no active bleeding MDM: check labs transfuse as needed involve colorectal  ED Course         Critical Care Time  CriticalCare Time  Performed by: Flo Chapman MD  Authorized by:  Flo Chapman MD     Critical care provider statement:     Critical care time (minutes):  33    Critical care time was exclusive of:  Separately billable procedures and treating other patients and teaching time    Critical care was necessary to treat or prevent imminent or life-threatening deterioration of the following conditions:  Circulatory failure    Critical care was time spent personally by me on the following activities:  Obtaining history from patient or surrogate, evaluation of patient's response to treatment, examination of patient and ordering and performing treatments and interventions

## 2020-07-03 LAB
ABO GROUP BLD BPU: NORMAL
ABO GROUP BLD BPU: NORMAL
BPU ID: NORMAL
BPU ID: NORMAL
CROSSMATCH: NORMAL
CROSSMATCH: NORMAL
UNIT DISPENSE STATUS: NORMAL
UNIT DISPENSE STATUS: NORMAL
UNIT PRODUCT CODE: NORMAL
UNIT PRODUCT CODE: NORMAL
UNIT RH: NORMAL
UNIT RH: NORMAL

## 2020-07-05 ENCOUNTER — HOSPITAL ENCOUNTER (INPATIENT)
Facility: HOSPITAL | Age: 30
LOS: 3 days | Discharge: HOME | DRG: 920 | End: 2020-07-11
Attending: COLON & RECTAL SURGERY | Admitting: COLON & RECTAL SURGERY
Payer: COMMERCIAL

## 2020-07-05 ENCOUNTER — HOSPITAL ENCOUNTER (EMERGENCY)
Facility: HOSPITAL | Age: 30
DRG: 920 | End: 2020-07-05
Attending: EMERGENCY MEDICINE
Payer: COMMERCIAL

## 2020-07-05 VITALS
RESPIRATION RATE: 17 BRPM | TEMPERATURE: 99.5 F | BODY MASS INDEX: 16.88 KG/M2 | DIASTOLIC BLOOD PRESSURE: 55 MMHG | WEIGHT: 86.42 LBS | OXYGEN SATURATION: 99 % | HEART RATE: 106 BPM | SYSTOLIC BLOOD PRESSURE: 110 MMHG

## 2020-07-05 DIAGNOSIS — R55 PRE-SYNCOPE: ICD-10-CM

## 2020-07-05 DIAGNOSIS — D62 ACUTE BLOOD LOSS ANEMIA: ICD-10-CM

## 2020-07-05 DIAGNOSIS — K62.5 RECTAL BLEEDING: Primary | ICD-10-CM

## 2020-07-05 DIAGNOSIS — K64.9 BLEEDING HEMORRHOIDS: Primary | ICD-10-CM

## 2020-07-05 LAB
ABO GROUP BLD: NORMAL
ALBUMIN SERPL BCP-MCNC: 2.9 G/DL (ref 3.5–5)
ALP SERPL-CCNC: 88 U/L (ref 46–116)
ALT SERPL W P-5'-P-CCNC: 35 U/L (ref 12–78)
ANION GAP SERPL CALCULATED.3IONS-SCNC: 9 MMOL/L (ref 4–13)
AST SERPL W P-5'-P-CCNC: 17 U/L (ref 5–45)
BASOPHILS # BLD AUTO: 0.04 THOUSANDS/ΜL (ref 0–0.1)
BASOPHILS NFR BLD AUTO: 1 % (ref 0–1)
BILIRUB SERPL-MCNC: 0.4 MG/DL (ref 0.2–1)
BLD GP AB SCN SERPL QL: NEGATIVE
BUN SERPL-MCNC: 13 MG/DL (ref 5–25)
CALCIUM SERPL-MCNC: 7.8 MG/DL (ref 8.3–10.1)
CHLORIDE SERPL-SCNC: 104 MMOL/L (ref 100–108)
CO2 SERPL-SCNC: 26 MMOL/L (ref 21–32)
CREAT SERPL-MCNC: 0.81 MG/DL (ref 0.6–1.3)
EOSINOPHIL # BLD AUTO: 0.08 THOUSAND/ΜL (ref 0–0.61)
EOSINOPHIL NFR BLD AUTO: 1 % (ref 0–6)
ERYTHROCYTE [DISTWIDTH] IN BLOOD BY AUTOMATED COUNT: 17.3 % (ref 11.6–15.1)
EXT PREG TEST URINE: NEGATIVE
EXT. CONTROL ED NAV: NORMAL
GFR SERPL CREATININE-BSD FRML MDRD: 98 ML/MIN/1.73SQ M
GLUCOSE SERPL-MCNC: 91 MG/DL (ref 65–140)
HCT VFR BLD AUTO: 21.6 % (ref 34.8–46.1)
HGB BLD-MCNC: 6.4 G/DL (ref 11.5–15.4)
IMM GRANULOCYTES # BLD AUTO: 0.06 THOUSAND/UL (ref 0–0.2)
IMM GRANULOCYTES NFR BLD AUTO: 1 % (ref 0–2)
LYMPHOCYTES # BLD AUTO: 1.73 THOUSANDS/ΜL (ref 0.6–4.47)
LYMPHOCYTES NFR BLD AUTO: 23 % (ref 14–44)
MCH RBC QN AUTO: 23.5 PG (ref 26.8–34.3)
MCHC RBC AUTO-ENTMCNC: 29.6 G/DL (ref 31.4–37.4)
MCV RBC AUTO: 79 FL (ref 82–98)
MONOCYTES # BLD AUTO: 0.8 THOUSAND/ΜL (ref 0.17–1.22)
MONOCYTES NFR BLD AUTO: 11 % (ref 4–12)
NEUTROPHILS # BLD AUTO: 4.89 THOUSANDS/ΜL (ref 1.85–7.62)
NEUTS SEG NFR BLD AUTO: 63 % (ref 43–75)
NRBC BLD AUTO-RTO: 0 /100 WBCS
PLATELET # BLD AUTO: 540 THOUSANDS/UL (ref 149–390)
PMV BLD AUTO: 10.3 FL (ref 8.9–12.7)
POTASSIUM SERPL-SCNC: 3.5 MMOL/L (ref 3.5–5.3)
PROT SERPL-MCNC: 6.6 G/DL (ref 6.4–8.2)
RBC # BLD AUTO: 2.72 MILLION/UL (ref 3.81–5.12)
RH BLD: POSITIVE
SODIUM SERPL-SCNC: 139 MMOL/L (ref 136–145)
SPECIMEN EXPIRATION DATE: NORMAL
WBC # BLD AUTO: 7.6 THOUSAND/UL (ref 4.31–10.16)

## 2020-07-05 PROCEDURE — 81025 URINE PREGNANCY TEST: CPT | Performed by: EMERGENCY MEDICINE

## 2020-07-05 PROCEDURE — 86900 BLOOD TYPING SEROLOGIC ABO: CPT | Performed by: EMERGENCY MEDICINE

## 2020-07-05 PROCEDURE — 86901 BLOOD TYPING SEROLOGIC RH(D): CPT | Performed by: EMERGENCY MEDICINE

## 2020-07-05 PROCEDURE — P9040 RBC LEUKOREDUCED IRRADIATED: HCPCS

## 2020-07-05 PROCEDURE — 96360 HYDRATION IV INFUSION INIT: CPT

## 2020-07-05 PROCEDURE — U0003 INFECTIOUS AGENT DETECTION BY NUCLEIC ACID (DNA OR RNA); SEVERE ACUTE RESPIRATORY SYNDROME CORONAVIRUS 2 (SARS-COV-2) (CORONAVIRUS DISEASE [COVID-19]), AMPLIFIED PROBE TECHNIQUE, MAKING USE OF HIGH THROUGHPUT TECHNOLOGIES AS DESCRIBED BY CMS-2020-01-R: HCPCS | Performed by: SURGERY

## 2020-07-05 PROCEDURE — 86923 COMPATIBILITY TEST ELECTRIC: CPT

## 2020-07-05 PROCEDURE — 93005 ELECTROCARDIOGRAM TRACING: CPT

## 2020-07-05 PROCEDURE — 96361 HYDRATE IV INFUSION ADD-ON: CPT

## 2020-07-05 PROCEDURE — P9016 RBC LEUKOCYTES REDUCED: HCPCS

## 2020-07-05 PROCEDURE — 99285 EMERGENCY DEPT VISIT HI MDM: CPT

## 2020-07-05 PROCEDURE — 99291 CRITICAL CARE FIRST HOUR: CPT | Performed by: EMERGENCY MEDICINE

## 2020-07-05 PROCEDURE — 36415 COLL VENOUS BLD VENIPUNCTURE: CPT | Performed by: EMERGENCY MEDICINE

## 2020-07-05 PROCEDURE — 80053 COMPREHEN METABOLIC PANEL: CPT | Performed by: EMERGENCY MEDICINE

## 2020-07-05 PROCEDURE — 85025 COMPLETE CBC W/AUTO DIFF WBC: CPT | Performed by: EMERGENCY MEDICINE

## 2020-07-05 PROCEDURE — 36430 TRANSFUSION BLD/BLD COMPNT: CPT

## 2020-07-05 PROCEDURE — 86850 RBC ANTIBODY SCREEN: CPT | Performed by: EMERGENCY MEDICINE

## 2020-07-05 RX ORDER — SODIUM CHLORIDE, SODIUM LACTATE, POTASSIUM CHLORIDE, CALCIUM CHLORIDE 600; 310; 30; 20 MG/100ML; MG/100ML; MG/100ML; MG/100ML
80 INJECTION, SOLUTION INTRAVENOUS CONTINUOUS
Status: DISCONTINUED | OUTPATIENT
Start: 2020-07-06 | End: 2020-07-06

## 2020-07-05 RX ORDER — ACETAMINOPHEN 325 MG/1
650 TABLET ORAL EVERY 6 HOURS PRN
Status: DISCONTINUED | OUTPATIENT
Start: 2020-07-05 | End: 2020-07-11 | Stop reason: HOSPADM

## 2020-07-05 RX ADMIN — SODIUM CHLORIDE 1000 ML: 0.9 INJECTION, SOLUTION INTRAVENOUS at 14:56

## 2020-07-05 NOTE — ED PROVIDER NOTES
History  Chief Complaint   Patient presents with    Rectal Bleeding     patient had hemmorhoid surgery on 23rd  patient has been having episodes of rectal bleeding since  patient has large episode and then near syncopal episode PTA     71-year-old female with prior hemorrhoidectomy 13 days ago  Stating that 6 days ago she presented to [facility] for significant amount of bright red blood and received a blood transfusion of 1 unit and was evaluated by Colorectal surgery and after symptoms stabilized she was discharged  Patient states her hemoglobin was 6 6 and she received a unit of blood and was discharged with hemoglobin 7 3  She states that intermittently since then she has been having bright red blood with her stools  However she states today prior to arrival she had a significant amount of bright red blood and clots with a bowel movement and then she had a near syncopal episode where she fell to the floor  Patient states she did not strike her head and did not lose consciousness  Patient denies any other symptoms or recent illness  Prior to Admission Medications   Prescriptions Last Dose Informant Patient Reported? Taking? Multiple Vitamin (MULTIVITAMIN) tablet  Self Yes No   Sig: Take 1 tablet by mouth daily   ferrous gluconate (FERGON) 324 mg tablet  Self No No   Sig: Take 1 tablet (324 mg total) by mouth 2 (two) times a day before meals   polyethylene glycol (MIRALAX) 17 g packet   Yes No   Sig: Take 17 g by mouth daily as needed      Facility-Administered Medications: None       Past Medical History:   Diagnosis Date    Anemia     iron def    History of transfusion     12/2019    Irritable bowel syndrome        Past Surgical History:   Procedure Laterality Date    COLONOSCOPY      EXPLORATORY LAPAROTOMY      [place] COLONOSCOPY FLX DX W/COLLJ SPEC WHEN PFRMD N/A 8/1/2018    Procedure: COLONOSCOPY;  Surgeon: Mumtaz Garcia MD;  [facility];   Service: Colorectal    AK HEMORRHOIDOPEXY BY STAPLING N/A 6/23/2020    Procedure: Hemorrhoidectomy, internal and external,3 column;  Surgeon: Jim Juarez MD;  [facility];  Service: Colorectal       Family History   Problem Relation Age of Onset    Diabetes Mother     HIV Father     Colon cancer Neg Hx      I have reviewed and agree with the history as documented  E-Cigarette/Vaping    E-Cigarette Use Never User      E-Cigarette/Vaping Substances    Nicotine No     THC No     CBD No     Flavoring No     Other No     Unknown No      Social History     Tobacco Use    Smoking status: Never Smoker    Smokeless tobacco: Never Used   Substance Use Topics    Alcohol use: No    Drug use: No        Review of Systems   Constitutional: Negative  HENT: Negative  Gastrointestinal: Positive for abdominal pain (intermittent abdominal cramping), anal bleeding and blood in stool  Negative for abdominal distention, constipation, diarrhea, nausea, rectal pain and vomiting  Neurological: Negative for seizures, speech difficulty, numbness and headaches  Near-syncope   All other systems reviewed and are negative  Physical Exam  ED Triage Vitals   Temperature Pulse Respirations Blood Pressure SpO2   07/05/20 1641 07/05/20 1426 07/05/20 1426 07/05/20 1426 07/05/20 1505   99 7 °F (37 6 °C) 95 16 105/54 100 %      Temp Source Heart Rate Source Patient Position - Orthostatic VS BP Location FiO2 (%)   07/05/20 1644 07/05/20 1426 07/05/20 1426 07/05/20 1426 --   Oral Monitor Lying Right arm       Pain Score       07/05/20 1924       No Pain             Orthostatic Vital Signs  Vitals:    07/05/20 1837 07/05/20 1912 07/05/20 1924 07/05/20 1930   BP: 96/51 105/53 101/53 110/55   Pulse: 102 100 103 (!) 106   Patient Position - Orthostatic VS:   Lying        Physical Exam   Constitutional: She is oriented to person, place, and time  She appears well-developed and well-nourished  No distress     Appears pale   HENT:   Head: Normocephalic and atraumatic  Right Ear: External ear normal    Left Ear: External ear normal    Nose: Nose normal    Eyes: Pupils are equal, round, and reactive to light  Conjunctivae and EOM are normal  Right eye exhibits no discharge  Left eye exhibits no discharge  No scleral icterus  Neck: Normal range of motion  Neck supple  No JVD present  No tracheal deviation present  Cardiovascular: Regular rhythm, normal heart sounds and intact distal pulses  Tachycardia present  Exam reveals no gallop and no friction rub  No murmur heard  Pulmonary/Chest: Effort normal and breath sounds normal  No stridor  No respiratory distress  She has no wheezes  She has no rales  Abdominal: Soft  Bowel sounds are normal  She exhibits no distension and no mass  There is no tenderness  There is no guarding  Genitourinary:   Genitourinary Comments: External rectal exam shows no active bleeding although dried blood and clots noted    Musculoskeletal: Normal range of motion  She exhibits no edema, tenderness or deformity  Neurological: She is alert and oriented to person, place, and time  No cranial nerve deficit or sensory deficit  She exhibits normal muscle tone  Skin: Skin is warm and dry  No rash noted  She is not diaphoretic  No erythema  No pallor  Psychiatric: She has a normal mood and affect  Her behavior is normal  Judgment and thought content normal    Nursing note and vitals reviewed        ED Medications  Medications   sodium chloride 0 9 % bolus 1,000 mL (0 mL Intravenous Stopped 7/5/20 1656)       Diagnostic Studies  Results Reviewed     Procedure Component Value Units Date/Time    CBC and differential [[id number]]  (Abnormal) Collected:  07/05/20 1456    Lab Status:  Final result Specimen:  Blood from Arm, Right Updated:  07/05/20 1606     WBC 7 60 Thousand/uL      RBC 2 72 Million/uL      Hemoglobin 6 4 g/dL      Hematocrit 21 6 %      MCV 79 fL      MCH 23 5 pg      MCHC 29 6 g/dL      RDW 17 3 % MPV 10 3 fL      Platelets 944 Thousands/uL      nRBC 0 /100 WBCs      Neutrophils Relative 63 %      Immat GRANS % 1 %      Lymphocytes Relative 23 %      Monocytes Relative 11 %      Eosinophils Relative 1 %      Basophils Relative 1 %      Neutrophils Absolute 4 89 Thousands/µL      Immature Grans Absolute 0 06 Thousand/uL      Lymphocytes Absolute 1 73 Thousands/µL      Monocytes Absolute 0 80 Thousand/µL      Eosinophils Absolute 0 08 Thousand/µL      Basophils Absolute 0 04 Thousands/µL     POCT pregnancy, urine [[id number]]  (Normal) Resulted:  07/05/20 1538    Lab Status:  Final result Updated:  07/05/20 1538     EXT PREG TEST UR (Ref: Negative) negative     Control valid    Comprehensive metabolic panel [[id number]]  (Abnormal) Collected:  07/05/20 1456    Lab Status:  Final result Specimen:  Blood from Arm, Right Updated:  07/05/20 1520     Sodium 139 mmol/L      Potassium 3 5 mmol/L      Chloride 104 mmol/L      CO2 26 mmol/L      ANION GAP 9 mmol/L      BUN 13 mg/dL      Creatinine 0 81 mg/dL      Glucose 91 mg/dL      Calcium 7 8 mg/dL      AST 17 U/L      ALT 35 U/L      Alkaline Phosphatase 88 U/L      Total Protein 6 6 g/dL      Albumin 2 9 g/dL      Total Bilirubin 0 40 mg/dL      eGFR 98 ml/min/1 73sq m     Narrative:       [place] guidelines for Chronic Kidney Disease (CKD):     Stage 1 with normal or high GFR (GFR > 90 mL/min/1 73 square meters)    Stage 2 Mild CKD (GFR = 60-89 mL/min/1 73 square meters)    Stage 3A Moderate CKD (GFR = 45-59 mL/min/1 73 square meters)    Stage 3B Moderate CKD (GFR = 30-44 mL/min/1 73 square meters)    Stage 4 Severe CKD (GFR = 15-29 mL/min/1 73 square meters)    Stage 5 End Stage CKD (GFR <15 mL/min/1 73 square meters)  Note: GFR calculation is accurate only with a steady state creatinine                 No orders to display         Procedures  ECG 12 Lead Documentation Only  Date/Time: 7/5/2020 3:23 PM  Performed by: Yefri Yang DO Patsy  Authorized by: Andrey Ye DO     Indications / Diagnosis:  Pre-syncope  ECG reviewed by me, the ED Provider: yes    Patient location:  ED  Previous ECG:     Previous ECG:  Compared to current    Similarity:  No change    Comparison to cardiac monitor: Yes    Interpretation:     Interpretation: normal    Rate:     ECG rate:  100    ECG rate assessment: tachycardic    Rhythm:     Rhythm: sinus tachycardia    Ectopy:     Ectopy: none    QRS:     QRS axis:  Normal    QRS intervals:  Normal  Conduction:     Conduction: normal    ST segments:     ST segments:  Normal  T waves:     T waves: normal            ED Course  ED Course as of Jul 05 2322   Sun Jul 05, 2020   1523 Patient recevied 250ml Normal saline via ems, giving 1L NS here and awaiting CBC for Hgb levels  Blood Pressure(!): 93/48   1625 Transfer order placed to [place] as patient had colorectal surgery there performed on 6/23                                              [facility]  Number of Diagnoses or Management Options  Acute blood loss anemia:   Pre-syncope:   Rectal bleeding:   Diagnosis management comments: Patient found have a hemoglobin of 6 4 given 2 units of PRBCs  Patient case discussed with colorectal surgery  Patient to be transferred to the [facility] for [facility] evaluation          Disposition  Final diagnoses:   Rectal bleeding   Acute blood loss anemia   Pre-syncope     Time reflects when diagnosis was documented in both MDM as applicable and the Disposition within this note     Time User Action Codes Description Comment    7/5/2020  5:00 PM Brickeys Sandy Add [K62 5] Rectal bleeding     7/5/2020 11:21 PM Brickeys White Bird Add [D62] Acute blood loss anemia     7/5/2020 11:22 PM Brickeys White Bird Add [R55] Pre-syncope       ED Disposition     ED Disposition Condition Date/Time Comment    Transfer to Another Facility-In Network  [place] Jul 5, 2020  5:00 PM Abeba Jorge should be transferred out to [facility] Ol[place] SOLIS Documentation      Most Recent Value   Patient Condition  The patient has been stabilized such that within reasonable medical probability, no material deterioration of the patient condition or the condition of the unborn child(kel) is likely to result from the transfer   Reason for Transfer  Level of Care needed not available at this facility   Benefits of Transfer  Specialized equipment and/or services available at the receiving facility (Include comment)________________________ [facility]]   Risks of Transfer  Potential for delay in receiving treatment, Potential deterioration of medical condition, Increased discomfort during transfer, Possible worsening of condition or death during transfer, Loss of IV   Accepting Physician  Radha Lockwood    Sending MD  Dr Leo Vizcarra   Provider Certification  General risk, such as traffic hazards, adverse weather conditions, rough terrain or turbulence, possible failure of equipment (including vehicle or aircraft), or consequences of actions of persons outside the control of the transport personnel, Unanticipated needs of medical equipment and personnel during transport, Risk of worsening condition, The possibility of a transport vehicle being unavailable      RN Documentation      Most [address] [facility] Radha Avalos       Follow-up Information    None         Discharge Medication List as of 7/5/2020  8:50 PM      CONTINUE these medications which have NOT CHANGED    Details   ferrous gluconate (FERGON) 324 mg tablet Take 1 tablet (324 mg total) by mouth 2 (two) times a day before meals, Starting Tue 12/24/2019, No Print      Multiple Vitamin (MULTIVITAMIN) tablet Take 1 tablet by mouth daily, Historical Med      polyethylene glycol (MIRALAX) 17 g packet Take 17 g by mouth daily as needed, Historical Med           No discharge procedures on file     PDMP Review     None           ED Provider  Attending physically available and evaluated Lynford Dakins I managed the patient along with the ED Attending      Electronically Signed by         Fili Hernandez DO  07/05/20 0090

## 2020-07-05 NOTE — ED NOTES
Spoke with Evans at [facility] and he reports that crew is running late  Estimated time of  is 2030       Angel Guardado RN  07/05/20 6195

## 2020-07-05 NOTE — ED ATTENDING ATTESTATION
7/5/2020  Manuel BRAY, saw and evaluated the patient  I have discussed the patient with the resident/non-physician practitioner and agree with the resident's/non-physician practitioner's findings, Plan of Care, and MDM as documented in the resident's/non-physician practitioner's note, except where noted  All available labs and Radiology studies were reviewed  I was present for key portions of any procedure(s) performed by the resident/non-physician practitioner and I was immediately available to provide assistance  At this point I agree with the current assessment done in the Emergency Department  I have conducted an independent evaluation of this patient a history and physical is as follows:    A 50-year-old female with past medical history of hemorrhoids s/p hemorrhoidectomy on 6/23; presents after a near syncopal episode  Patient states she had a bowel movement and afterwards noticed a significant amount of bright red blood per rectum along with large blood clots  Patient states she sat down, and then felt lightheaded  Patient states she did fall to the ground however denies loss of consciousness  Patient denies striking her head  Patient  witnessed the event  Currently patient offers no complaints  Patient denies fever, chills, chest pain, shortness of breath, abdominal pain, nausea, vomiting, diarrhea, peripheral edema and rashes  Patient reports having a similar event last week where she required ED evaluation and ultimate admission for symptomatic anemia  Patient received 1u PRBC  Physical Exam  General Appearance: alert and oriented, nad, non toxic appearing  Skin:  Warm, dry, intact  Pallor noted    HEENT: atraumatic, normocephalic  Neck: Supple, trachea midline  Cardiac: RRR; no murmurs, rub, gallops  Pulmonary: lungs CTAB; no wheezes, rales, rhonchi  Gastrointestinal: abdomen soft, nontender, nondistended; no guarding or rebound tenderness; good bowel sounds, no mass or bruits  Extremities:  no pedal edema, 2+ pulses; no calf tenderness, no clubbing, no cyanosis  Neuro:  no focal motor or sensory deficits, CN 2-12 grossly intact  Psych:  Normal mood and affect, normal judgement and insight    Assessment and Plan:  Near syncope, concern for recurrent symptomatic anemia secondary to bright red blood per rectum  Patient arrived covered in blood, borderline hypotensive and tachycardic  Pt however in no acute distress  On rectal exam completed by resident physician, a large amount of blood clots were removed from the patient's anus and no active bleeding noted  Will check lab work for recurrent anemia  Will plan to transfuse as necessary  Will plan discussed patient's surgeon      ED Course         Critical Care Time  CriticalCare Time  Performed by: Carol Snyder DO  Authorized by: Carol Snyder DO     Critical care provider statement:     Critical care time (minutes):  30    Critical care time was exclusive of:  Separately billable procedures and treating other patients and teaching time    Critical care was necessary to treat or prevent imminent or life-threatening deterioration of the following conditions:  Circulatory failure and shock    Critical care was time spent personally by me on the following activities:  Obtaining history from patient or surrogate, development of treatment plan with patient or surrogate, examination of patient, evaluation of patient's response to treatment, re-evaluation of patient's condition, ordering and review of radiographic studies, ordering and performing treatments and interventions, discussions with consultants, review of old charts and ordering and review of laboratory studies    I assumed direction of critical care for this patient from another provider in my specialty: no

## 2020-07-05 NOTE — EMTALA/ACUTE CARE TRANSFER
[facility] [address]  [address] [id number]  Dept: [phone]      EMTALA TRANSFER CONSENT    NAME Rony Mccormack                                         1990                              MRN [id number]    I have been informed of my rights regarding examination, treatment, and transfer   by Dr Lolly Polanco MD    Benefits:      Risks:        Transfer Request   I acknowledge that my medical condition has been evaluated and explained to me by the emergency department physician or other qualified medical person and/or my attending physician who has recommended and offered to me further medical examination and treatment  I understand the Hospital's obligation with respect to the treatment and stabilization of my emergency medical condition  I nevertheless request to be transferred  I release the Hospital, the doctor, and any other persons caring for me from all responsibility or liability for any injury or ill effects that may result from my transfer and agree to accept all responsibility for the consequences of my choice to transfer, rather than receive stabilizing treatment at the Hospital  I understand that because the transfer is my request, my insurance may not provide reimbursement for the services  The Hospital will assist and direct me and my family in how to make arrangements for transfer, but the hospital is not liable for any fees charged by the transport service  In spite of this understanding, I refuse to consent to further medical examination and treatment which has been offered to me, and request transfer to    I authorize the performance of emergency medical procedures and treatments upon me in both transit and upon arrival at the receiving facility  Additionally, I authorize the release of any and all medical records to the receiving facility and request they be transported with me, if possible      I authorize the performance of emergency medical procedures and treatments upon me in both transit and upon arrival at the receiving facility  Additionally, I authorize the release of any and all medical records to the receiving facility and request they be transported with me, if possible  I understand that the safest mode of transportation during a medical emergency is an ambulance and that the Hospital advocates the use of this mode of transport  Risks of traveling to the receiving facility by car, including absence of medical control, life sustaining equipment, such as oxygen, and medical personnel has been explained to me and I fully understand them  (SHAYAN CORRECT BOX BELOW)  [  ]  I consent to the stated transfer and to be transported by ambulance/helicopter  [  ]  I consent to the stated transfer, but refuse transportation by ambulance and accept full responsibility for my transportation by car  I understand the risks of non-ambulance transfers and I exonerate the Hospital and its staff from any deterioration in my condition that results from this refusal     X___________________________________________    DATE  20  TIME________  Signature of patient or legally responsible individual signing on patient behalf           RELATIONSHIP TO PATIENT_________________________          Provider Certification    NAME Francine Frazier                                        [facility] 1990                              MRN [id number]    A medical screening exam was performed on the above named patient  Based on the examination:    Condition Necessitating Transfer The encounter diagnosis was Rectal bleeding      Patient Condition:      Reason for Transfer:      Transfer Requirements: Facility     · Space available and qualified personnel available for treatment as acknowledged by    · Agreed to accept transfer and to provide appropriate medical treatment as acknowledged by          · Appropriate medical records of the examination and treatment of the patient are provided at the time of transfer   [address] [facility], [address] _______  · Transfer will be performed by qualified personnel from    and appropriate transfer equipment as required, including the use of necessary and appropriate life support measures  Provider Certification: I have examined the patient and explained the following risks and benefits of being transferred/refusing transfer to the patient/family:         Based on these reasonable risks and benefits to the patient and/or the unborn child(kel), and based upon the information available at the time of the patients examination, I certify that the medical benefits reasonably to be expected from the provision of appropriate medical treatments at another medical facility outweigh the increasing risks, if any, to the individuals medical condition, and in the case of labor to the unborn child, from effecting the transfer      X____________________________________________ DATE 07/05/20        TIME_______      ORIGINAL - SEND TO MEDICAL RECORDS   COPY - SEND WITH PATIENT DURING TRANSFER

## 2020-07-05 NOTE — EMTALA/ACUTE CARE TRANSFER
[facility] [address]  [address] [address]  Dept: [phone]      EMTALA TRANSFER CONSENT    NAME Fahad Workman                                         1990                              MRN [id number]    I have been informed of my rights regarding examination, treatment, and transfer   by Dr Illona Buerger, MD    Benefits: Specialized equipment and/or services available at the receiving facility (Include comment)________________________(Colorectal Surgery)    Risks: Potential for delay in receiving treatment, Potential deterioration of medical condition, Increased discomfort during transfer, Possible worsening of condition or death during transfer, Loss of IV      Transfer Request   I acknowledge that my medical condition has been evaluated and explained to me by the emergency department physician or other qualified medical person and/or my attending physician who has recommended and offered to me further medical examination and treatment  I understand the Hospital's obligation with respect to the treatment and stabilization of my emergency medical condition  I nevertheless request to be transferred  I release the Hospital, the doctor, and any other persons caring for me from all responsibility or liability for any injury or ill effects that may result from my transfer and agree to accept all responsibility for the consequences of my choice to transfer, rather than receive stabilizing treatment at the Hospital  I understand that because the transfer is my request, my insurance may not provide reimbursement for the services  The Hospital will assist and direct me and my family in how to make arrangements for transfer, but the hospital is not liable for any fees charged by the transport service    In spite of this understanding, I refuse to consent to further medical examination and treatment which has been offered to me, and request transfer to Accepting Facility Name, [address] [facility] [place] [facility]   I authorize the performance of emergency medical procedures and treatments upon me in both transit and upon arrival at the receiving facility  Additionally, I authorize the release of any and all medical records to the receiving facility and request they be transported with me, if possible  I authorize the performance of emergency medical procedures and treatments upon me in both transit and upon arrival at the receiving facility  Additionally, I authorize the release of any and all medical records to the receiving facility and request they be transported with me, if possible  I understand that the safest mode of transportation during a medical emergency is an ambulance and that the Hospital advocates the use of this mode of transport  Risks of traveling to the receiving facility by car, including absence of medical control, life sustaining equipment, such as oxygen, and medical personnel has been explained to me and I fully understand them  (SHAYAN CORRECT BOX BELOW)  [  ]  I consent to the stated transfer and to be transported by ambulance/helicopter  [  ]  I consent to the stated transfer, but refuse transportation by ambulance and accept full responsibility for my transportation by car  I understand the risks of non-ambulance transfers and I exonerate the Hospital and its staff from any deterioration in my condition that results from this refusal     X___________________________________________    DATE  20  TIME________  Signature of patient or legally responsible individual signing on patient behalf           RELATIONSHIP TO PATIENT_________________________          Provider Certification    NAME Nupur Muse                                         1990                              MRN [id number]    A medical screening exam was performed on the above named patient    Based on the examination:    Condition Necessitating Transfer The encounter diagnosis was Rectal bleeding  Patient Condition: The patient has been stabilized such that within reasonable medical probability, no material deterioration of the patient condition or the condition of the unborn child(kel) is likely to result from the transfer    Reason for Transfer: Level of Care needed not available at this facility    Transfer Requirements: [address]    · Space available and qualified personnel available for treatment as acknowledged by    · Agreed to accept transfer and to provide appropriate medical treatment as acknowledged by       Dr Donnia Osgood  · Appropriate medical records of the examination and treatment of the patient are provided at the time of transfer   [address] [facility], [address] _______  · Transfer will be performed by qualified personnel from    and appropriate transfer equipment as required, including the use of necessary and appropriate life support measures      Provider Certification: I have examined the patient and explained the following risks and benefits of being transferred/refusing transfer to the patient/family:  General risk, such as traffic hazards, adverse weather conditions, rough terrain or turbulence, possible failure of equipment (including vehicle or aircraft), or consequences of actions of persons outside the control of the transport personnel, Unanticipated needs of medical equipment and personnel during transport, Risk of worsening condition, The possibility of a transport vehicle being unavailable      Based on these reasonable risks and benefits to the patient and/or the unborn child(kel), and based upon the information available at the time of the patients examination, I certify that the medical benefits reasonably to be expected from the provision of appropriate medical treatments at another medical facility outweigh the increasing risks, if any, to the individuals medical condition, and in the case of labor to the unborn child, from effecting the transfer      X____________________________________________ DATE 07/05/20        TIME_______      ORIGINAL - SEND TO MEDICAL RECORDS   COPY - SEND WITH PATIENT DURING TRANSFER

## 2020-07-05 NOTE — ED NOTES
Spoke with Trenton Roberts, to set up transportation of pt to [facility]       Joseph Venegas, MEI  07/05/20 0654

## 2020-07-06 ENCOUNTER — ANESTHESIA (OUTPATIENT)
Dept: GASTROENTEROLOGY | Facility: HOSPITAL | Age: 30
DRG: 920 | End: 2020-07-06
Payer: COMMERCIAL

## 2020-07-06 ENCOUNTER — APPOINTMENT (OUTPATIENT)
Dept: GASTROENTEROLOGY | Facility: HOSPITAL | Age: 30
DRG: 920 | End: 2020-07-06
Payer: COMMERCIAL

## 2020-07-06 ENCOUNTER — ANESTHESIA EVENT (OUTPATIENT)
Dept: GASTROENTEROLOGY | Facility: HOSPITAL | Age: 30
DRG: 920 | End: 2020-07-06
Payer: COMMERCIAL

## 2020-07-06 LAB
ABO GROUP BLD BPU: NORMAL
ABO GROUP BLD BPU: NORMAL
ATRIAL RATE: 100 BPM
BPU ID: NORMAL
BPU ID: NORMAL
CROSSMATCH: NORMAL
CROSSMATCH: NORMAL
HCT VFR BLD AUTO: 28.9 % (ref 34.8–46.1)
HCT VFR BLD AUTO: 29.4 % (ref 34.8–46.1)
HGB BLD-MCNC: 9.3 G/DL (ref 11.5–15.4)
HGB BLD-MCNC: 9.3 G/DL (ref 11.5–15.4)
P AXIS: 54 DEGREES
PR INTERVAL: 124 MS
QRS AXIS: 78 DEGREES
QRSD INTERVAL: 70 MS
QT INTERVAL: 346 MS
QTC INTERVAL: 446 MS
SARS-COV-2 RNA RESP QL NAA+PROBE: NEGATIVE
T WAVE AXIS: 48 DEGREES
UNIT DISPENSE STATUS: NORMAL
UNIT DISPENSE STATUS: NORMAL
UNIT PRODUCT CODE: NORMAL
UNIT PRODUCT CODE: NORMAL
UNIT RH: NORMAL
UNIT RH: NORMAL
VENTRICULAR RATE: 100 BPM

## 2020-07-06 PROCEDURE — 93010 ELECTROCARDIOGRAM REPORT: CPT | Performed by: INTERNAL MEDICINE

## 2020-07-06 PROCEDURE — 0W3P8ZZ CONTROL BLEEDING IN GASTROINTESTINAL TRACT, VIA NATURAL OR ARTIFICIAL OPENING ENDOSCOPIC: ICD-10-PCS | Performed by: COLON & RECTAL SURGERY

## 2020-07-06 PROCEDURE — 85014 HEMATOCRIT: CPT | Performed by: SURGERY

## 2020-07-06 PROCEDURE — 85018 HEMOGLOBIN: CPT | Performed by: SURGERY

## 2020-07-06 PROCEDURE — 45334 SIGMOIDOSCOPY FOR BLEEDING: CPT | Performed by: COLON & RECTAL SURGERY

## 2020-07-06 PROCEDURE — 99223 1ST HOSP IP/OBS HIGH 75: CPT | Performed by: COLON & RECTAL SURGERY

## 2020-07-06 RX ORDER — SODIUM CHLORIDE 9 MG/ML
INJECTION, SOLUTION INTRAVENOUS CONTINUOUS PRN
Status: DISCONTINUED | OUTPATIENT
Start: 2020-07-06 | End: 2020-07-06 | Stop reason: SURG

## 2020-07-06 RX ORDER — DOCUSATE SODIUM 100 MG/1
100 CAPSULE, LIQUID FILLED ORAL 2 TIMES DAILY
Status: DISCONTINUED | OUTPATIENT
Start: 2020-07-06 | End: 2020-07-11 | Stop reason: HOSPADM

## 2020-07-06 RX ORDER — PROPOFOL 10 MG/ML
INJECTION, EMULSION INTRAVENOUS AS NEEDED
Status: DISCONTINUED | OUTPATIENT
Start: 2020-07-06 | End: 2020-07-06 | Stop reason: SURG

## 2020-07-06 RX ORDER — LIDOCAINE HYDROCHLORIDE 10 MG/ML
INJECTION, SOLUTION EPIDURAL; INFILTRATION; INTRACAUDAL; PERINEURAL AS NEEDED
Status: DISCONTINUED | OUTPATIENT
Start: 2020-07-06 | End: 2020-07-06 | Stop reason: SURG

## 2020-07-06 RX ADMIN — LIDOCAINE HYDROCHLORIDE 50 MG: 10 INJECTION, SOLUTION EPIDURAL; INFILTRATION; INTRACAUDAL; PERINEURAL at 10:33

## 2020-07-06 RX ADMIN — DOCUSATE SODIUM 100 MG: 100 CAPSULE, LIQUID FILLED ORAL at 16:50

## 2020-07-06 RX ADMIN — SODIUM CHLORIDE: 0.9 INJECTION, SOLUTION INTRAVENOUS at 10:30

## 2020-07-06 RX ADMIN — PROPOFOL 100 MG: 10 INJECTION, EMULSION INTRAVENOUS at 10:33

## 2020-07-06 RX ADMIN — SODIUM CHLORIDE, SODIUM LACTATE, POTASSIUM CHLORIDE, AND CALCIUM CHLORIDE 80 ML/HR: .6; .31; .03; .02 INJECTION, SOLUTION INTRAVENOUS at 00:44

## 2020-07-06 RX ADMIN — ACETAMINOPHEN 650 MG: 325 TABLET ORAL at 17:00

## 2020-07-06 RX ADMIN — PROPOFOL 40 MG: 10 INJECTION, EMULSION INTRAVENOUS at 10:39

## 2020-07-06 RX ADMIN — PROPOFOL 100 MG: 10 INJECTION, EMULSION INTRAVENOUS at 10:35

## 2020-07-06 RX ADMIN — PROPOFOL 30 MG: 10 INJECTION, EMULSION INTRAVENOUS at 10:43

## 2020-07-06 NOTE — H&P
H&P Exam - General Surgery   Martha Chilel 26 y o  female MRN: [id number]  Unit/Bed#: [facility] 923-01 Encounter: [id number]    Assessment/Plan     Assessment:  Patient with recent history of hemorrhoidectomy as well as recent readmission for rectal bleed, comes in again for the same problem  Plan: We will obtain CBC 2 hours after completion of transfusion  Will also obtain order CBC in the a m  Rebekah [place] Patient can have clear liquid diet on 2 midnight and NPO After  We will schedule for a flexible sigmoidoscopy tomorrow  History of Present Illness     HPI:  Martha Chilel is a 26 y o  female who presents with rectal bleeding status post hemorrhoidectomy back in June 23rd  She subsequently came back on the 30th of June due to rectal bleeding  At the time she was transfuse a unit of  Blood and her hemoglobin stayed stable and the bleeding stopped  She was subsequently discharged  Patient was doing well until today where she again started feeling a little bit dizzy and lightheaded  She subsequently had bloody bowel movements  She said that was ralph blood  She then went to the emergency department in the [facility]  Hemoglobin at that side was 6 4  She was started 2 units of blood transfusion and was transferred here for further care  On the floor, she was hemodynamically stable  Not tachycardic and hypotensive, vital signs were within normal limits  Review of Systems   Constitutional: Negative  HENT: Negative  Respiratory: Negative  Cardiovascular: Negative  Historical Information   Past Medical History:   Diagnosis Date    Anemia     iron def    History of transfusion     12/2019    Irritable bowel syndrome      Past Surgical History:   Procedure Laterality Date    COLONOSCOPY      EXPLORATORY LAPAROTOMY      MN COLONOSCOPY FLX DX W/COLLJ SPEC WHEN PFRMD N/A 8/1/2018    Procedure: COLONOSCOPY;  Surgeon: Trevor Partida MD;  [facility]: [facility];   Service: Colorectal    MN HEMORRHOIDOPEXY BY STAPLING N/A 6/23/2020    Procedure: Hemorrhoidectomy, internal and external,3 column;  Surgeon: Rosibel Corrales MD;  Location: [facility];  Service: Colorectal     Social History   Social History     Substance and Sexual Activity   Alcohol Use No     Social History     Substance and Sexual Activity   Drug Use No     Social History     Tobacco Use   Smoking Status Never Smoker   Smokeless Tobacco Never Used     E-Cigarette/Vaping    E-Cigarette Use Never User      E-Cigarette/Vaping Substances    Nicotine No     THC No     CBD No     Flavoring No     Other No     Unknown No      Family History: non-contributory    Meds/Allergies   all medications and allergies reviewed  Allergies   Allergen Reactions    Latex      Condoms  Burning sensation wears normal underwear and can eat banans        Objective   First Vitals:   Blood Pressure: 112/56 (07/05/20 2101)  Pulse: 95 (07/05/20 2101)  Temperature: 99 4 °F (37 4 °C) (07/05/20 2101)  Temp Source: Oral (07/05/20 2101)  Respirations: 16 (07/05/20 2101)  Height: 5' (152 4 cm) (07/05/20 2101)  SpO2: 97 % (07/05/20 2101)    Current Vitals:   Blood Pressure: 111/61 (07/05/20 2159)  Pulse: 103 (07/05/20 2159)  Temperature: 98 7 °F (37 1 °C) (07/05/20 2159)  Temp Source: Oral (07/05/20 2159)  Respirations: 16 (07/05/20 2159)  Height: 5' (152 4 cm) (07/05/20 2101)  SpO2: 98 % (07/05/20 2159)    No intake or output data in the 24 hours ending 07/05/20 2246    Invasive Devices     Peripheral Intravenous Line            Peripheral IV 06/29/20 Forearm 6 days    Peripheral IV 06/30/20 Left Forearm 5 days    Peripheral IV 07/05/20 Right;Left Forearm less than 1 day                Physical Exam  General:  Alert, awake, not in acute distress  Pulmonary:  Clear to auscultation bilaterally  Not tachypneic  Cardiology:  Regular rate and rhythm  No tachycardia    Abdomen:  Soft, nondistended, nontender to palpation  Rectal exam:  Incisions were clean dry and intact  No active bleeding currently seen  Patient refused LEI  Lab Results: I have personally reviewed pertinent lab results  Imaging: I have personally reviewed pertinent reports  EKG, Pathology, and Other Studies: I have personally reviewed pertinent reports  Code Status: Level 1 - Full Code  Advance Directive and Living Will:      Power of :    POLST:      Counseling / Coordination of Care  Total floor / unit time spent today 30 minutes  Greater than 50% of total time was spent with the patient and / or family counseling and / or coordination of care  A description of the counseling / coordination of care: spoke to her about the plan for flex sig tomorrow

## 2020-07-06 NOTE — PROGRESS NOTES
Post Procedure Check    Patient is doing well after her colonoscopy  She is in her room sitting comfortably  I discussed with her the next steps which include taking Colace and Sitz Baths  She does not feel comfortable leaving the hospital today as she does not want this to occur again and have to come back  She wants to be able to have a bowel movement prior to leaving      Plan:    Discussion with Colorectal Attending regarding time of d/c  Likely will d/c early AM 7/7    Colace and Sitz bath's while inpatient and at home

## 2020-07-06 NOTE — ED NOTES
RN called to P9 to give report on patient as she is being transferred by [facility] at this time  No answer at the number [phone] and no answer to P9 when being transferred through [address]  at this time  RN will try to call again  Patient leaving [facility] ER at this time        Gloria Pires, MEI  07/05/20 203

## 2020-07-06 NOTE — ANESTHESIA POSTPROCEDURE EVALUATION
Post-Op Assessment Note    CV Status:  Stable  Pain Score: 0    Pain management: adequate     Mental Status:  Sleepy   Hydration Status:  Stable and euvolemic   Airway Patency:  Patent   Post Op Vitals Reviewed: Yes      Staff: CRNA           BP   97/54   Temp      Pulse 86   Resp 19   SpO2 99

## 2020-07-06 NOTE — PROGRESS NOTES
Progress Note - Colorectal   Delorise Julia 26 y o  female MRN: [id number]  Unit/Bed#: [facility] 923-01 Encounter: [id number]      Objective:  Patient doing well this morning  No further rectal bleeding  Denies rectal pain  Hemoglobin of 6 4 on admission after being transfused 2 units her hemoglobin at 1:30 this morning was 9 3  Patient denies nausea, no vomiting  She has urinated x1  Patient is ambulating well on her own  Patient to have a flexible sick, exam under sedation this morning  1 UA    Blood pressure 111/61, pulse 103, temperature 98 7 °F (37 1 °C), temperature source Oral, resp  rate 16, height 5' (1 524 m), weight 46 9 kg (103 lb 6 3 oz), SpO2 98 %  ,Body mass index is 20 19 kg/m²  Intake/Output Summary (Last 24 hours) at 7/6/2020 0515  Last data filed at 7/5/2020 2155  Gross per 24 hour   Intake --   Output 0 ml   Net 0 ml       Invasive Devices     Peripheral Intravenous Line            Peripheral IV 06/29/20 Forearm 7 days    Peripheral IV 06/30/20 Left Forearm 6 days    Peripheral IV 07/05/20 Right;Left Forearm 1 day                Physical Exam:   Abdomen:  Soft, flat, nontender, nondistended, active bowel sounds  Rectal:  No apparent blood was seen  Extremities:  No edema, no calf tenderness    Lab, Imaging and other studies:  Hemoglobin pending this morning  VTE Pharmacologic Prophylaxis: Reason for no pharmacologic prophylaxis Rectal bleed  VTE Mechanical Prophylaxis: sequential compression device    Assessment:  Post hemorrhoidectomy bleed    Plan:  1  Exam under sedation, flexible sigmoidoscopy this morning  2  Check hemoglobin at 6:00 a m  3  Possible discharge to home after flex sig  4  Continue Colace 100 mg b i d

## 2020-07-06 NOTE — ANESTHESIA PREPROCEDURE EVALUATION
Review of Systems/Medical History  Patient summary reviewed  Chart reviewed  No history of anesthetic complications     Cardiovascular  Negative cardio ROS Exercise tolerance (METS): >4,     Pulmonary  Negative pulmonary ROS        GI/Hepatic    GI bleeding (bleeding hemorrhoids; s/p 2 units pRBC Hgb 6 4-->9 3) , active,   Comment: IBS     Negative  ROS        Endo/Other  Negative endo/other ROS      GYN       Hematology  Anemia chronic blood loss anemia and iron deficiency anemia,     Musculoskeletal  Negative musculoskeletal ROS        Neurology  Negative neurology ROS      Psychology       EKG 6/29/2020:  Sinus tachycardia  Nonspecific ST and T wave abnormality  Otherwise normal ECG    Lab Results   Component Value Date    WBC 7 60 07/05/2020    HGB 9 3 (L) 07/06/2020    HCT 28 9 (L) 07/06/2020    MCV 79 (L) 07/05/2020     (H) 07/05/2020     Lab Results   Component Value Date    SODIUM 139 07/05/2020    K 3 5 07/05/2020     07/05/2020    CO2 26 07/05/2020    BUN 13 07/05/2020    CREATININE 0 81 07/05/2020    GLUC 91 07/05/2020    CALCIUM 7 8 (L) 07/05/2020     Lab Results   Component Value Date    INR 1 05 06/29/2020    INR 1 02 05/27/2015    PROTIME 13 7 06/29/2020    PROTIME 13 2 05/27/2015           Physical Exam    Airway       Dental       Cardiovascular  Comment: Negative ROS,     Pulmonary      Other Findings        Anesthesia Plan  ASA Score- 2     Anesthesia Type- IV sedation with anesthesia with ASA Monitors  Additional Monitors:   Airway Plan:         Plan Factors-    Induction- intravenous      Postoperative Plan-     Informed Consent-

## 2020-07-06 NOTE — UTILIZATION REVIEW
Initial Clinical Review    Admission: Date/Time/Statement: Admission Orders (From admission, onward)     Ordered        07/05/20 2218  Place in Observation  Once                   Orders Placed This Encounter   Procedures    Place in Observation     Standing Status:   Standing     Number of Occurrences:   1     Order Specific Question:   Admitting Physician     Answer:   Truman Sellers [[id number]]     Order Specific Question:   Level of Care     Answer:   Med Surg [[id number]]     ED Arrival Information     Patient not seen in ED  -- tx from [facility] ED                     Chief complaint:      Assessment/Plan:   25 y/o female who initially presented to [facility] ED with rectal bleeding  Pt with h/o hemorrhoidectomy June 23rd and returned to ED with rectal bleeding on the 30th  At that time she was given a unit of PRBC's Hgb remained stable, bleeding stopped and was d/c'd home  Pt states she was doing well until today when she started to feel dizzy and lightheaded then had bloody BM's  Hgb was 6 4, given 2 units of PRBC's and transferred to [facility] for further eval and tx  Admit observation to M/S under color-rectal surgery service  -- plan: obtain CBC q2h after completion of transfusion  CBC in AM   Clear liquid diet, npo after MN       7/6 -- no further rectal bleeding  Hgb 9 3 this AM   Flex sig this AM -- result: No active bleeding seen to suggest high volume GI bleed  Mild ooze at hemorrhoidectomy site  Clips placed for compression with no bleeding at completion of case  Colace and sitz bath ordered    Hopes to have a BM prior to being d/c'd home           ED Triage Vitals   Temperature Pulse Respirations Blood Pressure SpO2   07/05/20 2101 07/05/20 2101 07/05/20 2101 07/05/20 2101 07/05/20 2101   99 4 °F (37 4 °C) 95 16 112/56 97 %      Temp Source Heart Rate Source Patient Position - Orthostatic VS BP Location FiO2 (%)   07/05/20 2101 -- 07/05/20 2101 07/05/20 2101 -- Oral  Lying Right arm       Pain Score       07/05/20 2100       No Pain        Wt Readings from Last 1 Encounters:   07/05/20 46 9 kg (103 lb 6 3 oz)     Additional Vital Signs:   Date/Time  Temp  Pulse  Resp  BP  MAP (mmHg)  SpO2  O2 Device  Patient Position - Orthostatic VS   07/06/20 06:57:49  99 °F (37 2 °C)  85  16  96/51  66  100 %  --  --   07/05/20 21:59:59  98 7 °F (37 1 °C)  103  16  111/61  78  98 %  --  --   07/05/20 21:01:14  99 4 °F (37 4 °C)  95  16  112/56  75  97 %  None (Room air)  Lying   07/05/20 2100  --  --  --  --  --  --  None (Room air)  --       Pertinent Labs/Diagnostic Test Results:   EKG -- sinus tach    Results from last 7 days   Lab Units 07/05/20  2330   SARS-COV-2  Negative     Results from last 7 days   Lab Units 07/06/20  0529 07/06/20  0121 07/05/20  1456 06/30/20 0447 06/29/20  2239   WBC Thousand/uL  --   --  7 60 10 21* 10 38*   HEMOGLOBIN g/dL 9 3* 9 3* 6 4* 7 3* 6 6*   HEMATOCRIT % 28 9* 29 4* 21 6* 24 5* 22 6*   PLATELETS Thousands/uL  --   --  540* 199 236   NEUTROS ABS Thousands/µL  --   --  4 89  --  7 58     Results from last 7 days   Lab Units 07/05/20  1456 06/30/20 0447 06/29/20  2239   SODIUM mmol/L 139 139 138   POTASSIUM mmol/L 3 5 3 8 3 1*   CHLORIDE mmol/L 104 111* 106   CO2 mmol/L 26 22 25   ANION GAP mmol/L 9 6 7   BUN mg/dL 13 6 11   CREATININE mg/dL 0 81 0 52* 0 75   EGFR ml/min/1 73sq m 98 129 107   CALCIUM mg/dL 7 8* 8 0* 8 1*     Results from last 7 days   Lab Units 07/05/20  1456 06/29/20  2239   AST U/L 17 25   ALT U/L 35 78   ALK PHOS U/L 88 115   TOTAL PROTEIN g/dL 6 6 6 5   ALBUMIN g/dL 2 9* 2 8*   TOTAL BILIRUBIN mg/dL 0 40 0 51     Results from last 7 days   Lab Units 07/05/20  1456 06/30/20  0447 06/29/20  2239   GLUCOSE RANDOM mg/dL 91 92 102     Results from last 7 days   Lab Units 06/29/20  2239   PROTIME seconds 13 7   INR  1 05   PTT seconds 30         Past Medical History:   Diagnosis Date    Anemia     iron def    History of transfusion     12/2019    Irritable bowel syndrome      Present on Admission:   Bleeding hemorrhoids      Admitting Diagnosis: rectal bleeding, recent post op, syncopal episode  Age/Sex: 26 y o  female  Admission Orders:   Continuous IV Infusions:  lactated ringers 80 mL/hr Intravenous Continuous     PRN Meds:  acetaminophen 650 mg Oral Q6H PRN         Network Utilization Review Department  [email or web address]  ATTENTION: Please call with any questions or concerns to [phone] and carefully listen to the prompts so that you are directed to the right person  All voicemails are confidential   Ty Limb all requests for admission clinical reviews, approved or denied determinations and any other requests to dedicated fax number below belonging to the campus where the patient is receiving treatment   List of dedicated fax numbers for the Facilities:  [address] DENIALS (Administrative/Medical Necessity) [phone]   [address] (Maternity/NICU/Pediatrics) [phone]   Dakota Bishop [phone]   Elias Pettit [phone]   Yi Heady [phone]   Hettie Lips [phone]   [address] [place] [address] [phone]   [facility]  [phone]   [address] [facility] [address]  24[address] [facility] [address] [facility] [phone]

## 2020-07-06 NOTE — SOCIAL WORK
Met with pt and discussed role of CM  Pt lives with her  and dtr in a 2-story home with 3 steps at entrance  Pt is independent in ADLs  No DME or prior C  Preference for pharmacy is [facility] on [address]--pt interested in having scripts filled at [facility] upon d/c  No MH/D&A tx hx  NO PCP identified for pt--info-link card given  No POA  Main contact: - Teresita Manriquez ([phone])  CM reviewed d/c planning process including the following: identifying help at home, patient preference for d/c planning needs, Discharge Lounge, [facility] Meds to Bed program, availability of treatment team to discuss questions or concerns patient and/or family may have regarding understanding medications and recognizing signs and symptoms once discharged  CM also encouraged patient to follow up with all recommended appointments after discharge  Patient advised of importance for patient and family to participate in managing patients medical well being  Patient/caregiver received discharge checklist  Content reviewed  Patient/caregiver encouraged to participate in discharge plan of care prior to discharge home

## 2020-07-07 LAB
ERYTHROCYTE [DISTWIDTH] IN BLOOD BY AUTOMATED COUNT: 16.4 % (ref 11.6–15.1)
HCT VFR BLD AUTO: 29.1 % (ref 34.8–46.1)
HCT VFR BLD AUTO: 30 % (ref 34.8–46.1)
HGB BLD-MCNC: 9.2 G/DL (ref 11.5–15.4)
HGB BLD-MCNC: 9.3 G/DL (ref 11.5–15.4)
MCH RBC QN AUTO: 25.6 PG (ref 26.8–34.3)
MCHC RBC AUTO-ENTMCNC: 31.6 G/DL (ref 31.4–37.4)
MCV RBC AUTO: 81 FL (ref 82–98)
PLATELET # BLD AUTO: 588 THOUSANDS/UL (ref 149–390)
PMV BLD AUTO: 9.7 FL (ref 8.9–12.7)
RBC # BLD AUTO: 3.59 MILLION/UL (ref 3.81–5.12)
WBC # BLD AUTO: 8.68 THOUSAND/UL (ref 4.31–10.16)

## 2020-07-07 PROCEDURE — 85018 HEMOGLOBIN: CPT | Performed by: SURGERY

## 2020-07-07 PROCEDURE — 99233 SBSQ HOSP IP/OBS HIGH 50: CPT | Performed by: COLON & RECTAL SURGERY

## 2020-07-07 PROCEDURE — 85027 COMPLETE CBC AUTOMATED: CPT | Performed by: PHYSICIAN ASSISTANT

## 2020-07-07 PROCEDURE — 85014 HEMATOCRIT: CPT | Performed by: SURGERY

## 2020-07-07 RX ORDER — SODIUM CHLORIDE 9 MG/ML
100 INJECTION, SOLUTION INTRAVENOUS CONTINUOUS
Status: DISCONTINUED | OUTPATIENT
Start: 2020-07-08 | End: 2020-07-08

## 2020-07-07 RX ADMIN — DOCUSATE SODIUM 100 MG: 100 CAPSULE, LIQUID FILLED ORAL at 07:54

## 2020-07-07 NOTE — PROGRESS NOTES
Progress Note - Colorectal   Martha Chilel 26 y o  female MRN: [id number]  Unit/Bed#: [facility] 923-01 Encounter: [id number]      Objective:  Patient doing well this morning  She has had 2 bowel movements since the flexible sigmoidoscopy  Small amount of blood past with the last bowel movement  No active rectal bleeding  No lightheadedness, no shortness of breath  Tolerating a regular diet  Taking Colace 100 mg b i d  And using the Sitz baths  Voiding well on her own  750 + 4 UA's  2 BM's    Blood pressure 95/63, pulse 89, temperature 98 6 °F (37 °C), resp  rate 18, height 5' (1 524 m), weight 46 7 kg (103 lb), SpO2 100 %  ,Body mass index is 20 12 kg/m²  Intake/Output Summary (Last 24 hours) at 7/7/2020 0459  Last data filed at 7/6/2020 1801  Gross per 24 hour   Intake 1726 67 ml   Output 750 ml   Net 976 67 ml       Invasive Devices     Peripheral Intravenous Line            Peripheral IV 06/29/20 Forearm 8 days    Peripheral IV 06/30/20 Left Forearm 7 days    Peripheral IV 07/05/20 Right;Left Forearm 2 days                Physical Exam:   Abdomen:  Is soft, nondistended, nontender  Rectal no blood seen  Extremities:  No calf tenderness    Lab, Imaging and other studies:  Hemoglobin pending this morning  VTE Pharmacologic Prophylaxis:  Rectal bleed  VTE Mechanical Prophylaxis: sequential compression device    Assessment:  Post hemorrhoidectomy bleed  Day 1 clipping hemorrhoidal sites suture line    Plan:  1  Check hemoglobin this morning  2  Continue Sitz baths  3  Continue Colace of the stool softener  4   Home today

## 2020-07-07 NOTE — PROGRESS NOTES
Patient called RN to room and stated that she has been experiencing frequent bowel movements that are containing only blood and blood clots and no stool  Toilet had dark red blood noticeable  Patient states that she is very anxious and has a constant feeling of having to have a bowel movement  Dr Tania Nunes with [facility] made aware

## 2020-07-07 NOTE — UTILIZATION REVIEW
Continued Stay Review    Date: 7/7/20                        Current Patient Class: OBS Current Level of Care: MS    HPI:29 y o  female initially admitted on 7/5 with rectal bleeding post hemorrhoidectomy done 2 weeks ago  She had a previous post op admission for rectal bleeding before this one and needed a transfusion and was d/c home  This time she again has dizziness, lightheadedness, and rectal bleeding with hgb of 6 4 and transfused with 2 u PRBCs at the [facility] and was transferred to [facility]  Hgb to 9 3  She had flex sig on 7/6 which showed no active bleeding  There is mild ooze at hemorrhoidectomy site  Clips were placed for compression  Assessment/Plan:   Pt has had 2 BMs since the flex sigmoidoscopy yesterday  There was a small amount of bleed with last BM, no active bleeding, no lightheadedness  Taking Colace BID and using sitz baths  Plan is for d/c today pending no further bleeding  7/7 pt did have a loose maroon and red BM mixed with stool  Not incontinent or tachycardic or hypotensive  Rectal area clean and not bloody and no active blood seen  Checking 1500 CBC before making decision about d/c to home  This afternoon the patient had additional frequent bloody, no stool, BMs  Pt is anxious and she is constantly feeling like she is going to have a BM  No drop in hgb    Pertinent Labs/Diagnostic Results:     Results from last 7 days   Lab Units 07/05/20  2330   SARS-COV-2  Negative      Ref   Range 7/7/2020 07:51 7/7/2020 14:52   WBC Latest Ref Range: 4 31 - 10 16 Thousand/uL  8 68   Red Blood Cell Count Latest Ref Range: 3 81 - 5 12 Million/uL  3 59 (L)   Hemoglobin Latest Ref Range: 11 5 - 15 4 g/dL 9 3 (L) 9 2 (L)   HCT Latest Ref Range: 34 8 - 46 1 % 30 0 (L) 29 1 (L)   MCV Latest Ref Range: 82 - 98 fL  81 (L)   MCH Latest Ref Range: 26 8 - 34 3 pg  25 6 (L)   MCHC Latest Ref Range: 31 4 - 37 4 g/dL  31 6   RDW Latest Ref Range: 11 6 - 15 1 %  16 4 (H)   Platelet Count Latest Ref Range: 149 - 390 Thousands/uL  588 (H)   MPV Latest Ref Range: 8 9 - 12 7 fL  9 7     Results from last 7 days   Lab Units 07/07/20  0751 07/06/20  0529 07/06/20  0121 07/05/20  1456   WBC Thousand/uL  --   --   --  7 60   HEMOGLOBIN g/dL 9 3* 9 3* 9 3* 6 4*   HEMATOCRIT % 30 0* 28 9* 29 4* 21 6*   PLATELETS Thousands/uL  --   --   --  540*   NEUTROS ABS Thousands/µL  --   --   --  4 89         Results from last 7 days   Lab Units 07/05/20  1456   SODIUM mmol/L 139   POTASSIUM mmol/L 3 5   CHLORIDE mmol/L 104   CO2 mmol/L 26   ANION GAP mmol/L 9   BUN mg/dL 13   CREATININE mg/dL 0 81   EGFR ml/min/1 73sq m 98   CALCIUM mg/dL 7 8*     Results from last 7 days   Lab Units 07/05/20  1456   AST U/L 17   ALT U/L 35   ALK PHOS U/L 88   TOTAL PROTEIN g/dL 6 6   ALBUMIN g/dL 2 9*   TOTAL BILIRUBIN mg/dL 0 40     Results from last 7 days   Lab Units 07/05/20  1456   GLUCOSE RANDOM mg/dL 91     Vital Signs:   07/07/20 07:24:53  98 3 °F (36 8 °C)  78  16  102/64  77  100 %  --  --   07/06/20 22:48:50  98 6 °F (37 °C)  89  18  95/63  74  100 %  --  --   07/06/20 2045  --  --  --  --  --  --  None (Room air)  --   07/06/20 15:16:39  99 1 °F (37 3 °C)  89  18  95/65  75  99 %  --  --   07/06/20 1107  --  84  18  96/50  --  100 %  None (Room air)  --   07/06/20 1052  --  89  18  89/48Abnormal   --  100 %  None (Room air)  --   07/06/20 1020  98 7 °F (37 1 °C)  108Abnormal   18  123/65  --  100 %  None (Room air)  --   07/06/20 06:57:49  99 °F (37 2 °C)  85  16  96/51  66  100 %  --  --   07/05/20 21:59:59  98 7 °F (37 1 °C)  103  16  111/61  78  98 %  --  --   07/05/20 21:01:14  99 4 °F (37 4 °C)  95  16  112/56  75  97 %  None (Room air)  Lying     Medications:   Scheduled Medications:    Medications:  docusate sodium 100 mg Oral BID     Continuous IV Infusions:     PRN Meds:    acetaminophen 650 mg Oral Q6H PRN x1 7/6     Discharge Plan: home     Network Utilization Review Department  [email or web address]  ATTENTION: Please call with any questions or concerns to [phone] and carefully listen to the prompts so that you are directed to the right person  All voicemails are confidential   Angelica Garcia all requests for admission clinical reviews, approved or denied determinations and any other requests to dedicated fax number below belonging to the campus where the patient is receiving treatment   List of dedicated fax numbers for the Facilities:  [address] DENIALS (Administrative/Medical Necessity) [phone]   [address] (Maternity/NICU/Pediatrics) [phone]   Conda Sas [phone]   Jamas Piggs [phone]   Elena Dress [phone]   Levell Pleasure [phone]   [address] [place] [address] [phone]   [facility]  [phone]   [address] [facility] [address] [facility]  2401 [facility] 1000 [address] [facility] [phone]

## 2020-07-07 NOTE — DISCHARGE SUMMARY
Discharge Summary - Mallory Nuñez 26 y o  female MRN: [id number]    Unit/Bed#: [facility] 923-01 Encounter: [id number]    Admission Date:   Admission Orders (From admission, onward)     Ordered        07/05/20 2218  Place in Observation  Once                     Admitting Diagnosis: rectal bleeding, recent post op, syncopal episode    HPI:  Mallory Nuñez is a 26 y o  female who presents with rectal bleeding status post hemorrhoidectomy back in June 23rd  She subsequently came back on the 30th of June due to rectal bleeding  At the time she was transfuse a unit of  Blood and her hemoglobin stayed stable and the bleeding stopped  She was subsequently discharged       Patient was doing well until 7/5 she astarted feeling a little bit dizzy and lightheaded  She subsequently had bloody bowel movements  She said that was ralph blood  She then went to the emergency department in the [facility]  Hemoglobin at that side was 6 4  She was started 2 units of blood transfusion and was transferred here for further care  Procedures Performed: No orders of the defined types were placed in this encounter  Summary of Hospital Course: Patient admitted with symptomatic anemia due to rectal bleeding on 7/5  She was started on 2 units of blood at [facility]  Serial CBCs were obtained  Hemoglobin was stable at 9 3  A colonoscopy was done on 7/6  No active bleeding to suggest high volume GI bleed was found  Clips placed for compression with no bleeding at completion of case  However the patient continued have signs of bleeding with downtrending hemoglobin requiring transfusion  She was then taken for EUA where the bleeding area was oversewn on 7/8  Postoperatively she had nausea and vomiting  She required transfusion again on 7/10 due to downtrending hemoglobin without further signs of bleeding   She subsequently did well and was discharged home with stool softeners on 7/11         Significant Findings, Care, Treatment and Services Provided:    Colonoscopy 7/6:  All observed locations appeared normal  Suture lies seen in the anal canal and distal rectum  Small oozing at suture line and clip placed  No bleeding at completion of procedure  EUA 7/8: Oozing points were over sewn  Friable granulation tissue noted  Complications: None    Discharge Diagnosis: Post-hemroidectomy bleed    Resolved Problems  Date Reviewed: 6/23/2020    None          Condition at Discharge: good         Discharge instructions/Information to patient and family:   See after visit summary for information provided to patient and family  Provisions for Follow-Up Care:  See after visit summary for information related to follow-up care and any pertinent home health orders  PCP: No primary care provider on file  Disposition: Home    Planned Readmission: No      Discharge Statement   I spent 25 minutes discharging the patient  This time was spent on the day of discharge  I had direct contact with the patient on the day of discharge  Additional documentation is required if more than 30 minutes were spent on discharge  Discharge Medications:  See after visit summary for reconciled discharge medications provided to patient and family

## 2020-07-07 NOTE — PLAN OF CARE
Problem: DISCHARGE PLANNING  Goal: Discharge to home or other facility with appropriate resources  Description  INTERVENTIONS:  - Identify barriers to discharge w/patient and caregiver  - Arrange for needed discharge resources and transportation as appropriate  - Identify discharge learning needs (meds, wound care, etc )  - Arrange for interpretive services to assist at discharge as needed  - Refer to Case Management Department for coordinating discharge planning if the patient needs post-hospital services based on physician/advanced practitioner order or complex needs related to functional status, cognitive ability, or social support system  Outcome: Progressing     Problem: Potential for Falls  Goal: Patient will remain free of falls  Description  INTERVENTIONS:  - Assess patient frequently for physical needs  -  Identify cognitive and physical deficits and behaviors that affect risk of falls    -  Leary fall precautions as indicated by assessment   - Educate patient/family on patient safety including physical limitations  - Instruct patient to call for assistance with activity based on assessment  - Modify environment to reduce risk of injury  - Consider OT/PT consult to assist with strengthening/mobility  Outcome: Progressing

## 2020-07-07 NOTE — PROGRESS NOTES
Patient had a loose bowel movement of maroon and red blood mixed with some stool  Patient not incontinent  Patient not tachycardic, not hypotensive  Rectal area clean and not blood or active bleed seen  Will order 3 pm CBC   If still stable, patient would feel better prior to going home

## 2020-07-08 ENCOUNTER — ANESTHESIA EVENT (OUTPATIENT)
Dept: PERIOP | Facility: HOSPITAL | Age: 30
DRG: 920 | End: 2020-07-08
Payer: COMMERCIAL

## 2020-07-08 ENCOUNTER — ANESTHESIA (OUTPATIENT)
Dept: PERIOP | Facility: HOSPITAL | Age: 30
DRG: 920 | End: 2020-07-08
Payer: COMMERCIAL

## 2020-07-08 LAB
ERYTHROCYTE [DISTWIDTH] IN BLOOD BY AUTOMATED COUNT: 16.6 % (ref 11.6–15.1)
HCT VFR BLD AUTO: 23 % (ref 34.8–46.1)
HCT VFR BLD AUTO: 23.9 % (ref 34.8–46.1)
HGB BLD-MCNC: 7 G/DL (ref 11.5–15.4)
HGB BLD-MCNC: 7.5 G/DL (ref 11.5–15.4)
MCH RBC QN AUTO: 24.9 PG (ref 26.8–34.3)
MCHC RBC AUTO-ENTMCNC: 30.4 G/DL (ref 31.4–37.4)
MCV RBC AUTO: 82 FL (ref 82–98)
PLATELET # BLD AUTO: 490 THOUSANDS/UL (ref 149–390)
PMV BLD AUTO: 9.7 FL (ref 8.9–12.7)
RBC # BLD AUTO: 2.81 MILLION/UL (ref 3.81–5.12)
WBC # BLD AUTO: 6.77 THOUSAND/UL (ref 4.31–10.16)

## 2020-07-08 PROCEDURE — C1765 ADHESION BARRIER: HCPCS | Performed by: COLON & RECTAL SURGERY

## 2020-07-08 PROCEDURE — 85014 HEMATOCRIT: CPT | Performed by: SURGERY

## 2020-07-08 PROCEDURE — 0W3P8ZZ CONTROL BLEEDING IN GASTROINTESTINAL TRACT, VIA NATURAL OR ARTIFICIAL OPENING ENDOSCOPIC: ICD-10-PCS | Performed by: COLON & RECTAL SURGERY

## 2020-07-08 PROCEDURE — 85018 HEMOGLOBIN: CPT | Performed by: SURGERY

## 2020-07-08 PROCEDURE — 46614 ANOSCOPY CONTROL BLEEDING: CPT | Performed by: COLON & RECTAL SURGERY

## 2020-07-08 PROCEDURE — 85027 COMPLETE CBC AUTOMATED: CPT | Performed by: SURGERY

## 2020-07-08 PROCEDURE — C9290 INJ, BUPIVACAINE LIPOSOME: HCPCS | Performed by: COLON & RECTAL SURGERY

## 2020-07-08 PROCEDURE — 45990 SURG DX EXAM ANORECTAL: CPT | Performed by: COLON & RECTAL SURGERY

## 2020-07-08 RX ORDER — FENTANYL CITRATE 50 UG/ML
INJECTION, SOLUTION INTRAMUSCULAR; INTRAVENOUS AS NEEDED
Status: DISCONTINUED | OUTPATIENT
Start: 2020-07-08 | End: 2020-07-08 | Stop reason: SURG

## 2020-07-08 RX ORDER — MIDAZOLAM HYDROCHLORIDE 2 MG/2ML
INJECTION, SOLUTION INTRAMUSCULAR; INTRAVENOUS AS NEEDED
Status: DISCONTINUED | OUTPATIENT
Start: 2020-07-08 | End: 2020-07-08 | Stop reason: SURG

## 2020-07-08 RX ORDER — OXYCODONE HYDROCHLORIDE 5 MG/1
5 TABLET ORAL EVERY 4 HOURS PRN
Status: DISCONTINUED | OUTPATIENT
Start: 2020-07-08 | End: 2020-07-11 | Stop reason: HOSPADM

## 2020-07-08 RX ORDER — LIDOCAINE HYDROCHLORIDE 10 MG/ML
INJECTION, SOLUTION EPIDURAL; INFILTRATION; INTRACAUDAL; PERINEURAL AS NEEDED
Status: DISCONTINUED | OUTPATIENT
Start: 2020-07-08 | End: 2020-07-08 | Stop reason: SURG

## 2020-07-08 RX ORDER — DEXAMETHASONE SODIUM PHOSPHATE 10 MG/ML
INJECTION, SOLUTION INTRAMUSCULAR; INTRAVENOUS AS NEEDED
Status: DISCONTINUED | OUTPATIENT
Start: 2020-07-08 | End: 2020-07-08 | Stop reason: SURG

## 2020-07-08 RX ORDER — SUCCINYLCHOLINE/SOD CL,ISO/PF 100 MG/5ML
SYRINGE (ML) INTRAVENOUS AS NEEDED
Status: DISCONTINUED | OUTPATIENT
Start: 2020-07-08 | End: 2020-07-08 | Stop reason: SURG

## 2020-07-08 RX ORDER — PROPOFOL 10 MG/ML
INJECTION, EMULSION INTRAVENOUS AS NEEDED
Status: DISCONTINUED | OUTPATIENT
Start: 2020-07-08 | End: 2020-07-08 | Stop reason: SURG

## 2020-07-08 RX ORDER — OXYCODONE HYDROCHLORIDE 10 MG/1
10 TABLET ORAL EVERY 4 HOURS PRN
Status: DISCONTINUED | OUTPATIENT
Start: 2020-07-08 | End: 2020-07-11 | Stop reason: HOSPADM

## 2020-07-08 RX ORDER — ONDANSETRON 2 MG/ML
INJECTION INTRAMUSCULAR; INTRAVENOUS AS NEEDED
Status: DISCONTINUED | OUTPATIENT
Start: 2020-07-08 | End: 2020-07-08 | Stop reason: SURG

## 2020-07-08 RX ORDER — BUPIVACAINE HYDROCHLORIDE AND EPINEPHRINE 2.5; 5 MG/ML; UG/ML
INJECTION, SOLUTION EPIDURAL; INFILTRATION; INTRACAUDAL; PERINEURAL AS NEEDED
Status: DISCONTINUED | OUTPATIENT
Start: 2020-07-08 | End: 2020-07-08 | Stop reason: HOSPADM

## 2020-07-08 RX ORDER — HYDROMORPHONE HCL/PF 1 MG/ML
SYRINGE (ML) INJECTION AS NEEDED
Status: DISCONTINUED | OUTPATIENT
Start: 2020-07-08 | End: 2020-07-08 | Stop reason: SURG

## 2020-07-08 RX ORDER — FENTANYL CITRATE/PF 50 MCG/ML
25 SYRINGE (ML) INJECTION
Status: DISCONTINUED | OUTPATIENT
Start: 2020-07-08 | End: 2020-07-08 | Stop reason: HOSPADM

## 2020-07-08 RX ORDER — MAGNESIUM HYDROXIDE 1200 MG/15ML
LIQUID ORAL AS NEEDED
Status: DISCONTINUED | OUTPATIENT
Start: 2020-07-08 | End: 2020-07-08 | Stop reason: HOSPADM

## 2020-07-08 RX ORDER — HYDROMORPHONE HCL/PF 1 MG/ML
0.2 SYRINGE (ML) INJECTION
Status: DISCONTINUED | OUTPATIENT
Start: 2020-07-08 | End: 2020-07-08 | Stop reason: HOSPADM

## 2020-07-08 RX ORDER — ONDANSETRON 2 MG/ML
4 INJECTION INTRAMUSCULAR; INTRAVENOUS ONCE AS NEEDED
Status: DISCONTINUED | OUTPATIENT
Start: 2020-07-08 | End: 2020-07-08 | Stop reason: HOSPADM

## 2020-07-08 RX ADMIN — ACETAMINOPHEN 650 MG: 325 TABLET ORAL at 20:21

## 2020-07-08 RX ADMIN — LIDOCAINE HYDROCHLORIDE 50 MG: 10 INJECTION, SOLUTION EPIDURAL; INFILTRATION; INTRACAUDAL; PERINEURAL at 09:41

## 2020-07-08 RX ADMIN — HYDROMORPHONE HYDROCHLORIDE 0.2 MG: 1 INJECTION, SOLUTION INTRAMUSCULAR; INTRAVENOUS; SUBCUTANEOUS at 11:26

## 2020-07-08 RX ADMIN — OXYCODONE HYDROCHLORIDE 10 MG: 10 TABLET ORAL at 17:19

## 2020-07-08 RX ADMIN — SODIUM CHLORIDE 100 ML/HR: 0.9 INJECTION, SOLUTION INTRAVENOUS at 11:22

## 2020-07-08 RX ADMIN — FENTANYL CITRATE 25 MCG: 50 INJECTION, SOLUTION INTRAMUSCULAR; INTRAVENOUS at 11:07

## 2020-07-08 RX ADMIN — FENTANYL CITRATE 50 MCG: 50 INJECTION, SOLUTION INTRAMUSCULAR; INTRAVENOUS at 09:41

## 2020-07-08 RX ADMIN — Medication 100 MG: at 09:42

## 2020-07-08 RX ADMIN — HYDROMORPHONE HYDROCHLORIDE 0.5 MG: 1 INJECTION, SOLUTION INTRAMUSCULAR; INTRAVENOUS; SUBCUTANEOUS at 09:57

## 2020-07-08 RX ADMIN — FENTANYL CITRATE 25 MCG: 50 INJECTION, SOLUTION INTRAMUSCULAR; INTRAVENOUS at 11:02

## 2020-07-08 RX ADMIN — FENTANYL CITRATE 50 MCG: 50 INJECTION, SOLUTION INTRAMUSCULAR; INTRAVENOUS at 09:53

## 2020-07-08 RX ADMIN — PROPOFOL 50 MG: 10 INJECTION, EMULSION INTRAVENOUS at 09:54

## 2020-07-08 RX ADMIN — SODIUM CHLORIDE 100 ML/HR: 0.9 INJECTION, SOLUTION INTRAVENOUS at 00:08

## 2020-07-08 RX ADMIN — HYDROMORPHONE HYDROCHLORIDE 0.2 MG: 1 INJECTION, SOLUTION INTRAMUSCULAR; INTRAVENOUS; SUBCUTANEOUS at 11:21

## 2020-07-08 RX ADMIN — OXYCODONE HYDROCHLORIDE 10 MG: 10 TABLET ORAL at 23:36

## 2020-07-08 RX ADMIN — FENTANYL CITRATE 25 MCG: 50 INJECTION, SOLUTION INTRAMUSCULAR; INTRAVENOUS at 10:54

## 2020-07-08 RX ADMIN — PROPOFOL 200 MG: 10 INJECTION, EMULSION INTRAVENOUS at 09:41

## 2020-07-08 RX ADMIN — ONDANSETRON 4 MG: 2 INJECTION INTRAMUSCULAR; INTRAVENOUS at 09:57

## 2020-07-08 RX ADMIN — MIDAZOLAM 2 MG: 1 INJECTION INTRAMUSCULAR; INTRAVENOUS at 09:37

## 2020-07-08 RX ADMIN — OXYCODONE HYDROCHLORIDE 5 MG: 5 TABLET ORAL at 13:10

## 2020-07-08 RX ADMIN — FENTANYL CITRATE 25 MCG: 50 INJECTION, SOLUTION INTRAMUSCULAR; INTRAVENOUS at 10:46

## 2020-07-08 RX ADMIN — DOCUSATE SODIUM 100 MG: 100 CAPSULE, LIQUID FILLED ORAL at 13:10

## 2020-07-08 RX ADMIN — DEXAMETHASONE SODIUM PHOSPHATE 10 MG: 10 INJECTION, SOLUTION INTRAMUSCULAR; INTRAVENOUS at 09:57

## 2020-07-08 NOTE — NURSING NOTE
[facility] notified of hgb of 7 this am from 9 2 yesterday  [facility], will continue to monitor

## 2020-07-08 NOTE — PROGRESS NOTES
Progress Note - Colorectal Surgery   Antonio Kapadia 26 y o  female MRN: [id number]  Unit/Bed#: [facility] 923-01 Encounter: [id number]    Assessment:  Patient is a 26 y o  female who presented with rectal bleeding after hemorrhoidectomy on 06/23 now status post colonoscopy on 07/06 still with persistent bloody bowel movements so plan is for exam under anesthesia this morning  Hemoglobin has been stable:  9 2 from 9 3    Plan:  NPO  Normal saline at 100  Continue to follow hemoglobin  Postop check after procedure and possible discharge later on today    Subjective/Objective     Subjective:     Events as noted above  Denies blood BM overnight  Objective:    Blood pressure 94/60, pulse 93, temperature 98 6 °F (37 °C), resp  rate 19, height 5' (1 524 m), weight 46 7 kg (103 lb), SpO2 98 %  ,Body mass index is 20 12 kg/m²  Intake/Output Summary (Last 24 hours) at 7/8/2020 0553  Last data filed at 7/8/2020 0319  Gross per 24 hour   Intake 360 ml   Output 250 ml   Net 110 ml       Invasive Devices     Peripheral Intravenous Line            Peripheral IV 07/05/20 Left Forearm 3 days                Physical Exam:   Gen:  Well-developed, well-nourished female in NAD  CV: RRR  Lungs: Normal respiratory effort   Abd: soft, nontender, nondistended, well healed surgical incision across abd  Neuro: AxO x3      Results from last 7 days   Lab Units 07/07/20  1452 07/07/20  0751 07/06/20  0529  07/05/20  1456   WBC Thousand/uL 8 68  --   --   --  7 60   HEMOGLOBIN g/dL 9 2* 9 3* 9 3*   < > 6 4*   HEMATOCRIT % 29 1* 30 0* 28 9*   < > 21 6*   PLATELETS Thousands/uL 588*  --   --   --  540*    < > = values in this interval not displayed       Results from last 7 days   Lab Units 07/05/20  1456   POTASSIUM mmol/L 3 5   CHLORIDE mmol/L 104   CO2 mmol/L 26   BUN mg/dL 13   CREATININE mg/dL 0 81   CALCIUM mg/dL 7 8*

## 2020-07-08 NOTE — ANESTHESIA POSTPROCEDURE EVALUATION
Post-Op Assessment Note    CV Status:  Stable  Pain Score: 0    Pain management: adequate     Mental Status:  Alert and awake   Hydration Status:  Euvolemic   PONV Controlled:  Controlled   Airway Patency:  Patent   Post Op Vitals Reviewed: Yes      Staff: CRNA           /52 (07/08/20 1036)    Temp (!) 97 3 °F (36 3 °C) (07/08/20 1036)    Pulse (!) 144 (07/08/20 1036)   Resp (!) 11 (07/08/20 1036)    SpO2 100 % (07/08/20 1036)

## 2020-07-08 NOTE — UTILIZATION REVIEW
Continued Stay Review    Date: 7/8/20                        Current Patient Class: OBS - CHANED TO IP  Current Level of Care: MS    OBSERVATION ON 7/5/20 - CHANGED TO INPATIENT ON 7/8/20 @ 1732    Inpatient Admission Orders (From admission, onward)     Ordered        07/08/20 1732  Inpatient Admission  Once                   Orders Placed This Encounter   Procedures    Inpatient Admission     Standing Status:   Standing     Number of Occurrences:   1     Order Specific Question:   Admitting Physician     Answer:   Sekou Justice [[id number]]     Order Specific Question:   Level of Care     Answer:   Med Surg [16]     Order Specific Question:   Estimated length of stay     Answer:   More than 2 Midnights     Order Specific Question:   Certification     Answer:   I certify that inpatient services are medically necessary for this patient for a duration of greater than two midnights  See H&P and MD Progress Notes for additional information about the patient's course of treatment  HPI:29 y o  female initially admitted on 7/5 with ongoing rectal bleeding post hemorrhoidectomy  Had flex sig on 7/6 and still has persistent bloody BMs  Assessment/Plan:   No bloody BMs overnight  Pt will have exam under anesthesia  Hemoglobin dropped to 7 0 from yesterday 9 2  She had transfusion on 7/5  Continues with IV fluids, NPO       ++++++++++++++++++++++++++  7/8 Operative Note    Preop Diagnosis:  Bleeding hemorrhoids [K64 9]     Post-Op Diagnosis Codes: * Bleeding hemorrhoids [K64 9]     Procedure(s) (LRB):  EXAM UNDER ANESTHESIA (EUA), diagnostic anoscopy with control of bleeding (N/A)      Anesthesia Type:   General     Operative Findings: To hemorrhoidectomy sites examined  Both were healing nicely without signs of infection however had bleeding granulation tissue   ++++++++++++++++++++++++++++    7/8 pt will not be d/c today as she is having rectal pain postop  Has voided    Hgb at 1800 today with PRN analgesia        Pertinent Labs/Diagnostic Results:   Results from last 7 days   Lab Units 07/05/20  2330   SARS-COV-2  Negative     Results from last 7 days   Lab Units 07/08/20  0708 07/07/20  1452 07/07/20  0751 07/06/20  0529 07/06/20  0121 07/05/20  1456   WBC Thousand/uL 6 77 8 68  --   --   --  7 60   HEMOGLOBIN g/dL 7 0* 9 2* 9 3* 9 3* 9 3* 6 4*   HEMATOCRIT % 23 0* 29 1* 30 0* 28 9* 29 4* 21 6*   PLATELETS Thousands/uL 490* 588*  --   --   --  540*   NEUTROS ABS Thousands/µL  --   --   --   --   --  4 89     Results from last 7 days   Lab Units 07/05/20  1456   SODIUM mmol/L 139   POTASSIUM mmol/L 3 5   CHLORIDE mmol/L 104   CO2 mmol/L 26   ANION GAP mmol/L 9   BUN mg/dL 13   CREATININE mg/dL 0 81   EGFR ml/min/1 73sq m 98   CALCIUM mg/dL 7 8*     Results from last 7 days   Lab Units 07/05/20  1456   AST U/L 17   ALT U/L 35   ALK PHOS U/L 88   TOTAL PROTEIN g/dL 6 6   ALBUMIN g/dL 2 9*   TOTAL BILIRUBIN mg/dL 0 40     Results from last 7 days   Lab Units 07/05/20  1456   GLUCOSE RANDOM mg/dL 91     Vital Signs:   07/08/20 07:20:57  98 3 °F (36 8 °C)  118Abnormal   12  110/71  84  100 %  --   07/08/20 03:19:38  98 6 °F (37 °C)  93  19  94/60  71  98 %  --   07/07/20 22:49:30  --  97  --  --  --  98 %  --   07/07/20 2249  --  --  --  --  --  --  None (Room air)   07/07/20 22:29:58  98 6 °F (37 °C)  --  14  98/60  73  --  --   07/07/20 15:03:59  98 9 °F (37 2 °C)  111Abnormal   18  101/65  77  96 %  --   07/07/20 0800  --  --  --  --  --  --  None (Room air)   07/07/20 07:24:53  98 3 °F (36 8 °C)  78  16  102/64  77  100 %  --   07/06/20 22:48:50  98 6 °F (37 °C)  89  18  95/63  74  100 %  --   07/06/20 2045  --  --  --  --  --  --  None (Room air)   07/06/20 15:16:39  99 1 °F (37 3 °C)  89  18  95/65  75  99 %  --   07/06/20 1107  --  84  18  96/50  --  100 %  None (Room air)   07/06/20 1052  --  89  18  89/48Abnormal   --  100 %  None (Room air)   07/06/20 1020  98 7 °F (37 1 °C)  108Abnormal   18  123/65  -- 100 %  None (Room air)   07/06/20 06:57:49  99 °F (37 2 °C)  85  16  96/51  66  100 %  --     Medications:   Scheduled Medications:    Medications:  docusate sodium 100 mg Oral BID     Continuous IV Infusions:    sodium chloride 100 mL/hr Intravenous Continuous     PRN Meds:    Hydromorphone 0 2 mg  IV  PRN  X 2 7/8 now d/c    Oxycodone IR  5 mg  Oral  PRN x1 7/8   acetaminophen 650 mg Oral Q6H PRN      Discharge Plan: home     Network Utilization Review Department  [email or web address]  ATTENTION: Please call with any questions or concerns to [phone] and carefully listen to the prompts so that you are directed to the right person  All voicemails are confidential   Rena Irene all requests for admission clinical reviews, approved or denied determinations and any other requests to dedicated fax number below belonging to the campus where the patient is receiving treatment   List of dedicated fax numbers for the Facilities:  [address] [place] [address] DENIALS (Administrative/Medical Necessity) [phone]   [address] (Maternity/NICU/Pediatrics) [phone]   [facility] [phone]   Cherylene Freud [phone]   Sidney Yen [phone]   [address] [facility]  [phone]   [address] [place] [address] [phone]   [facility]  [phone]   [address] [facility]  [address] [address] [address] [facility] [phone]

## 2020-07-08 NOTE — NURSING NOTE
Diminished urine output overnight, 150 mls of charted urine from 7a till now  White surgery notified, awaiting response  Will continue to monitor and will relay to oncoming day shift RN

## 2020-07-08 NOTE — OP NOTE
OPERATIVE REPORT  PATIENT NAME: Tarun Camara    :  1990  MRN: [id number]  Pt Location:  OR ROOM 09    SURGERY DATE: 2020    Surgeon(s) and Role:     * Timothy Cardenas MD - Primary     * Ravi De Leon MD - Assisting    Preop Diagnosis:  Bleeding hemorrhoids [K64 9]    Post-Op Diagnosis Codes: * Bleeding hemorrhoids [K64 9]    Procedure(s) (LRB):  EXAM UNDER ANESTHESIA (EUA), diagnostic anoscopy with control of bleeding (N/A)    Specimen(s):  * No specimens in log *    Estimated Blood Loss:   Minimal    Drains:  * No LDAs found *    Anesthesia Type:   General    Operative Indications:  Bleeding hemorrhoids [K64 9]      Operative Findings: To hemorrhoidectomy sites examined  Both were healing nicely without signs of infection however had bleeding granulation tissue  Complications:   None    Procedure and Technique:  After preoperative identification in the preoperative holding area, the patient was taken the operating room placed in the supine position  Following introduction of general anesthesia the patient was in place in the prone jackknife position with buttocks taped apart  Patient was prepped and draped in usual sterile fashion  Timeout was undertaken and procedure begun  Examination was begun  External anal sutures were healing quite well  Internally in the right posterior lateral position there is some separation of the sutures with granulation tissue underneath this  This looked overall quite healthy but was losing persistently from the granulation component  This persisted despite direct pressure  In the left lateral position these areas were better healed but there is a small area in the mid portion of the anal canal suture line but did have some oozing  In order to decrease bleeding, multiple sutures were placed in the right posterior lateral and left lateral positions to over sew the bleeding points    There is still some small mucosal level bleeding that was controlled with Bovie cautery  The area was inspected for bleeding after approximately 5 minutes and no further bleeding was seen  As such local field block using 0 25% bupivacaine with Exparel was placed  Gelfoam was placed within the anal canal   Patient was awaken from anesthesia and transferred to recovery room stable condition     I was present for the entire procedure    Patient Disposition:  PACU     SIGNATURE: Oneida Echevarria MD  DATE: July 8, 2020  TIME: 10:10 AM

## 2020-07-08 NOTE — ANESTHESIA PREPROCEDURE EVALUATION
Review of Systems/Medical History  Patient summary reviewed  Chart reviewed  No history of anesthetic complications     Cardiovascular  Negative cardio ROS Exercise tolerance (METS): >4,     Pulmonary  Negative pulmonary ROS        GI/Hepatic    GI bleeding (bleeding hemorrhoids; s/p 2 units pRBC Hgb 6 4-->9 3) , active, No liver disease ,   Comment: IBS     Negative  ROS        Endo/Other  Negative endo/other ROS      GYN       Hematology  Anemia chronic blood loss anemia and iron deficiency anemia,     Musculoskeletal  Negative musculoskeletal ROS        Neurology  Negative neurology ROS      Psychology       EKG 6/29/2020:  Sinus tachycardia  Nonspecific ST and T wave abnormality  Otherwise normal ECG    Lab Results   Component Value Date    WBC 6 77 07/08/2020    HGB 7 0 (L) 07/08/2020    HCT 23 0 (L) 07/08/2020    MCV 82 07/08/2020     (H) 07/08/2020     Lab Results   Component Value Date    SODIUM 139 07/05/2020    K 3 5 07/05/2020     07/05/2020    CO2 26 07/05/2020    BUN 13 07/05/2020    CREATININE 0 81 07/05/2020    GLUC 91 07/05/2020    CALCIUM 7 8 (L) 07/05/2020     Lab Results   Component Value Date    INR 1 05 06/29/2020    INR 1 02 05/27/2015    PROTIME 13 7 06/29/2020    PROTIME 13 2 05/27/2015           Physical Exam    Airway    Mallampati score: II  TM Distance: >3 FB  Neck ROM: full     Dental   No notable dental hx     Cardiovascular  Comment: Negative ROS,     Pulmonary      Other Findings        Anesthesia Plan  ASA Score- 2     Anesthesia Type- general with ASA Monitors  Additional Monitors:   Airway Plan: ETT  Comment: Patient seen and examined  History reviewed  Patient to be done under general anesthesia with ETT and routine monitors  Risks discussed with the patient  Consent obtained        Plan Factors-    Induction- intravenous  Postoperative Plan- Plan for postoperative opioid use   Planned trial extubation    Informed Consent- Anesthetic plan and risks discussed with patient  I personally reviewed this patient with the CRNA  Discussed and agreed on the Anesthesia Plan with the YURI Rai

## 2020-07-08 NOTE — QUICK NOTE
Postoperative evaluation:    Patient is status post EUA with anoscopy  Some small mucosal bleeding was seen and controlled with electrocautery  Patient is having some rectal pain postoperatively  Denies nausea and vomiting  Has voided since surgery w/o issue  Pt resting comfortably in no acute distress      Will f/u 6pm Hbg  Possible dc tomorrow  Pain control  Am labs      Starr Smith MD

## 2020-07-08 NOTE — PLAN OF CARE
Problem: DISCHARGE PLANNING  Goal: Discharge to home or other facility with appropriate resources  Description  INTERVENTIONS:  - Identify barriers to discharge w/patient and caregiver  - Arrange for needed discharge resources and transportation as appropriate  - Identify discharge learning needs (meds, wound care, etc )  - Arrange for interpretive services to assist at discharge as needed  - Refer to Case Management Department for coordinating discharge planning if the patient needs post-hospital services based on physician/advanced practitioner order or complex needs related to functional status, cognitive ability, or social support system  Outcome: Progressing     Problem: Potential for Falls  Goal: Patient will remain free of falls  Description  INTERVENTIONS:  - Assess patient frequently for physical needs  -  Identify cognitive and physical deficits and behaviors that affect risk of falls    -  [place] fall precautions as indicated by assessment   - Educate patient/family on patient safety including physical limitations  - Instruct patient to call for assistance with activity based on assessment  - Modify environment to reduce risk of injury  - Consider OT/PT consult to assist with strengthening/mobility  Outcome: Progressing     Problem: HEMATOLOGIC - ADULT  Goal: Maintains hematologic stability  Description  INTERVENTIONS  - Assess for signs and symptoms of bleeding or hemorrhage  - Monitor labs  - Administer supportive blood products/factors as ordered and appropriate  Outcome: Progressing

## 2020-07-08 NOTE — NURSING NOTE
Pt complaining of "bubblying" to her stomach, ever since she had large bloody bm this afternoon  Bowel sounds hyperactive on assessment  Abdomen nondistended and soft  Pt denies nausea or vomiting  Vital signs taken  Pt also states she gets dizzy and heart races when she stands  Denies chestpain andpalpations  White sx made aware, no new orders at this time, en route to pt's bedside  Pt educated on importance of calling for help prior to getting out of bed  Will continue to monitor

## 2020-07-09 LAB
ANION GAP SERPL CALCULATED.3IONS-SCNC: 4 MMOL/L (ref 4–13)
BUN SERPL-MCNC: 7 MG/DL (ref 5–25)
CALCIUM SERPL-MCNC: 8.8 MG/DL (ref 8.3–10.1)
CHLORIDE SERPL-SCNC: 102 MMOL/L (ref 100–108)
CO2 SERPL-SCNC: 30 MMOL/L (ref 21–32)
CREAT SERPL-MCNC: 0.68 MG/DL (ref 0.6–1.3)
ERYTHROCYTE [DISTWIDTH] IN BLOOD BY AUTOMATED COUNT: 16.7 % (ref 11.6–15.1)
ERYTHROCYTE [DISTWIDTH] IN BLOOD BY AUTOMATED COUNT: 16.8 % (ref 11.6–15.1)
GFR SERPL CREATININE-BSD FRML MDRD: 118 ML/MIN/1.73SQ M
GLUCOSE SERPL-MCNC: 101 MG/DL (ref 65–140)
HCT VFR BLD AUTO: 22.1 % (ref 34.8–46.1)
HCT VFR BLD AUTO: 23.4 % (ref 34.8–46.1)
HGB BLD-MCNC: 6.9 G/DL (ref 11.5–15.4)
HGB BLD-MCNC: 7.1 G/DL (ref 11.5–15.4)
MCH RBC QN AUTO: 24.7 PG (ref 26.8–34.3)
MCH RBC QN AUTO: 24.8 PG (ref 26.8–34.3)
MCHC RBC AUTO-ENTMCNC: 30.3 G/DL (ref 31.4–37.4)
MCHC RBC AUTO-ENTMCNC: 31.2 G/DL (ref 31.4–37.4)
MCV RBC AUTO: 79 FL (ref 82–98)
MCV RBC AUTO: 82 FL (ref 82–98)
PLATELET # BLD AUTO: 594 THOUSANDS/UL (ref 149–390)
PLATELET # BLD AUTO: 624 THOUSANDS/UL (ref 149–390)
PMV BLD AUTO: 9.2 FL (ref 8.9–12.7)
PMV BLD AUTO: 9.6 FL (ref 8.9–12.7)
POTASSIUM SERPL-SCNC: 3.3 MMOL/L (ref 3.5–5.3)
RBC # BLD AUTO: 2.79 MILLION/UL (ref 3.81–5.12)
RBC # BLD AUTO: 2.86 MILLION/UL (ref 3.81–5.12)
SODIUM SERPL-SCNC: 136 MMOL/L (ref 136–145)
WBC # BLD AUTO: 10.07 THOUSAND/UL (ref 4.31–10.16)
WBC # BLD AUTO: 12.35 THOUSAND/UL (ref 4.31–10.16)

## 2020-07-09 PROCEDURE — 85027 COMPLETE CBC AUTOMATED: CPT | Performed by: SURGERY

## 2020-07-09 PROCEDURE — 80048 BASIC METABOLIC PNL TOTAL CA: CPT | Performed by: SURGERY

## 2020-07-09 RX ORDER — POTASSIUM CHLORIDE 20 MEQ/1
40 TABLET, EXTENDED RELEASE ORAL ONCE
Status: COMPLETED | OUTPATIENT
Start: 2020-07-09 | End: 2020-07-09

## 2020-07-09 RX ORDER — ONDANSETRON 4 MG/1
4 TABLET, ORALLY DISINTEGRATING ORAL EVERY 6 HOURS PRN
Status: DISCONTINUED | OUTPATIENT
Start: 2020-07-09 | End: 2020-07-11 | Stop reason: HOSPADM

## 2020-07-09 RX ADMIN — OXYCODONE HYDROCHLORIDE 10 MG: 10 TABLET ORAL at 08:37

## 2020-07-09 RX ADMIN — OXYCODONE HYDROCHLORIDE 10 MG: 10 TABLET ORAL at 22:35

## 2020-07-09 RX ADMIN — POTASSIUM CHLORIDE 40 MEQ: 1500 TABLET, EXTENDED RELEASE ORAL at 08:43

## 2020-07-09 RX ADMIN — OXYCODONE HYDROCHLORIDE 10 MG: 10 TABLET ORAL at 13:45

## 2020-07-09 RX ADMIN — DOCUSATE SODIUM 100 MG: 100 CAPSULE, LIQUID FILLED ORAL at 17:28

## 2020-07-09 RX ADMIN — DOCUSATE SODIUM 100 MG: 100 CAPSULE, LIQUID FILLED ORAL at 08:37

## 2020-07-09 RX ADMIN — SODIUM CHLORIDE 500 ML: 0.9 INJECTION, SOLUTION INTRAVENOUS at 15:23

## 2020-07-09 RX ADMIN — ONDANSETRON 4 MG: 4 TABLET, ORALLY DISINTEGRATING ORAL at 14:35

## 2020-07-09 NOTE — PROGRESS NOTES
Progress Note - General Surgery   Dion Morrissey 26 y o  female MRN: [id number]  Unit/Bed#: [facility] 923-01 Encounter: [id number]    Assessment:  S/p EUA and oversewing of the suture line for hemorrhoidectomy 7/8 due to decreasing Hgb even after clipping with colonoscopy:    Plan:  F/u CBC today and BM  If no bloody BM and Hgb stable, likely can be discharged  Cont regular diet  Pain management    Subjective/Objective     Subjective: Doing well  No abdominal pain  Had some CLD yesterday  Anal pain improved  NO BM yet  Objective:     Blood pressure 108/68, pulse 92, temperature 99 1 °F (37 3 °C), resp  rate 20, height 5' (1 524 m), weight 46 7 kg (103 lb), SpO2 100 %  ,Body mass index is 20 12 kg/m²  Intake/Output Summary (Last 24 hours) at 7/9/2020 0557  Last data filed at 7/9/2020 0300  Gross per 24 hour   Intake 400 ml   Output 1400 ml   Net -1000 ml       Invasive Devices     Peripheral Intravenous Line            Peripheral IV 07/05/20 Left Forearm 4 days                Physical Exam:   Gen: Alert, awake, oriented, NAD  Cards: RRR, not tachy  Pulm: no respiratory distress  Abd: soft, non-tender/distended  Rectal exam: Dressings still in place, mildly stained with slight serosang fluid  No ralph blood  Lab, Imaging and other studies:I have personally reviewed pertinent lab results      VTE Pharmacologic Prophylaxis: Sequential compression device (Venodyne)   VTE Mechanical Prophylaxis: sequential compression device

## 2020-07-09 NOTE — PROGRESS NOTES
Patient vomited medium amount of undigested food, zofran administered  Notified Surgical team   Will continue to monitor    Vitals:    07/09/20 1433   BP: 105/67   Pulse: (!) 107   Resp:    Temp: 99 3 °F (37 4 °C)   SpO2: 100%

## 2020-07-09 NOTE — PLAN OF CARE
Problem: DISCHARGE PLANNING  Goal: Discharge to home or other facility with appropriate resources  Description  INTERVENTIONS:  - Identify barriers to discharge w/patient and caregiver  - Arrange for needed discharge resources and transportation as appropriate  - Identify discharge learning needs (meds, wound care, etc )  - Arrange for interpretive services to assist at discharge as needed  - Refer to Case Management Department for coordinating discharge planning if the patient needs post-hospital services based on physician/advanced practitioner order or complex needs related to functional status, cognitive ability, or social support system  Outcome: Progressing     Problem: Potential for Falls  Goal: Patient will remain free of falls  Description  INTERVENTIONS:  - Assess patient frequently for physical needs  -  Identify cognitive and physical deficits and behaviors that affect risk of falls    -  [place] fall precautions as indicated by assessment   - Educate patient/family on patient safety including physical limitations  - Instruct patient to call for assistance with activity based on assessment  - Modify environment to reduce risk of injury  - Consider OT/PT consult to assist with strengthening/mobility  Outcome: Progressing     Problem: HEMATOLOGIC - ADULT  Goal: Maintains hematologic stability  Description  INTERVENTIONS  - Assess for signs and symptoms of bleeding or hemorrhage  - Monitor labs  - Administer supportive blood products/factors as ordered and appropriate  Outcome: Progressing

## 2020-07-09 NOTE — PROGRESS NOTES
Evaluated bedside  S: Ate 1 meal today  Vomited after  Patient had episode of emesis  Feels dizzy when standing  No abdominal pain or bleeding per patient  O: Tachycardia noted, No abdominal tenderness    A/P: Stat CBC, NS bolus

## 2020-07-10 LAB
ABO GROUP BLD: NORMAL
ANION GAP SERPL CALCULATED.3IONS-SCNC: 3 MMOL/L (ref 4–13)
BLD GP AB SCN SERPL QL: NEGATIVE
BUN SERPL-MCNC: 9 MG/DL (ref 5–25)
CALCIUM SERPL-MCNC: 9.1 MG/DL (ref 8.3–10.1)
CHLORIDE SERPL-SCNC: 106 MMOL/L (ref 100–108)
CO2 SERPL-SCNC: 30 MMOL/L (ref 21–32)
CREAT SERPL-MCNC: 0.65 MG/DL (ref 0.6–1.3)
ERYTHROCYTE [DISTWIDTH] IN BLOOD BY AUTOMATED COUNT: 15.8 % (ref 11.6–15.1)
GFR SERPL CREATININE-BSD FRML MDRD: 120 ML/MIN/1.73SQ M
GLUCOSE SERPL-MCNC: 92 MG/DL (ref 65–140)
HCT VFR BLD AUTO: 20.8 % (ref 34.8–46.1)
HCT VFR BLD AUTO: 28.6 % (ref 34.8–46.1)
HGB BLD-MCNC: 6.4 G/DL (ref 11.5–15.4)
HGB BLD-MCNC: 8.9 G/DL (ref 11.5–15.4)
MCH RBC QN AUTO: 25.5 PG (ref 26.8–34.3)
MCHC RBC AUTO-ENTMCNC: 31.1 G/DL (ref 31.4–37.4)
MCV RBC AUTO: 82 FL (ref 82–98)
PLATELET # BLD AUTO: 603 THOUSANDS/UL (ref 149–390)
PMV BLD AUTO: 9.3 FL (ref 8.9–12.7)
POTASSIUM SERPL-SCNC: 3.3 MMOL/L (ref 3.5–5.3)
RBC # BLD AUTO: 3.49 MILLION/UL (ref 3.81–5.12)
RH BLD: POSITIVE
SODIUM SERPL-SCNC: 139 MMOL/L (ref 136–145)
SPECIMEN EXPIRATION DATE: NORMAL
WBC # BLD AUTO: 9.06 THOUSAND/UL (ref 4.31–10.16)

## 2020-07-10 PROCEDURE — 86901 BLOOD TYPING SEROLOGIC RH(D): CPT | Performed by: SURGERY

## 2020-07-10 PROCEDURE — 85018 HEMOGLOBIN: CPT | Performed by: SURGERY

## 2020-07-10 PROCEDURE — 86923 COMPATIBILITY TEST ELECTRIC: CPT

## 2020-07-10 PROCEDURE — 80048 BASIC METABOLIC PNL TOTAL CA: CPT | Performed by: SURGERY

## 2020-07-10 PROCEDURE — 85014 HEMATOCRIT: CPT | Performed by: SURGERY

## 2020-07-10 PROCEDURE — 86850 RBC ANTIBODY SCREEN: CPT | Performed by: SURGERY

## 2020-07-10 PROCEDURE — 86900 BLOOD TYPING SEROLOGIC ABO: CPT | Performed by: SURGERY

## 2020-07-10 PROCEDURE — 85027 COMPLETE CBC AUTOMATED: CPT | Performed by: SURGERY

## 2020-07-10 PROCEDURE — 30233N1 TRANSFUSION OF NONAUTOLOGOUS RED BLOOD CELLS INTO PERIPHERAL VEIN, PERCUTANEOUS APPROACH: ICD-10-PCS | Performed by: COLON & RECTAL SURGERY

## 2020-07-10 PROCEDURE — P9016 RBC LEUKOCYTES REDUCED: HCPCS

## 2020-07-10 RX ORDER — POLYETHYLENE GLYCOL 3350 17 G/17G
17 POWDER, FOR SOLUTION ORAL 2 TIMES DAILY
Status: DISCONTINUED | OUTPATIENT
Start: 2020-07-10 | End: 2020-07-11 | Stop reason: HOSPADM

## 2020-07-10 RX ORDER — DIPHENHYDRAMINE HCL 25 MG
25 TABLET ORAL ONCE
Status: COMPLETED | OUTPATIENT
Start: 2020-07-10 | End: 2020-07-10

## 2020-07-10 RX ORDER — SIMETHICONE 80 MG
160 TABLET,CHEWABLE ORAL ONCE
Status: COMPLETED | OUTPATIENT
Start: 2020-07-10 | End: 2020-07-10

## 2020-07-10 RX ORDER — POLYETHYLENE GLYCOL 3350 17 G/17G
17 POWDER, FOR SOLUTION ORAL DAILY
Status: DISCONTINUED | OUTPATIENT
Start: 2020-07-10 | End: 2020-07-10

## 2020-07-10 RX ORDER — ACETAMINOPHEN 325 MG/1
650 TABLET ORAL ONCE
Status: COMPLETED | OUTPATIENT
Start: 2020-07-10 | End: 2020-07-10

## 2020-07-10 RX ADMIN — POLYETHYLENE GLYCOL 3350 17 G: 17 POWDER, FOR SOLUTION ORAL at 11:40

## 2020-07-10 RX ADMIN — DIPHENHYDRAMINE HCL 25 MG: 25 TABLET ORAL at 02:31

## 2020-07-10 RX ADMIN — DOCUSATE SODIUM 100 MG: 100 CAPSULE, LIQUID FILLED ORAL at 09:36

## 2020-07-10 RX ADMIN — DOCUSATE SODIUM 100 MG: 100 CAPSULE, LIQUID FILLED ORAL at 16:57

## 2020-07-10 RX ADMIN — SIMETHICONE 160 MG: 80 TABLET, CHEWABLE ORAL at 21:15

## 2020-07-10 RX ADMIN — OXYCODONE HYDROCHLORIDE 10 MG: 10 TABLET ORAL at 06:49

## 2020-07-10 RX ADMIN — ACETAMINOPHEN 650 MG: 325 TABLET, FILM COATED ORAL at 02:31

## 2020-07-10 NOTE — PROGRESS NOTES
Progress Note - General Surgery   Moses Napoles 26 y o  female MRN: [id number]  Unit/Bed#: [facility] 923-01 Encounter: [id number]    Assessment:  S/p EUA and oversewing of the suture line for hemorrhoidectomy 7/8 due to decreasing Hgb even after clipping with colonoscopy  Overnight had 1 episode of vomiting  Repeat Hgb in the evening was 6 4 and was thus transfused 1 u  Just finished in the am     Plan:  F/u CBC today and BM  If no bloody BM and Hgb stable, likely can be discharged  Cont regular diet  Pain management  Nausea management    Subjective/Objective     Subjective: Doing well  No abdominal pain  Had some food in the night  No longer nauseous  No BM yet  Feels tired    Objective:     Blood pressure 95/70, pulse 102, temperature 98 6 °F (37 °C), temperature source Oral, resp  rate 14, height 5' (1 524 m), weight 46 7 kg (103 lb), SpO2 99 %  ,Body mass index is 20 12 kg/m²  Intake/Output Summary (Last 24 hours) at 7/10/2020 0601  Last data filed at 7/10/2020 0001  Gross per 24 hour   Intake 800 ml   Output 1251 ml   Net -451 ml       Invasive Devices     Peripheral Intravenous Line            Peripheral IV 07/05/20 Left Forearm 5 days                Physical Exam:   Gen: Alert, awake, oriented, NAD  Cards: RRR, not tachy  Pulm: no respiratory distress  Abd: soft, non-tender/distended      Lab, Imaging and other studies:I have personally reviewed pertinent lab results      VTE Pharmacologic Prophylaxis: Sequential compression device (Venodyne)   VTE Mechanical Prophylaxis: sequential compression device

## 2020-07-10 NOTE — NURSING NOTE
Pt had Hgb of 6 9 earlier in the day  Upon start of shift pt c/o dizziness and sob upon standing, nausea, increased generalized weakness, and unrelieved pain to back and ribs  Blood pressure taken, 95/62, tachycardic at times  SpO2 99% on room air  White surgery notifed and made aware, en route to bedside  Stat hgb ordered and obtained, now 6 4  [place] surgery notifed and acknowledged critical lab value  Pt ordered 1 unit PRBC to be prepared and transfused, type and screen pending, blood bank called, pre-meds administered, consent in pt's chart, will continue to monitor and will administer ordered 1 unit of PRBC when sent up by blood bank

## 2020-07-11 VITALS
BODY MASS INDEX: 20.22 KG/M2 | RESPIRATION RATE: 20 BRPM | OXYGEN SATURATION: 99 % | TEMPERATURE: 98.5 F | SYSTOLIC BLOOD PRESSURE: 113 MMHG | HEART RATE: 100 BPM | HEIGHT: 60 IN | DIASTOLIC BLOOD PRESSURE: 57 MMHG | WEIGHT: 103 LBS

## 2020-07-11 LAB
ABO GROUP BLD BPU: NORMAL
BPU ID: NORMAL
CROSSMATCH: NORMAL
ERYTHROCYTE [DISTWIDTH] IN BLOOD BY AUTOMATED COUNT: 16.5 % (ref 11.6–15.1)
HCT VFR BLD AUTO: 26.4 % (ref 34.8–46.1)
HGB BLD-MCNC: 8.3 G/DL (ref 11.5–15.4)
MCH RBC QN AUTO: 25.1 PG (ref 26.8–34.3)
MCHC RBC AUTO-ENTMCNC: 31.4 G/DL (ref 31.4–37.4)
MCV RBC AUTO: 80 FL (ref 82–98)
PLATELET # BLD AUTO: 541 THOUSANDS/UL (ref 149–390)
PMV BLD AUTO: 9.3 FL (ref 8.9–12.7)
RBC # BLD AUTO: 3.31 MILLION/UL (ref 3.81–5.12)
UNIT DISPENSE STATUS: NORMAL
UNIT PRODUCT CODE: NORMAL
UNIT RH: NORMAL
WBC # BLD AUTO: 8.8 THOUSAND/UL (ref 4.31–10.16)

## 2020-07-11 PROCEDURE — 85027 COMPLETE CBC AUTOMATED: CPT | Performed by: COLON & RECTAL SURGERY

## 2020-07-11 RX ADMIN — POLYETHYLENE GLYCOL 3350 17 G: 17 POWDER, FOR SOLUTION ORAL at 08:54

## 2020-07-11 RX ADMIN — DOCUSATE SODIUM 100 MG: 100 CAPSULE, LIQUID FILLED ORAL at 08:54

## 2020-07-11 NOTE — PLAN OF CARE
Problem: DISCHARGE PLANNING  Goal: Discharge to home or other facility with appropriate resources  Description  INTERVENTIONS:  - Identify barriers to discharge w/patient and caregiver  - Arrange for needed discharge resources and transportation as appropriate  - Identify discharge learning needs (meds, wound care, etc )  - Arrange for interpretive services to assist at discharge as needed  - Refer to Case Management Department for coordinating discharge planning if the patient needs post-hospital services based on physician/advanced practitioner order or complex needs related to functional status, cognitive ability, or social support system  Outcome: Progressing     Problem: Potential for Falls  Goal: Patient will remain free of falls  Description  INTERVENTIONS:  - Assess patient frequently for physical needs  -  Identify cognitive and physical deficits and behaviors that affect risk of falls    -  [place] fall precautions as indicated by assessment   - Educate patient/family on patient safety including physical limitations  - Instruct patient to call for assistance with activity based on assessment  - Modify environment to reduce risk of injury  - Consider OT/PT consult to assist with strengthening/mobility  Outcome: Progressing     Problem: HEMATOLOGIC - ADULT  Goal: Maintains hematologic stability  Description  INTERVENTIONS  - Assess for signs and symptoms of bleeding or hemorrhage  - Monitor labs  - Administer supportive blood products/factors as ordered and appropriate  Outcome: Progressing

## 2020-07-11 NOTE — DISCHARGE INSTRUCTIONS
Thrombosed Hemorrhoid   WHAT YOU NEED TO KNOW:   What is a thrombosed hemorrhoid? A thrombosed hemorrhoid happens when blood clots become trapped inside your hemorrhoid  It is a common complication of hemorrhoids  Your hemorrhoid may suddenly look swollen or blue and feel very painful  How is a thrombosed hemorrhoid treated? You may be given medicine to decrease pain and swelling  The medicine may be a pill, pad, cream, or ointment  Your healthcare provider may make an incision in the hemorrhoid to relieve your pain  He will numb the area and make a small cut in the hemorrhoid  He will remove blood clots and fluid  Your incision may be packed with gauze and left open to heal  Your healthcare provider may instead close the incision with stitches  If your incision is left open, you may have light bleeding from the area  You may also bleed when you have a bowel movement  This should get better in a few days  What can I do to care for myself? · Take a sitz bath  A sitz bath can help decrease pain and swelling  Take a sitz bath 3 times a day, and after each bowel movement  Fill a bathtub with 4 to 6 inches of warm water  You may also use a sitz bath pan that fits inside a toilet bowl  Sit in the sitz bath for 15 minutes  · Apply ice on your anus for 15 to 20 minutes every hour or as directed  Use an ice pack, or put crushed ice in a plastic bag  Cover it with a towel before you apply it to your anus  Ice helps prevent tissue damage and decreases swelling and pain  · Keep your anal area clean  Gently wash the area with warm water daily  Soap may irritate the area  After a bowel movement, wipe with moist towelettes or wet toilet paper  Dry toilet paper can irritate the area  How do I care for my wound? Do the following if your healthcare provider has made an incision in your hemorrhoid:  · Remove your bandage in 6 hours or as directed       · Ask your healthcare provider if you need to replace your bandage  If he tells you to, pat dry the area after your sitz bath  Put on new, clean bandages as directed  Change your bandages when they get wet or dirty  Wear a sanitary pad to absorb bleeding  How can I help prevent hemorrhoids? · Do not strain to have a bowel movement  Do not sit on the toilet too long  These actions increase pressure in your rectum and anus  · Drink plenty of liquids  Liquids can help prevent constipation  Ask how much liquid to drink each day and which liquids are best for you  · Eat a variety of high-fiber foods  Examples include fruits, vegetables, and whole grains  Ask your healthcare provider how much fiber you need each day  You may need to take a fiber supplement  · Exercise as directed  Exercise, such as walking, may make it easier to have a bowel movement  Ask your healthcare provider to help you create an exercise plan  · Avoid heavy lifting  This can cause straining and increase your risk for another thrombosed hemorrhoid  When should I seek immediate care? · Your pain does not get better after you take medicine for pain  · You have heavy bleeding from your anus that fills 1 or more sanitary pads in 1 hour  When should I contact my healthcare provider? · You have a fever  · You have pus or a foul-smelling odor coming from your incision  · You have questions or concerns about your condition or care  CARE AGREEMENT:   You have the right to help plan your care  Learn about your health condition and how it may be treated  Discuss treatment options with your caregivers to decide what care you want to receive  You always have the right to refuse treatment  The above information is an  only  It is not intended as medical advice for individual conditions or treatments  Talk to your doctor, nurse or pharmacist before following any medical regimen to see if it is safe and effective for you    © 2017 [employer]  Information is for End User's use only and may not be sold, redistributed or otherwise used for commercial purposes  All illustrations and images included in CareNotes® are the copyrighted property of A D A M [employer]  or Steve Macias  Please take over the counter stool softeners (Miralax and Colace)  Please call/return if you have further signs of bleeding, light headedness, or fatigue

## 2020-07-11 NOTE — PROGRESS NOTES
Progress Note - General Surgery   Hilario Valdes 26 y o  female MRN: [id number]  Unit/Bed#: [facility] 923-01 Encounter: [id number]    Assessment:  S/p EUA and oversewing of the suture line for hemorrhoidectomy 7/8 due to decreasing Hgb even after clipping with colonoscopy  Plan:  Continue bowel regimen and stool softeners  Diet as tolerated    Hemoglobin completely stable 8 3 from 8 9, will discharge home today on MiraLax and Colace, patient can take MiraLax twice a day during this acute phase    Subjective/Objective     Subjective:  Doing well  Did have a bowel movement yesterday did have some blood in it however no issues with lightheadedness dizziness  Objective:     Blood pressure (!) 90/44, pulse 78, temperature 98 5 °F (36 9 °C), temperature source Oral, resp  rate 20, height 5' (1 524 m), weight 46 7 kg (103 lb), SpO2 99 %  ,Body mass index is 20 12 kg/m²  Intake/Output Summary (Last 24 hours) at 7/11/2020 0736  Last data filed at 7/11/2020 8399  Gross per 24 hour   Intake 500 ml   Output 925 ml   Net -425 ml       Invasive Devices     None                 Physical Exam: General: AAOx3  Head: normocephalic, atraumatic  Neck: supple, trachea midline  Respiratory: BS b/l  Abdomen: Soft, NT   - no blood at rectum  Heart: RRR, S1s2  Ext: Warm no cyanosis           Lab, Imaging and other studies:I have personally reviewed pertinent lab results      VTE Pharmacologic Prophylaxis: Sequential compression device (Venodyne)   VTE Mechanical Prophylaxis: sequential compression device

## 2020-11-15 ENCOUNTER — OFFICE VISIT (OUTPATIENT)
Dept: URGENT CARE | Facility: MEDICAL CENTER | Age: 30
End: 2020-11-15
Payer: COMMERCIAL

## 2020-11-15 ENCOUNTER — HOSPITAL ENCOUNTER (EMERGENCY)
Facility: HOSPITAL | Age: 30
Discharge: HOME | End: 2020-11-15
Attending: EMERGENCY MEDICINE | Admitting: EMERGENCY MEDICINE
Payer: COMMERCIAL

## 2020-11-15 ENCOUNTER — APPOINTMENT (EMERGENCY)
Dept: ULTRASOUND IMAGING | Facility: HOSPITAL | Age: 30
End: 2020-11-15
Payer: COMMERCIAL

## 2020-11-15 ENCOUNTER — APPOINTMENT (EMERGENCY)
Dept: CT IMAGING | Facility: HOSPITAL | Age: 30
End: 2020-11-15
Payer: COMMERCIAL

## 2020-11-15 VITALS
DIASTOLIC BLOOD PRESSURE: 66 MMHG | WEIGHT: 107.36 LBS | OXYGEN SATURATION: 99 % | RESPIRATION RATE: 18 BRPM | TEMPERATURE: 98.7 F | BODY MASS INDEX: 20.97 KG/M2 | HEART RATE: 95 BPM | SYSTOLIC BLOOD PRESSURE: 166 MMHG

## 2020-11-15 VITALS
SYSTOLIC BLOOD PRESSURE: 112 MMHG | WEIGHT: 104 LBS | BODY MASS INDEX: 20.42 KG/M2 | HEART RATE: 80 BPM | RESPIRATION RATE: 16 BRPM | OXYGEN SATURATION: 99 % | DIASTOLIC BLOOD PRESSURE: 72 MMHG | HEIGHT: 60 IN | TEMPERATURE: 99.3 F

## 2020-11-15 DIAGNOSIS — R10.9 ABDOMINAL PAIN: Primary | ICD-10-CM

## 2020-11-15 DIAGNOSIS — N83.209 OVARIAN CYST: ICD-10-CM

## 2020-11-15 DIAGNOSIS — R10.31 RIGHT LOWER QUADRANT ABDOMINAL PAIN: Primary | ICD-10-CM

## 2020-11-15 LAB
ALBUMIN SERPL BCP-MCNC: 4.1 G/DL (ref 3.5–5)
ALP SERPL-CCNC: 87 U/L (ref 46–116)
ALT SERPL W P-5'-P-CCNC: 29 U/L (ref 12–78)
ANION GAP SERPL CALCULATED.3IONS-SCNC: 11 MMOL/L (ref 4–13)
AST SERPL W P-5'-P-CCNC: 25 U/L (ref 5–45)
BACTERIA UR QL AUTO: ABNORMAL /HPF
BASOPHILS # BLD AUTO: 0.07 THOUSANDS/ΜL (ref 0–0.1)
BASOPHILS NFR BLD AUTO: 1 % (ref 0–1)
BILIRUB SERPL-MCNC: 1.28 MG/DL (ref 0.2–1)
BILIRUB UR QL STRIP: NEGATIVE
BUN SERPL-MCNC: 13 MG/DL (ref 5–25)
CALCIUM SERPL-MCNC: 9.7 MG/DL (ref 8.3–10.1)
CHLORIDE SERPL-SCNC: 100 MMOL/L (ref 100–108)
CLARITY UR: CLEAR
CO2 SERPL-SCNC: 26 MMOL/L (ref 21–32)
COLOR UR: YELLOW
CREAT SERPL-MCNC: 0.72 MG/DL (ref 0.6–1.3)
EOSINOPHIL # BLD AUTO: 0.06 THOUSAND/ΜL (ref 0–0.61)
EOSINOPHIL NFR BLD AUTO: 1 % (ref 0–6)
ERYTHROCYTE [DISTWIDTH] IN BLOOD BY AUTOMATED COUNT: 19.6 % (ref 11.6–15.1)
EXT PREG TEST URINE: NEGATIVE
EXT. CONTROL ED NAV: NORMAL
GFR SERPL CREATININE-BSD FRML MDRD: 113 ML/MIN/1.73SQ M
GLUCOSE SERPL-MCNC: 87 MG/DL (ref 65–140)
GLUCOSE UR STRIP-MCNC: NEGATIVE MG/DL
HCT VFR BLD AUTO: 37.7 % (ref 34.8–46.1)
HGB BLD-MCNC: 10 G/DL (ref 11.5–15.4)
HGB UR QL STRIP.AUTO: ABNORMAL
IMM GRANULOCYTES # BLD AUTO: 0.02 THOUSAND/UL (ref 0–0.2)
IMM GRANULOCYTES NFR BLD AUTO: 0 % (ref 0–2)
KETONES UR STRIP-MCNC: ABNORMAL MG/DL
LEUKOCYTE ESTERASE UR QL STRIP: NEGATIVE
LIPASE SERPL-CCNC: 129 U/L (ref 73–393)
LYMPHOCYTES # BLD AUTO: 2.08 THOUSANDS/ΜL (ref 0.6–4.47)
LYMPHOCYTES NFR BLD AUTO: 29 % (ref 14–44)
MCH RBC QN AUTO: 17.7 PG (ref 26.8–34.3)
MCHC RBC AUTO-ENTMCNC: 26.5 G/DL (ref 31.4–37.4)
MCV RBC AUTO: 67 FL (ref 82–98)
MONOCYTES # BLD AUTO: 0.61 THOUSAND/ΜL (ref 0.17–1.22)
MONOCYTES NFR BLD AUTO: 9 % (ref 4–12)
NEUTROPHILS # BLD AUTO: 4.24 THOUSANDS/ΜL (ref 1.85–7.62)
NEUTS SEG NFR BLD AUTO: 60 % (ref 43–75)
NITRITE UR QL STRIP: NEGATIVE
NON-SQ EPI CELLS URNS QL MICRO: ABNORMAL /HPF
NRBC BLD AUTO-RTO: 0 /100 WBCS
PH UR STRIP.AUTO: 5.5 [PH] (ref 4.5–8)
PLATELET # BLD AUTO: 359 THOUSANDS/UL (ref 149–390)
PMV BLD AUTO: 9.3 FL (ref 8.9–12.7)
POTASSIUM SERPL-SCNC: 3.3 MMOL/L (ref 3.5–5.3)
PROT SERPL-MCNC: 9.4 G/DL (ref 6.4–8.2)
PROT UR STRIP-MCNC: NEGATIVE MG/DL
RBC # BLD AUTO: 5.65 MILLION/UL (ref 3.81–5.12)
RBC #/AREA URNS AUTO: ABNORMAL /HPF
SODIUM SERPL-SCNC: 137 MMOL/L (ref 136–145)
SP GR UR STRIP.AUTO: >=1.03 (ref 1–1.03)
UROBILINOGEN UR QL STRIP.AUTO: 0.2 E.U./DL
WBC # BLD AUTO: 7.08 THOUSAND/UL (ref 4.31–10.16)
WBC #/AREA URNS AUTO: ABNORMAL /HPF

## 2020-11-15 PROCEDURE — 76830 TRANSVAGINAL US NON-OB: CPT

## 2020-11-15 PROCEDURE — 99284 EMERGENCY DEPT VISIT MOD MDM: CPT | Performed by: EMERGENCY MEDICINE

## 2020-11-15 PROCEDURE — 99213 OFFICE O/P EST LOW 20 MIN: CPT | Performed by: PHYSICIAN ASSISTANT

## 2020-11-15 PROCEDURE — 80053 COMPREHEN METABOLIC PANEL: CPT | Performed by: EMERGENCY MEDICINE

## 2020-11-15 PROCEDURE — 99284 EMERGENCY DEPT VISIT MOD MDM: CPT

## 2020-11-15 PROCEDURE — 83690 ASSAY OF LIPASE: CPT | Performed by: EMERGENCY MEDICINE

## 2020-11-15 PROCEDURE — 74177 CT ABD & PELVIS W/CONTRAST: CPT

## 2020-11-15 PROCEDURE — 76856 US EXAM PELVIC COMPLETE: CPT

## 2020-11-15 PROCEDURE — 81025 URINE PREGNANCY TEST: CPT | Performed by: EMERGENCY MEDICINE

## 2020-11-15 PROCEDURE — 36415 COLL VENOUS BLD VENIPUNCTURE: CPT | Performed by: EMERGENCY MEDICINE

## 2020-11-15 PROCEDURE — 96360 HYDRATION IV INFUSION INIT: CPT

## 2020-11-15 PROCEDURE — 81001 URINALYSIS AUTO W/SCOPE: CPT

## 2020-11-15 PROCEDURE — 85025 COMPLETE CBC W/AUTO DIFF WBC: CPT | Performed by: EMERGENCY MEDICINE

## 2020-11-15 RX ORDER — NAPROXEN 500 MG/1
500 TABLET ORAL 2 TIMES DAILY WITH MEALS
Qty: 10 TABLET | Refills: 0 | Status: SHIPPED | OUTPATIENT
Start: 2020-11-15 | End: 2020-12-22 | Stop reason: ALTCHOICE

## 2020-11-15 RX ADMIN — IOHEXOL 100 ML: 350 INJECTION, SOLUTION INTRAVENOUS at 18:58

## 2020-11-15 RX ADMIN — SODIUM CHLORIDE 1000 ML: 0.9 INJECTION, SOLUTION INTRAVENOUS at 17:48

## 2020-11-23 ENCOUNTER — TELEPHONE (OUTPATIENT)
Dept: GASTROENTEROLOGY | Facility: AMBULARY SURGERY CENTER | Age: 30
End: 2020-11-23

## 2020-11-23 DIAGNOSIS — K21.9 GASTROESOPHAGEAL REFLUX DISEASE, UNSPECIFIED WHETHER ESOPHAGITIS PRESENT: Primary | ICD-10-CM

## 2020-11-23 RX ORDER — OMEPRAZOLE 40 MG/1
40 CAPSULE, DELAYED RELEASE ORAL DAILY
Qty: 30 CAPSULE | Refills: 4 | Status: SHIPPED | OUTPATIENT
Start: 2020-11-23 | End: 2020-12-10

## 2020-11-24 ENCOUNTER — TELEPHONE (OUTPATIENT)
Dept: GASTROENTEROLOGY | Facility: AMBULARY SURGERY CENTER | Age: 30
End: 2020-11-24

## 2020-11-24 DIAGNOSIS — K21.9 GASTROESOPHAGEAL REFLUX DISEASE WITHOUT ESOPHAGITIS: Primary | ICD-10-CM

## 2020-11-24 RX ORDER — PANTOPRAZOLE SODIUM 40 MG/1
40 TABLET, DELAYED RELEASE ORAL DAILY
Qty: 30 TABLET | Refills: 3 | Status: SHIPPED | OUTPATIENT
Start: 2020-11-24 | End: 2022-03-08

## 2020-12-10 ENCOUNTER — LAB (OUTPATIENT)
Dept: LAB | Facility: MEDICAL CENTER | Age: 30
End: 2020-12-10
Payer: COMMERCIAL

## 2020-12-10 ENCOUNTER — OFFICE VISIT (OUTPATIENT)
Dept: GASTROENTEROLOGY | Facility: CLINIC | Age: 30
End: 2020-12-10
Payer: COMMERCIAL

## 2020-12-10 VITALS
DIASTOLIC BLOOD PRESSURE: 80 MMHG | HEIGHT: 60 IN | TEMPERATURE: 97.8 F | SYSTOLIC BLOOD PRESSURE: 118 MMHG | WEIGHT: 103.8 LBS | HEART RATE: 122 BPM | BODY MASS INDEX: 20.38 KG/M2

## 2020-12-10 DIAGNOSIS — K64.9 BLEEDING HEMORRHOIDS: ICD-10-CM

## 2020-12-10 DIAGNOSIS — D64.9 ANEMIA, UNSPECIFIED TYPE: Primary | ICD-10-CM

## 2020-12-10 DIAGNOSIS — D64.9 ANEMIA, UNSPECIFIED TYPE: ICD-10-CM

## 2020-12-10 LAB
BASOPHILS # BLD AUTO: 0.07 THOUSANDS/ΜL (ref 0–0.1)
BASOPHILS NFR BLD AUTO: 1 % (ref 0–1)
EOSINOPHIL # BLD AUTO: 0.03 THOUSAND/ΜL (ref 0–0.61)
EOSINOPHIL NFR BLD AUTO: 1 % (ref 0–6)
ERYTHROCYTE [DISTWIDTH] IN BLOOD BY AUTOMATED COUNT: 19.2 % (ref 11.6–15.1)
FERRITIN SERPL-MCNC: 2 NG/ML (ref 8–388)
HCT VFR BLD AUTO: 39.2 % (ref 34.8–46.1)
HGB BLD-MCNC: 10.2 G/DL (ref 11.5–15.4)
IMM GRANULOCYTES # BLD AUTO: 0.01 THOUSAND/UL (ref 0–0.2)
IMM GRANULOCYTES NFR BLD AUTO: 0 % (ref 0–2)
IRON SATN MFR SERPL: 6 %
IRON SERPL-MCNC: 26 UG/DL (ref 50–170)
LYMPHOCYTES # BLD AUTO: 1.71 THOUSANDS/ΜL (ref 0.6–4.47)
LYMPHOCYTES NFR BLD AUTO: 33 % (ref 14–44)
MCH RBC QN AUTO: 17.7 PG (ref 26.8–34.3)
MCHC RBC AUTO-ENTMCNC: 26 G/DL (ref 31.4–37.4)
MCV RBC AUTO: 68 FL (ref 82–98)
MONOCYTES # BLD AUTO: 0.53 THOUSAND/ΜL (ref 0.17–1.22)
MONOCYTES NFR BLD AUTO: 10 % (ref 4–12)
NEUTROPHILS # BLD AUTO: 2.8 THOUSANDS/ΜL (ref 1.85–7.62)
NEUTS SEG NFR BLD AUTO: 55 % (ref 43–75)
NRBC BLD AUTO-RTO: 0 /100 WBCS
PLATELET # BLD AUTO: 408 THOUSANDS/UL (ref 149–390)
PMV BLD AUTO: 10.9 FL (ref 8.9–12.7)
RBC # BLD AUTO: 5.76 MILLION/UL (ref 3.81–5.12)
TIBC SERPL-MCNC: 454 UG/DL (ref 250–450)
WBC # BLD AUTO: 5.15 THOUSAND/UL (ref 4.31–10.16)

## 2020-12-10 PROCEDURE — 99214 OFFICE O/P EST MOD 30 MIN: CPT | Performed by: PHYSICIAN ASSISTANT

## 2020-12-10 PROCEDURE — 83550 IRON BINDING TEST: CPT

## 2020-12-10 PROCEDURE — 82728 ASSAY OF FERRITIN: CPT

## 2020-12-10 PROCEDURE — 1036F TOBACCO NON-USER: CPT | Performed by: PHYSICIAN ASSISTANT

## 2020-12-10 PROCEDURE — 83540 ASSAY OF IRON: CPT

## 2020-12-10 PROCEDURE — 36415 COLL VENOUS BLD VENIPUNCTURE: CPT

## 2020-12-10 PROCEDURE — 3008F BODY MASS INDEX DOCD: CPT | Performed by: PHYSICIAN ASSISTANT

## 2020-12-10 PROCEDURE — 85025 COMPLETE CBC W/AUTO DIFF WBC: CPT

## 2020-12-11 DIAGNOSIS — D64.9 ANEMIA, UNSPECIFIED TYPE: Primary | ICD-10-CM

## 2020-12-22 ENCOUNTER — OFFICE VISIT (OUTPATIENT)
Dept: FAMILY MEDICINE CLINIC | Facility: CLINIC | Age: 30
End: 2020-12-22

## 2020-12-22 VITALS
WEIGHT: 108 LBS | SYSTOLIC BLOOD PRESSURE: 112 MMHG | HEIGHT: 60 IN | BODY MASS INDEX: 21.2 KG/M2 | HEART RATE: 103 BPM | RESPIRATION RATE: 16 BRPM | OXYGEN SATURATION: 99 % | TEMPERATURE: 98 F | DIASTOLIC BLOOD PRESSURE: 60 MMHG

## 2020-12-22 DIAGNOSIS — F43.10 PTSD (POST-TRAUMATIC STRESS DISORDER): Primary | ICD-10-CM

## 2020-12-22 DIAGNOSIS — R00.0 TACHYCARDIA: ICD-10-CM

## 2020-12-22 PROCEDURE — 1036F TOBACCO NON-USER: CPT | Performed by: FAMILY MEDICINE

## 2020-12-22 PROCEDURE — 99204 OFFICE O/P NEW MOD 45 MIN: CPT | Performed by: FAMILY MEDICINE

## 2020-12-22 PROCEDURE — 3008F BODY MASS INDEX DOCD: CPT | Performed by: PHYSICIAN ASSISTANT

## 2020-12-22 PROCEDURE — 93000 ELECTROCARDIOGRAM COMPLETE: CPT | Performed by: FAMILY MEDICINE

## 2020-12-22 PROCEDURE — 3008F BODY MASS INDEX DOCD: CPT | Performed by: FAMILY MEDICINE

## 2020-12-22 PROCEDURE — 3725F SCREEN DEPRESSION PERFORMED: CPT | Performed by: FAMILY MEDICINE

## 2021-03-08 ENCOUNTER — TELEPHONE (OUTPATIENT)
Dept: GASTROENTEROLOGY | Facility: AMBULARY SURGERY CENTER | Age: 31
End: 2021-03-08

## 2021-03-08 NOTE — TELEPHONE ENCOUNTER
Patients GI provider:  Dr Jacqueline Richmond     Number to return call: [phone]     Reason for call: Pt calling requesting to speak with someone to discuss her hemoglobin and covid vaccine       Scheduled procedure/appointment date if applicable: Apt/procedure n/a

## 2021-03-09 NOTE — TELEPHONE ENCOUNTER
Patient has hx of bleeding hemorrhoids and anemia, she is s/p hemorrhoidectomy  Her last cbc on Dec 10 2020 was stable "10 2"  Her iron level was low "26", recommended foods rich in iron, supplement  Recheck in 3 months  She called to ask if she can proceed with [employer] COVID vaccine offered by her employer  She reports feeling well, denies any s/s of anemia  Discussed as long as feeling well, last hgb stable, okay to proceed  Please recheck CBC due now, she will go tomorrow  As long as result remains stable, she said she is comfortable scheduling vaccine  All questions/concerns addressed at this time

## 2021-03-10 ENCOUNTER — APPOINTMENT (OUTPATIENT)
Dept: LAB | Facility: MEDICAL CENTER | Age: 31
End: 2021-03-10
Payer: COMMERCIAL

## 2021-03-10 DIAGNOSIS — D64.9 ANEMIA, UNSPECIFIED TYPE: ICD-10-CM

## 2021-03-10 DIAGNOSIS — R00.0 TACHYCARDIA: ICD-10-CM

## 2021-03-10 LAB
BASOPHILS # BLD AUTO: 0.04 THOUSANDS/ΜL (ref 0–0.1)
BASOPHILS NFR BLD AUTO: 1 % (ref 0–1)
EOSINOPHIL # BLD AUTO: 0.11 THOUSAND/ΜL (ref 0–0.61)
EOSINOPHIL NFR BLD AUTO: 2 % (ref 0–6)
ERYTHROCYTE [DISTWIDTH] IN BLOOD BY AUTOMATED COUNT: 18.1 % (ref 11.6–15.1)
HCT VFR BLD AUTO: 38.1 % (ref 34.8–46.1)
HGB BLD-MCNC: 11.2 G/DL (ref 11.5–15.4)
IMM GRANULOCYTES # BLD AUTO: 0.01 THOUSAND/UL (ref 0–0.2)
IMM GRANULOCYTES NFR BLD AUTO: 0 % (ref 0–2)
LYMPHOCYTES # BLD AUTO: 2.07 THOUSANDS/ΜL (ref 0.6–4.47)
LYMPHOCYTES NFR BLD AUTO: 36 % (ref 14–44)
MCH RBC QN AUTO: 21.7 PG (ref 26.8–34.3)
MCHC RBC AUTO-ENTMCNC: 29.4 G/DL (ref 31.4–37.4)
MCV RBC AUTO: 74 FL (ref 82–98)
MONOCYTES # BLD AUTO: 0.6 THOUSAND/ΜL (ref 0.17–1.22)
MONOCYTES NFR BLD AUTO: 11 % (ref 4–12)
NEUTROPHILS # BLD AUTO: 2.87 THOUSANDS/ΜL (ref 1.85–7.62)
NEUTS SEG NFR BLD AUTO: 50 % (ref 43–75)
NRBC BLD AUTO-RTO: 0 /100 WBCS
PLATELET # BLD AUTO: 320 THOUSANDS/UL (ref 149–390)
PMV BLD AUTO: 10.5 FL (ref 8.9–12.7)
RBC # BLD AUTO: 5.16 MILLION/UL (ref 3.81–5.12)
TSH SERPL DL<=0.05 MIU/L-ACNC: 1.24 UIU/ML (ref 0.36–3.74)
WBC # BLD AUTO: 5.7 THOUSAND/UL (ref 4.31–10.16)

## 2021-03-10 PROCEDURE — 36415 COLL VENOUS BLD VENIPUNCTURE: CPT

## 2021-03-10 PROCEDURE — 85025 COMPLETE CBC W/AUTO DIFF WBC: CPT

## 2021-03-10 PROCEDURE — 84443 ASSAY THYROID STIM HORMONE: CPT

## 2021-03-11 ENCOUNTER — TELEPHONE (OUTPATIENT)
Dept: OTHER | Facility: HOSPITAL | Age: 31
End: 2021-03-11

## 2021-03-11 NOTE — TELEPHONE ENCOUNTER
Patient called to inform of normal result  No answer  Voicemail left to contact [facility] at  earliest convenience for clarification of results or to answer any questions

## 2021-03-11 NOTE — TELEPHONE ENCOUNTER
Patient's call back returned  Advised of normal results and answered concerns regarding COVID vaccine available to her and encouraged to get same as already planned

## 2021-03-12 ENCOUNTER — TELEPHONE (OUTPATIENT)
Dept: GASTROENTEROLOGY | Facility: CLINIC | Age: 31
End: 2021-03-12

## 2021-03-12 DIAGNOSIS — D64.9 ANEMIA, UNSPECIFIED TYPE: Primary | ICD-10-CM

## 2021-03-12 NOTE — TELEPHONE ENCOUNTER
Notified patient her HGB is improving, will repeat CBC in 3 months  Please schedule patient for a visit 3-4 months

## 2021-04-09 DIAGNOSIS — Z23 ENCOUNTER FOR IMMUNIZATION: ICD-10-CM

## 2021-04-21 ENCOUNTER — APPOINTMENT (OUTPATIENT)
Dept: LAB | Facility: MEDICAL CENTER | Age: 31
End: 2021-04-21
Payer: COMMERCIAL

## 2021-04-21 ENCOUNTER — TRANSCRIBE ORDERS (OUTPATIENT)
Dept: ADMINISTRATIVE | Facility: HOSPITAL | Age: 31
End: 2021-04-21

## 2021-04-21 DIAGNOSIS — E03.9 MYXEDEMA HEART DISEASE: ICD-10-CM

## 2021-04-21 DIAGNOSIS — I51.9 MYXEDEMA HEART DISEASE: ICD-10-CM

## 2021-04-21 DIAGNOSIS — E28.2 POLYCYSTIC OVARIES: ICD-10-CM

## 2021-04-21 DIAGNOSIS — E28.2 POLYCYSTIC OVARIES: Primary | ICD-10-CM

## 2021-04-21 LAB
ESTRADIOL SERPL-MCNC: 60 PG/ML
FSH SERPL-ACNC: 7.3 MIU/ML
LH SERPL-ACNC: 14.8 MIU/ML
PROLACTIN SERPL-MCNC: 14 NG/ML
T4 SERPL-MCNC: 10.5 UG/DL (ref 4.7–13.3)
TSH SERPL DL<=0.05 MIU/L-ACNC: 1.51 UIU/ML (ref 0.36–3.74)

## 2021-04-21 PROCEDURE — 84146 ASSAY OF PROLACTIN: CPT

## 2021-04-21 PROCEDURE — 84403 ASSAY OF TOTAL TESTOSTERONE: CPT

## 2021-04-21 PROCEDURE — 84402 ASSAY OF FREE TESTOSTERONE: CPT

## 2021-04-21 PROCEDURE — 84443 ASSAY THYROID STIM HORMONE: CPT

## 2021-04-21 PROCEDURE — 82627 DEHYDROEPIANDROSTERONE: CPT

## 2021-04-21 PROCEDURE — 82670 ASSAY OF TOTAL ESTRADIOL: CPT

## 2021-04-21 PROCEDURE — 83002 ASSAY OF GONADOTROPIN (LH): CPT

## 2021-04-21 PROCEDURE — 36415 COLL VENOUS BLD VENIPUNCTURE: CPT

## 2021-04-21 PROCEDURE — 84436 ASSAY OF TOTAL THYROXINE: CPT

## 2021-04-21 PROCEDURE — 83001 ASSAY OF GONADOTROPIN (FSH): CPT

## 2021-04-22 LAB
DHEA-S SERPL-MCNC: 196 UG/DL (ref 84.8–378)
TESTOST FREE SERPL-MCNC: 1 PG/ML (ref 0–4.2)
TESTOST SERPL-MCNC: 45 NG/DL (ref 8–48)

## 2021-05-17 ENCOUNTER — OFFICE VISIT (OUTPATIENT)
Dept: FAMILY MEDICINE CLINIC | Facility: CLINIC | Age: 31
End: 2021-05-17

## 2021-05-17 DIAGNOSIS — U07.1 COVID-19: ICD-10-CM

## 2021-05-17 DIAGNOSIS — U07.1 COVID-19: Primary | ICD-10-CM

## 2021-05-17 PROCEDURE — 99213 OFFICE O/P EST LOW 20 MIN: CPT | Performed by: FAMILY MEDICINE

## 2021-05-17 PROCEDURE — U0005 INFEC AGEN DETEC AMPLI PROBE: HCPCS | Performed by: FAMILY MEDICINE

## 2021-05-17 PROCEDURE — U0003 INFECTIOUS AGENT DETECTION BY NUCLEIC ACID (DNA OR RNA); SEVERE ACUTE RESPIRATORY SYNDROME CORONAVIRUS 2 (SARS-COV-2) (CORONAVIRUS DISEASE [COVID-19]), AMPLIFIED PROBE TECHNIQUE, MAKING USE OF HIGH THROUGHPUT TECHNOLOGIES AS DESCRIBED BY CMS-2020-01-R: HCPCS | Performed by: FAMILY MEDICINE

## 2021-05-17 NOTE — PROGRESS NOTES
COVID-19 Outpatient Progress Note    Assessment/Plan:    Problem List Items Addressed This Visit     None      Visit Diagnoses     COVID-19    -  Primary    Relevant Orders    Novel Coronavirus (Covid-19),PCR [facility] - Collected at [facility] or [facility]         Disposition:     I recommended COVID-19 PCR testing on or after day 5 since last exposure and if negative can end quarantine after 7 days  Patient was instructed to watch for symptoms until 14 days after exposure  If patient were to develop symptoms, they should immediately self isolate and call our office for further guidance  I referred patient to one of our centralized sites for a COVID-19 swab  I have spent 10 minutes directly with the patient  Greater than 50% of this time was spent in counseling/coordination of care regarding: prognosis, instructions for management, patient and family education, importance of treatment compliance, risk factor reductions and impressions  Encounter provider [address] [place] [address]    Provider located at [address] [id number] [phone]    Recent Visits  No visits were found meeting these conditions  Showing recent visits within past 7 days and meeting all other requirements     Today's Visits  Date Type Provider Dept   05/17/21 Office Visit [facility]   Showing today's visits and meeting all other requirements     Future Appointments  No visits were found meeting these conditions  Showing future appointments within next 150 days and meeting all other requirements        Patient agrees to participate in a virtual check in via telephone or video visit instead of presenting to the office to address urgent/immediate medical needs  Patient is aware this is a billable service  After connecting through Telephone, the patient was identified by name and date of birth   Connor Huston was informed that this was a telemedicine visit and that the exam was being conducted confidentially over secure lines  My office door was closed  No one else was in the room  Ana Paula Valiente acknowledged consent and understanding of privacy and security of the telemedicine visit  I informed the patient that I have reviewed her record in Epic and presented the opportunity for her to ask any questions regarding the visit today  The patient agreed to participate  Subjective:   Ana Paula Valiente is a 31 y o  female who is concerned about COVID-19  Patient's symptoms include nasal congestion, rhinorrhea, anosmia, loss of taste and headache  Patient denies fever, chills, fatigue, malaise, sore throat, cough, shortness of breath, chest tightness, abdominal pain, nausea, vomiting, diarrhea and myalgias       Date of symptom onset: 5/13/2021    Exposure:   Contact with a person who is under investigation (PUI) for or who is positive for COVID-19 within the last 14 days?: No    Hospitalized recently for fever and/or lower respiratory symptoms?: No      Currently a healthcare worker that is involved in direct patient care?: No      Works in a special setting where the risk of COVID-19 transmission may be high? (this may include long-term care, correctional and prison facilities; homeless shelters; assisted-living facilities and group homes ): No      Resident in a special setting where the risk of COVID-19 transmission may be high? (this may include long-term care, correctional and prison facilities; homeless shelters; assisted-living facilities and group homes ): No      Lab Results   Component Value Date    SARSCOV2 Negative 07/05/2020    [address] Not Detected 06/16/2020     Past Medical History:   Diagnosis Date    Anemia     iron def    History of transfusion     12/2019    Irritable bowel syndrome      Past Surgical History:   Procedure Laterality Date    COLONOSCOPY      EXAMINATION UNDER ANESTHESIA N/A 7/8/2020    Procedure: EXAM UNDER ANESTHESIA (EUA), diagnostic anoscopy with control of bleeding;  Surgeon: Sandee Worrell MD;  [facility];  Service: Colorectal    EXPLORATORY LAPAROTOMY      PA COLONOSCOPY FLX DX W/COLLJ SPEC WHEN PFRMD N/A 8/1/2018    Procedure: COLONOSCOPY;  Surgeon: Trevor Partida MD;  [facility]; Service: Colorectal    PA HEMORRHOIDOPEXY BY STAPLING N/A 6/23/2020    Procedure: Hemorrhoidectomy, internal and external,3 column;  Surgeon: Siddharth Rivers MD;  [facility];  Service: Colorectal    UPPER GASTROINTESTINAL ENDOSCOPY       Current Outpatient Medications   Medication Sig Dispense Refill    ferrous gluconate (FERGON) 324 mg tablet Take 1 tablet (324 mg total) by mouth 2 (two) times a day before meals  0    pantoprazole (PROTONIX) 40 mg tablet Take 1 tablet (40 mg total) by mouth daily 30 tablet 3     No current facility-administered medications for this visit  Allergies   Allergen Reactions    Latex      Condoms  Burning sensation wears normal underwear and can eat banans        Review of Systems   Constitutional: Negative for chills, fatigue and fever  HENT: Positive for congestion and rhinorrhea  Negative for sore throat  Respiratory: Negative for cough, chest tightness and shortness of breath  Gastrointestinal: Negative for abdominal pain, diarrhea, nausea and vomiting  Musculoskeletal: Negative for myalgias  Neurological: Positive for headaches  VIRTUAL VISIT DISCLAIMER    Martha Chilel acknowledges that she has consented to an online visit or consultation  She understands that the online visit is based solely on information provided by her, and that, in the absence of a face-to-face physical evaluation by the physician, the diagnosis she receives is both limited and provisional in terms of accuracy and completeness  This is not intended to replace a full medical face-to-face evaluation by the physician   Martha Chilel understands and accepts these terms

## 2021-05-18 LAB — SARS-COV-2 RNA RESP QL NAA+PROBE: NEGATIVE

## 2021-05-20 ENCOUNTER — TELEPHONE (OUTPATIENT)
Dept: FAMILY MEDICINE CLINIC | Facility: CLINIC | Age: 31
End: 2021-05-20

## 2021-05-20 ENCOUNTER — OFFICE VISIT (OUTPATIENT)
Dept: FAMILY MEDICINE CLINIC | Facility: CLINIC | Age: 31
End: 2021-05-20

## 2021-05-20 DIAGNOSIS — B34.9 VIRAL INFECTION, UNSPECIFIED: Primary | ICD-10-CM

## 2021-05-20 DIAGNOSIS — B34.9 VIRAL INFECTION, UNSPECIFIED: ICD-10-CM

## 2021-05-20 PROCEDURE — U0003 INFECTIOUS AGENT DETECTION BY NUCLEIC ACID (DNA OR RNA); SEVERE ACUTE RESPIRATORY SYNDROME CORONAVIRUS 2 (SARS-COV-2) (CORONAVIRUS DISEASE [COVID-19]), AMPLIFIED PROBE TECHNIQUE, MAKING USE OF HIGH THROUGHPUT TECHNOLOGIES AS DESCRIBED BY CMS-2020-01-R: HCPCS | Performed by: NURSE PRACTITIONER

## 2021-05-20 PROCEDURE — 99213 OFFICE O/P EST LOW 20 MIN: CPT | Performed by: NURSE PRACTITIONER

## 2021-05-20 PROCEDURE — U0005 INFEC AGEN DETEC AMPLI PROBE: HCPCS | Performed by: NURSE PRACTITIONER

## 2021-05-20 NOTE — PROGRESS NOTES
COVID-19 Outpatient Progress Note    Assessment/Plan:     Due to ongoing symptoms as noted in the ROS, pt's employer wants a repeat test   Advised pt that this may be billed to her, [facility] offering free testing  She understands  Also, daughter had viral illness as dx in ED from her  and patient believes she caught the same thing  Advised supportive measures, keeping hydrated, taking multivitamin, dayquil/Tylenol prn  Visit Diagnoses     Viral infection, unspecified    -  Primary    Relevant Orders    Novel Coronavirus (Covid-19),[facility] or [facility]           Disposition:     I recommended continued isolation until at least 24 hours have passed since recovery defined as resolution of fever without the use of fever-reducing medications AND improvement in COVID symptoms AND 10 days have passed since onset of symptoms (or 10 days have passed since date of first positive viral diagnostic test for asymptomatic patients)  I have spent 10 minutes directly with the patient  Greater than 50% of this time was spent in counseling/coordination of care regarding: diagnostic results, prognosis, risks and benefits of treatment options, instructions for management, patient and family education, importance of treatment compliance, risk factor reductions and impressions          Encounter provider [address] [place] [address]    Provider located at [address] [address] [phone]    Recent Visits  Date Type Provider Dept   05/17/21 Office Visit MD Conner Hoyos [facility] recent visits within past 7 days and meeting all other requirements     Today's Visits  Date Type Provider Dept   05/20/21 Telephone Thomas Rudd   05/20/21 Office Visit [facility] Conner Rudd   Showing today's visits and meeting all other requirements     Future Appointments  No visits were found meeting these conditions  Showing future appointments within next 150 days and meeting all other requirements        Patient agrees to participate in a virtual check in via telephone or video visit instead of presenting to the office to address urgent/immediate medical needs  Patient is aware this is a billable service  After connecting through Telephone, the patient was identified by name and date of birth  Luda Rangel was informed that this was a telemedicine visit and that the exam was being conducted confidentially over secure lines  My office door was closed  No one else was in the room  Luda Rangel acknowledged consent and understanding of privacy and security of the telemedicine visit  I informed the patient that I have reviewed her record in Epic and presented the opportunity for her to ask any questions regarding the visit today  The patient agreed to participate  It was my intent to perform this visit via video technology but the patient was not able to do a video connection so the visit was completed via audio telephone only  Subjective:   Luda Rangel is a 31 y o  female who has been screened for COVID-19  Symptom change since last report: unchanged  Patient's symptoms include chills, nasal congestion, rhinorrhea, sore throat, anosmia, loss of taste, cough and myalgias  Patient denies fever, fatigue, malaise, shortness of breath, chest tightness, abdominal pain, nausea, vomiting, diarrhea and headaches  Luis Angel Lawson has been staying home and has isolated themselves in her home  She is taking care to not share personal items and is cleaning all surfaces that are touched often, like counters, tabletops, and doorknobs using household cleaning sprays or wipes  She is wearing a mask when she leaves her room       Lab Results   Component Value Date    SARSCOV2 Negative 05/17/2021    SARSCOV2 Not Detected 06/16/2020     Past Medical History:   Diagnosis Date    Anemia     iron def    History of transfusion     12/2019    Irritable bowel syndrome      Past Surgical History:   Procedure Laterality Date    COLONOSCOPY      EXAMINATION UNDER ANESTHESIA N/A 7/8/2020    Procedure: EXAM UNDER ANESTHESIA (EUA), diagnostic anoscopy with control of bleeding;  Surgeon: Sarahy Vail MD;  [facility];  Service: Colorectal    EXPLORATORY LAPAROTOMY      UT COLONOSCOPY FLX DX W/COLLJ SPEC WHEN PFRMD N/A 8/1/2018    Procedure: COLONOSCOPY;  Surgeon: Tr Resendez MD;  [facility]; Service: Colorectal    UT HEMORRHOIDOPEXY BY STAPLING N/A 6/23/2020    Procedure: Hemorrhoidectomy, internal and external,3 column;  Surgeon: Kendrick Ahumada, MD;  [facility];  Service: Colorectal    UPPER GASTROINTESTINAL ENDOSCOPY       Current Outpatient Medications   Medication Sig Dispense Refill    ferrous gluconate (FERGON) 324 mg tablet Take 1 tablet (324 mg total) by mouth 2 (two) times a day before meals  0    pantoprazole (PROTONIX) 40 mg tablet Take 1 tablet (40 mg total) by mouth daily 30 tablet 3     No current facility-administered medications for this visit  Allergies   Allergen Reactions    Latex      Condoms  Burning sensation wears normal underwear and can eat banans        Review of Systems   Constitutional: Positive for chills  Negative for fatigue and fever  HENT: Positive for congestion, rhinorrhea and sore throat  Respiratory: Positive for cough  Negative for chest tightness and shortness of breath  Gastrointestinal: Negative for abdominal pain, diarrhea, nausea and vomiting  Musculoskeletal: Positive for myalgias  Neurological: Negative for headaches  Objective: There were no vitals filed for this visit  Physical Exam  VIRTUAL VISIT DISCLAIMER    Luda Rangel acknowledges that she has consented to an online visit or consultation   She understands that the online visit is based solely on information provided by her, and that, in the absence of a face-to-face physical evaluation by the physician, the diagnosis she receives is both limited and provisional in terms of accuracy and completeness  This is not intended to replace a full medical face-to-face evaluation by the physician  Francine Frazier understands and accepts these terms

## 2021-05-21 LAB — SARS-COV-2 RNA RESP QL NAA+PROBE: NEGATIVE

## 2021-05-21 NOTE — RESULT ENCOUNTER NOTE
I called Alexandra and let her know that her COVID-19 swab was negative  I advised her to continue social distancing procedures

## 2021-07-20 ENCOUNTER — APPOINTMENT (EMERGENCY)
Dept: ULTRASOUND IMAGING | Facility: HOSPITAL | Age: 31
End: 2021-07-20
Payer: COMMERCIAL

## 2021-07-20 ENCOUNTER — HOSPITAL ENCOUNTER (EMERGENCY)
Facility: HOSPITAL | Age: 31
Discharge: HOME | End: 2021-07-20
Attending: EMERGENCY MEDICINE | Admitting: EMERGENCY MEDICINE
Payer: COMMERCIAL

## 2021-07-20 ENCOUNTER — APPOINTMENT (EMERGENCY)
Dept: CT IMAGING | Facility: HOSPITAL | Age: 31
End: 2021-07-20
Payer: COMMERCIAL

## 2021-07-20 VITALS
WEIGHT: 113.98 LBS | DIASTOLIC BLOOD PRESSURE: 66 MMHG | SYSTOLIC BLOOD PRESSURE: 111 MMHG | RESPIRATION RATE: 16 BRPM | TEMPERATURE: 98.3 F | HEART RATE: 72 BPM | OXYGEN SATURATION: 100 % | BODY MASS INDEX: 22.26 KG/M2

## 2021-07-20 DIAGNOSIS — R10.9 ABDOMINAL PAIN: ICD-10-CM

## 2021-07-20 DIAGNOSIS — N83.201 BILATERAL OVARIAN CYSTS: Primary | ICD-10-CM

## 2021-07-20 DIAGNOSIS — N83.202 BILATERAL OVARIAN CYSTS: Primary | ICD-10-CM

## 2021-07-20 LAB
ANION GAP SERPL CALCULATED.3IONS-SCNC: 6 MMOL/L (ref 4–13)
BACTERIA UR QL AUTO: ABNORMAL /HPF
BASOPHILS # BLD AUTO: 0.03 THOUSANDS/ΜL (ref 0–0.1)
BASOPHILS NFR BLD AUTO: 1 % (ref 0–1)
BILIRUB UR QL STRIP: NEGATIVE
BUN SERPL-MCNC: 13 MG/DL (ref 5–25)
CALCIUM SERPL-MCNC: 8.8 MG/DL (ref 8.3–10.1)
CHLORIDE SERPL-SCNC: 105 MMOL/L (ref 100–108)
CLARITY UR: CLEAR
CO2 SERPL-SCNC: 29 MMOL/L (ref 21–32)
COLOR UR: YELLOW
CREAT SERPL-MCNC: 0.68 MG/DL (ref 0.6–1.3)
EOSINOPHIL # BLD AUTO: 0.12 THOUSAND/ΜL (ref 0–0.61)
EOSINOPHIL NFR BLD AUTO: 2 % (ref 0–6)
ERYTHROCYTE [DISTWIDTH] IN BLOOD BY AUTOMATED COUNT: 15.7 % (ref 11.6–15.1)
EXT PREG TEST URINE: NEGATIVE
EXT. CONTROL ED NAV: NORMAL
GFR SERPL CREATININE-BSD FRML MDRD: 117 ML/MIN/1.73SQ M
GLUCOSE SERPL-MCNC: 100 MG/DL (ref 65–140)
GLUCOSE UR STRIP-MCNC: NEGATIVE MG/DL
HCT VFR BLD AUTO: 39.1 % (ref 34.8–46.1)
HGB BLD-MCNC: 12.1 G/DL (ref 11.5–15.4)
HGB UR QL STRIP.AUTO: NEGATIVE
IMM GRANULOCYTES # BLD AUTO: 0.02 THOUSAND/UL (ref 0–0.2)
IMM GRANULOCYTES NFR BLD AUTO: 0 % (ref 0–2)
KETONES UR STRIP-MCNC: ABNORMAL MG/DL
LEUKOCYTE ESTERASE UR QL STRIP: ABNORMAL
LYMPHOCYTES # BLD AUTO: 1.97 THOUSANDS/ΜL (ref 0.6–4.47)
LYMPHOCYTES NFR BLD AUTO: 32 % (ref 14–44)
MCH RBC QN AUTO: 24.6 PG (ref 26.8–34.3)
MCHC RBC AUTO-ENTMCNC: 30.9 G/DL (ref 31.4–37.4)
MCV RBC AUTO: 80 FL (ref 82–98)
MONOCYTES # BLD AUTO: 0.71 THOUSAND/ΜL (ref 0.17–1.22)
MONOCYTES NFR BLD AUTO: 11 % (ref 4–12)
NEUTROPHILS # BLD AUTO: 3.38 THOUSANDS/ΜL (ref 1.85–7.62)
NEUTS SEG NFR BLD AUTO: 54 % (ref 43–75)
NITRITE UR QL STRIP: NEGATIVE
NON-SQ EPI CELLS URNS QL MICRO: ABNORMAL /HPF
NRBC BLD AUTO-RTO: 0 /100 WBCS
PH UR STRIP.AUTO: 6 [PH] (ref 4.5–8)
PLATELET # BLD AUTO: 277 THOUSANDS/UL (ref 149–390)
PMV BLD AUTO: 9.9 FL (ref 8.9–12.7)
POTASSIUM SERPL-SCNC: 3.4 MMOL/L (ref 3.5–5.3)
PROT UR STRIP-MCNC: NEGATIVE MG/DL
RBC # BLD AUTO: 4.91 MILLION/UL (ref 3.81–5.12)
RBC #/AREA URNS AUTO: ABNORMAL /HPF
SODIUM SERPL-SCNC: 140 MMOL/L (ref 136–145)
SP GR UR STRIP.AUTO: >=1.03 (ref 1–1.03)
UROBILINOGEN UR QL STRIP.AUTO: 0.2 E.U./DL
WBC # BLD AUTO: 6.23 THOUSAND/UL (ref 4.31–10.16)
WBC #/AREA URNS AUTO: ABNORMAL /HPF

## 2021-07-20 PROCEDURE — 99284 EMERGENCY DEPT VISIT MOD MDM: CPT | Performed by: EMERGENCY MEDICINE

## 2021-07-20 PROCEDURE — 74177 CT ABD & PELVIS W/CONTRAST: CPT

## 2021-07-20 PROCEDURE — 81025 URINE PREGNANCY TEST: CPT | Performed by: EMERGENCY MEDICINE

## 2021-07-20 PROCEDURE — 76830 TRANSVAGINAL US NON-OB: CPT

## 2021-07-20 PROCEDURE — 36415 COLL VENOUS BLD VENIPUNCTURE: CPT | Performed by: EMERGENCY MEDICINE

## 2021-07-20 PROCEDURE — 85025 COMPLETE CBC W/AUTO DIFF WBC: CPT | Performed by: EMERGENCY MEDICINE

## 2021-07-20 PROCEDURE — 99284 EMERGENCY DEPT VISIT MOD MDM: CPT

## 2021-07-20 PROCEDURE — 81001 URINALYSIS AUTO W/SCOPE: CPT

## 2021-07-20 PROCEDURE — 76856 US EXAM PELVIC COMPLETE: CPT

## 2021-07-20 PROCEDURE — 96374 THER/PROPH/DIAG INJ IV PUSH: CPT

## 2021-07-20 PROCEDURE — 80048 BASIC METABOLIC PNL TOTAL CA: CPT | Performed by: EMERGENCY MEDICINE

## 2021-07-20 PROCEDURE — 96376 TX/PRO/DX INJ SAME DRUG ADON: CPT

## 2021-07-20 RX ORDER — KETOROLAC TROMETHAMINE 30 MG/ML
15 INJECTION, SOLUTION INTRAMUSCULAR; INTRAVENOUS ONCE
Status: COMPLETED | OUTPATIENT
Start: 2021-07-20 | End: 2021-07-20

## 2021-07-20 RX ORDER — NAPROXEN 500 MG/1
500 TABLET ORAL 2 TIMES DAILY WITH MEALS
Qty: 10 TABLET | Refills: 0 | Status: SHIPPED | OUTPATIENT
Start: 2021-07-20 | End: 2022-03-08

## 2021-07-20 RX ADMIN — IOHEXOL 100 ML: 350 INJECTION, SOLUTION INTRAVENOUS at 14:04

## 2021-07-20 RX ADMIN — KETOROLAC TROMETHAMINE 15 MG: 30 INJECTION, SOLUTION INTRAMUSCULAR; INTRAVENOUS at 13:42

## 2021-07-20 RX ADMIN — KETOROLAC TROMETHAMINE 15 MG: 30 INJECTION, SOLUTION INTRAMUSCULAR; INTRAVENOUS at 14:31

## 2021-07-20 NOTE — ED PROVIDER NOTES
History  Chief Complaint   Patient presents with    Abdominal Pain     pt c/o RLQ for a few days thought it was related to her period but not due for period yet  states pain continues to get stronger  LMP 6/ states normally irregular  denies n/v      31 y o  F p/w RLQ pain x 2 days  Crampy, intermittent, nonradiating  Denies F/C, N/V/D/C, urinary complaints  History provided by:  Patient   used: No    Abdominal Pain  Pain location:  RLQ  Pain quality: cramping    Pain radiates to:  Does not radiate  Duration:  2 days  Timing:  Intermittent  Progression:  Unchanged  Chronicity:  New  Context: previous surgery (Ex laparotomy, and a "abdominal surgery when I was "  )    Relieved by:  None tried  Worsened by:  Nothing  Ineffective treatments:  NSAIDs  Associated symptoms: no constipation, no diarrhea, no dysuria, no fever, no nausea and no vomiting        Prior to Admission Medications   Prescriptions Last Dose Informant Patient Reported?  Taking?   ferrous gluconate (FERGON) 324 mg tablet Not Taking at Unknown time Self No No   Sig: Take 1 tablet (324 mg total) by mouth 2 (two) times a day before meals   Patient not taking: Reported on 2021   pantoprazole (PROTONIX) 40 mg tablet Not Taking at Unknown time Self No No   Sig: Take 1 tablet (40 mg total) by mouth daily   Patient not taking: Reported on 2021      Facility-Administered Medications: None       Past Medical History:   Diagnosis Date    Anemia     iron def    History of transfusion     2019    Irritable bowel syndrome        Past Surgical History:   Procedure Laterality Date    COLONOSCOPY      EXAMINATION UNDER ANESTHESIA N/A 2020    Procedure: EXAM UNDER ANESTHESIA (EUA), diagnostic anoscopy with control of bleeding;  Surgeon: Linda Montano MD;  [facility];  Service: Colorectal    EXPLORATORY LAPAROTOMY      ID COLONOSCOPY FLX DX W/COLLJ SPEC WHEN PFRMD N/A 2018    Procedure: COLONOSCOPY;  Surgeon: Marisabel Rose MD;  Location: [facility]; Service: Colorectal    MA HEMORRHOIDOPEXY BY STAPLING N/A 6/23/2020    Procedure: Hemorrhoidectomy, internal and external,3 column;  Surgeon: Brown Shaffer MD;  Location:  MAIN OR;  Service: Colorectal    UPPER GASTROINTESTINAL ENDOSCOPY         Family History   Problem Relation Age of Onset    Diabetes Mother     HIV Father     Colon cancer Neg Hx      I have reviewed and agree with the history as documented  E-Cigarette/Vaping    E-Cigarette Use Never User      E-Cigarette/Vaping Substances    Nicotine No     THC No     CBD No     Flavoring No     Other No     Unknown No      Social History     Tobacco Use    Smoking status: Never Smoker    Smokeless tobacco: Never Used   Vaping Use    Vaping Use: Never used   Substance Use Topics    Alcohol use: Not Currently    Drug use: No       Review of Systems   Constitutional: Negative for fever  Gastrointestinal: Positive for abdominal pain  Negative for constipation, diarrhea, nausea and vomiting  Genitourinary: Negative for dysuria and frequency  All other systems reviewed and are negative  Physical Exam  Physical Exam  Vitals and nursing note reviewed  Constitutional:       General: She is not in acute distress  Appearance: Normal appearance  She is well-developed  She is not ill-appearing, toxic-appearing or diaphoretic  HENT:      Head: Normocephalic and atraumatic  Eyes:      General: No scleral icterus  Neck:      Vascular: No JVD  Trachea: Trachea normal    Cardiovascular:      Rate and Rhythm: Normal rate and regular rhythm  Heart sounds: Normal heart sounds  No murmur heard  No friction rub  Pulmonary:      Effort: Pulmonary effort is normal  No accessory muscle usage or respiratory distress  Breath sounds: Normal breath sounds  No stridor  No wheezing, rhonchi or rales  Abdominal:      General: There is no distension  Palpations: Abdomen is soft  Abdomen is not rigid  There is no mass  Tenderness: There is abdominal tenderness in the right lower quadrant  There is no guarding or rebound  Positive signs include McBurney's sign  Negative signs include Tovar's sign  Musculoskeletal:      Cervical back: Normal range of motion  Skin:     General: Skin is warm and dry  Coloration: Skin is not pale  Findings: No rash  Neurological:      Mental Status: She is alert  GCS: GCS eye subscore is 4  GCS verbal subscore is 5  GCS motor subscore is 6     Psychiatric:         Behavior: Behavior normal          Vital Signs  ED Triage Vitals   Temperature Pulse Respirations Blood Pressure SpO2   07/20/21 1306 07/20/21 1306 07/20/21 1306 07/20/21 1306 07/20/21 1306   98 3 °F (36 8 °C) 83 16 124/61 100 %      Temp Source Heart Rate Source Patient Position - Orthostatic VS BP Location FiO2 (%)   07/20/21 1306 07/20/21 1620 07/20/21 1620 07/20/21 1620 --   Oral Monitor Lying Right arm       Pain Score       07/20/21 1306       7           Vitals:    07/20/21 1306 07/20/21 1620   BP: 124/61 111/66   Pulse: 83 72   Patient Position - Orthostatic VS:  Lying         Visual Acuity      ED Medications  Medications   ketorolac (TORADOL) injection 15 mg (15 mg Intravenous Given 7/20/21 1342)   iohexol (OMNIPAQUE) 350 MG/ML injection (SINGLE-DOSE) 100 mL (100 mL Intravenous Given 7/20/21 1404)   ketorolac (TORADOL) injection 15 mg (15 mg Intravenous Given 7/20/21 1431)       Diagnostic Studies  Results Reviewed     Procedure Component Value Units Date/Time    Basic metabolic panel [[id number]]  (Abnormal) Collected: 07/20/21 1338    Lab Status: Final result Specimen: Blood from Arm, Right Updated: 07/20/21 1355     Sodium 140 mmol/L      Potassium 3 4 mmol/L      Chloride 105 mmol/L      CO2 29 mmol/L      ANION GAP 6 mmol/L      BUN 13 mg/dL      Creatinine 0 68 mg/dL      Glucose 100 mg/dL      Calcium 8 8 mg/dL      eGFR 117 ml/min/1 73sq m     Narrative:      National Kidney Disease Foundation guidelines for Chronic Kidney Disease (CKD):     Stage 1 with normal or high GFR (GFR > 90 mL/min/1 73 square meters)    Stage 2 Mild CKD (GFR = 60-89 mL/min/1 73 square meters)    Stage 3A Moderate CKD (GFR = 45-59 mL/min/1 73 square meters)    Stage 3B Moderate CKD (GFR = 30-44 mL/min/1 73 square meters)    Stage 4 Severe CKD (GFR = 15-29 mL/min/1 73 square meters)    Stage 5 End Stage CKD (GFR <15 mL/min/1 73 square meters)  Note: GFR calculation is accurate only with a steady state creatinine    Urine Microscopic [[id number]]  (Abnormal) Collected: 07/20/21 1314    Lab Status: Final result Specimen: Urine, Other Updated: 07/20/21 1345     RBC, UA 0-1 /hpf      WBC, UA 4-10 /hpf      Epithelial Cells Occasional /hpf      Bacteria, UA Occasional /hpf     CBC and differential [[id number]]  (Abnormal) Collected: 07/20/21 1338    Lab Status: Final result Specimen: Blood from Arm, Right Updated: 07/20/21 1343     WBC 6 23 Thousand/uL      RBC 4 91 Million/uL      Hemoglobin 12 1 g/dL      Hematocrit 39 1 %      MCV 80 fL      MCH 24 6 pg      MCHC 30 9 g/dL      RDW 15 7 %      MPV 9 9 fL      Platelets 905 Thousands/uL      nRBC 0 /100 WBCs      Neutrophils Relative 54 %      Immat GRANS % 0 %      Lymphocytes Relative 32 %      Monocytes Relative 11 %      Eosinophils Relative 2 %      Basophils Relative 1 %      Neutrophils Absolute 3 38 Thousands/µL      Immature Grans Absolute 0 02 Thousand/uL      Lymphocytes Absolute 1 97 Thousands/µL      Monocytes Absolute 0 71 Thousand/µL      Eosinophils Absolute 0 12 Thousand/µL      Basophils Absolute 0 03 Thousands/µL     POCT pregnancy, urine [[id number]]  (Normal) Resulted: 07/20/21 1318    Lab Status: Final result Updated: 07/20/21 1318     EXT PREG TEST UR (Ref: Negative) negative     Control valid    Urine Macroscopic, POC [[id number]]  (Abnormal) Collected: 07/20/21 1314    Lab Status: Final result Specimen: Urine Updated: 07/20/21 1317     Color, UA Yellow     Clarity, UA Clear     pH, UA 6 0     Leukocytes, UA Trace     Nitrite, UA Negative     Protein, UA Negative mg/dl      Glucose, UA Negative mg/dl      Ketones, UA Trace mg/dl      Urobilinogen, UA 0 2 E U /dl      Bilirubin, UA Negative     Blood, UA Negative     Specific Gravity, UA >=1 030    Narrative:      CLINITEK RESULT                 US pelvis complete w transvaginal   Final Result by Viviane Oliveira MD (07/20 1640)       No evidence of ovarian torsion  4 cm complex cyst of the right ovary with imaging characteristics suggestive of a hemorrhagic cyst   Associated mild complex free fluid  Stable 1 5 cm nodule of the left ovary with echogenic rim  While the appearance may suggest a corpus luteal cyst, the appearance appears similar to that of prior  Calcified cyst can also be considered  Workstation performed: [id number]         CT abdomen pelvis with contrast   Final Result by Katrina Diego MD (07/20 1429)      The appendix is not definitively identified, though there is no acute inflammation in the right lower quadrant to suggest acute appendicitis  Bilateral ovarian cystic lesions, right larger than left, are unchanged from November 15, 2020, and seen to advantage on prior ultrasound  No acute abdominopelvic pathology  Workstation performed: [id number]                    Procedures  Procedures         ED Course  ED Course as of Jul 20 1711   Tue Jul 20, 2021   1342 Pt given Toradol      1421 Pt reports pain is improved  Now a 6/10  2nd dose of Toradol ordered  A/w CT read  1431 Pt given Toradol  2[address] [place] pt aware of CT results and right ovarian cyst on CT scan that I will order US to r/o torsion        1650 Updated pt on US results and instructed her to f/u with OBGYN                                SBIRT 20yo+      Most Recent Value   SBIRT (22 yo +)   In order to provide better care to our patients, we are screening all of our patients for alcohol and drug use  Would it be okay to ask you these screening questions? Unable to answer at this time Filed at: 07/20/2021 1311                    MDM    Disposition  Final diagnoses:   Bilateral ovarian cysts   Abdominal pain     Time reflects when diagnosis was documented in both MDM as applicable and the Disposition within this note     Time User Action Codes Description Comment    7/20/2021  4:47 PM Gurvinder Hi [N83 201,  N83 202] Bilateral ovarian cysts     7/20/2021  4:47 PM Thompson Fuentes 47 [R10 9] Abdominal pain       ED Disposition     ED Disposition Condition Date/Time Comment    Discharge Stable Tue Jul 20, 2021  4:48 PM Velinda All discharge to home/self care  Follow-up Information     Follow up With Specialties Details Why Contact Info    Your OBGYN  Schedule an appointment as soon as possible for a visit  For follow up of ovarian cysts           Discharge Medication List as of 7/20/2021  4:48 PM      START taking these medications    Details   naproxen (NAPROSYN) 500 mg tablet Take 1 tablet (500 mg total) by mouth 2 (two) times a day with meals, Starting Tue 7/20/2021, Print         CONTINUE these medications which have NOT CHANGED    Details   ferrous gluconate (FERGON) 324 mg tablet Take 1 tablet (324 mg total) by mouth 2 (two) times a day before meals, Starting Tue 12/24/2019, No Print      pantoprazole (PROTONIX) 40 mg tablet Take 1 tablet (40 mg total) by mouth daily, Starting Tue 11/24/2020, Normal           No discharge procedures on file      PDMP Review     None          ED Provider  Electronically Signed by           Brynn Rivera 23, DO  07/20/21 7850

## 2021-07-22 ENCOUNTER — VBI (OUTPATIENT)
Dept: FAMILY MEDICINE CLINIC | Facility: CLINIC | Age: 31
End: 2021-07-22

## 2021-07-22 NOTE — TELEPHONE ENCOUNTER
[facility]    [facility] Visit Information     Ed visit date: 7-   Diagnosis Description: Bilateral ovarian cysts     Abdominal pain    In Network? Yes Vanessa Lull  Discharge status: Home  Discharged with meds ? Yes  Number of ED visits to date: 1  ED Severity:3     Outreach Information    Outreach successful: No 2  Date letter mailed:covid remote  Date Finalized:7-    Care Coordination    Follow up appointment with pcp: no    Transportation issues ? NA    Value Base Outreach    Outreach type: 3 Day Outreach  Emergent necessity warranted by diagnosis: Yes  [facility] PCP: Yes  07/22/2021 12:40 PM Phone (VBI) Hope Drivygoyvon (Self) [phone] (M) Remove  Call Complete - Identified self/reason for call, line disconnected      By Abby Mast MA    07/22/2021 12:43 PM Phone (VBI) Hope Bloodgood (Self) [phone] Patito Plascencia) Remove  Left Message - LM asking for call back for ED FU    By Abby Mast MA

## 2021-08-02 ENCOUNTER — HOSPITAL ENCOUNTER (OUTPATIENT)
Dept: RADIOLOGY | Facility: HOSPITAL | Age: 31
Discharge: HOME | End: 2021-08-02
Payer: COMMERCIAL

## 2021-08-02 ENCOUNTER — OFFICE VISIT (OUTPATIENT)
Dept: FAMILY MEDICINE CLINIC | Facility: CLINIC | Age: 31
End: 2021-08-02

## 2021-08-02 VITALS
BODY MASS INDEX: 21.99 KG/M2 | HEIGHT: 60 IN | TEMPERATURE: 98 F | SYSTOLIC BLOOD PRESSURE: 104 MMHG | RESPIRATION RATE: 16 BRPM | DIASTOLIC BLOOD PRESSURE: 70 MMHG | OXYGEN SATURATION: 100 % | HEART RATE: 83 BPM | WEIGHT: 112 LBS

## 2021-08-02 DIAGNOSIS — M25.552 LEFT HIP PAIN: Primary | ICD-10-CM

## 2021-08-02 DIAGNOSIS — M25.552 LEFT HIP PAIN: ICD-10-CM

## 2021-08-02 PROCEDURE — 3008F BODY MASS INDEX DOCD: CPT | Performed by: INTERNAL MEDICINE

## 2021-08-02 PROCEDURE — 1036F TOBACCO NON-USER: CPT | Performed by: INTERNAL MEDICINE

## 2021-08-02 PROCEDURE — 3725F SCREEN DEPRESSION PERFORMED: CPT | Performed by: INTERNAL MEDICINE

## 2021-08-02 PROCEDURE — 99213 OFFICE O/P EST LOW 20 MIN: CPT | Performed by: INTERNAL MEDICINE

## 2021-08-02 PROCEDURE — 72110 X-RAY EXAM L-2 SPINE 4/>VWS: CPT

## 2021-08-02 RX ORDER — LIDOCAINE 50 MG/G
1 PATCH TOPICAL DAILY
Qty: 30 PATCH | Refills: 0 | Status: SHIPPED | OUTPATIENT
Start: 2021-08-02 | End: 2021-08-03

## 2021-08-02 RX ORDER — METHOCARBAMOL 500 MG/1
500 TABLET, FILM COATED ORAL DAILY PRN
Qty: 30 TABLET | Refills: 0 | Status: SHIPPED | OUTPATIENT
Start: 2021-08-02 | End: 2022-03-08

## 2021-08-02 NOTE — ASSESSMENT & PLAN NOTE
-symptoms of left sided hip pain, sudden onset with sharp sensation radiation to left lower extremity  -no sudden trauma reported  -on/off, relieves on its own   -most recent episode last night, has residual discomfort today in area of the left tight  -physical examination with normal ROM, no tenderness  -discussed with patient that symptoms are most likely related to sciatica of left hip  -physical therapy referral sent, exercises provided in AVS  -trial with muscle relaxant robaxin to take PRN at night ordered, as well as lidocaine patches  -lumbar x ray ordered as well to rule out other secondary causes, if negative and symptoms persists, will consider further workup as warranted

## 2021-08-02 NOTE — PROGRESS NOTES
Assessment/Plan:    Left hip pain  -symptoms of left sided hip pain, sudden onset with sharp sensation radiation to left lower extremity  -no sudden trauma reported  -on/off, relieves on its own   -most recent episode last night, has residual discomfort today in area of the left tight  -physical examination with normal ROM, no tenderness  -discussed with patient that symptoms are most likely related to sciatica of left hip  -physical therapy referral sent, exercises provided in AVS  -trial with muscle relaxant robaxin to take PRN at night ordered, as well as lidocaine patches  -lumbar x ray ordered as well to rule out other secondary causes, if negative and symptoms persists, will consider further workup as warranted       Diagnoses and all orders for this visit:    Left hip pain  -     XR spine lumbar minimum 4 views non injury; Future  -     methocarbamol (ROBAXIN) 500 mg tablet; Take 1 tablet (500 mg total) by mouth daily as needed for muscle spasms  -     Ambulatory referral to Physical Therapy; Future  -     lidocaine (LIDODERM) 5 %; Apply 1 patch topically daily Remove & Discard patch within 12 hours or as directed by MD          Subjective:      Patient ID: Bere Fox is a 31 y o  female  Case of 32 y/o female came today for evaluation of sudden onset left side back pain  States that has been occurring on/off  No recent trauma or fractures reported  Most recent episode was last night, sensation of sharp discomfort that radiated to the lower side of the leg and difficulty with ambulation at that moment  Has not taken any medications so far  The following portions of the patient's history were reviewed and updated as appropriate: allergies, current medications, past family history, past medical history, past social history, past surgical history and problem list     Review of Systems   Constitutional: Negative for fever  Eyes: Negative for visual disturbance     Respiratory: Negative for cough, shortness of breath and wheezing  Cardiovascular: Negative for chest pain, palpitations and leg swelling  Gastrointestinal: Negative for abdominal pain, diarrhea, nausea and vomiting  Musculoskeletal:        Left hip pain   Skin: Negative for color change, pallor, rash and wound  Neurological: Negative for headaches  Psychiatric/Behavioral: The patient is not nervous/anxious  Objective:      /70 (BP Location: Left arm, Patient Position: Sitting, Cuff Size: Standard)   Pulse 83   Temp 98 °F (36 7 °C) (Temporal)   Resp 16   Ht 5' (1 524 m)   Wt 50 8 kg (112 lb)   SpO2 100%   BMI 21 87 kg/m²          Physical Exam  Constitutional:       General: She is not in acute distress  Appearance: Normal appearance  She is not ill-appearing, toxic-appearing or diaphoretic  Pulmonary:      Effort: Pulmonary effort is normal    Musculoskeletal:         General: No swelling, tenderness, deformity or signs of injury  Normal range of motion  Right lower leg: No edema  Left lower leg: No edema  Comments: No difficulty ambulating, no low back or paraspinal tenderness appreciated    Skin:     General: Skin is warm  Coloration: Skin is not jaundiced or pale  Findings: No bruising, erythema, lesion or rash  Neurological:      General: No focal deficit present  Mental Status: She is alert and oriented to person, place, and time  Mental status is at baseline     Psychiatric:         Mood and Affect: Mood normal

## 2021-08-02 NOTE — PATIENT INSTRUCTIONS
Lower Back Exercises   WHAT YOU NEED TO KNOW:   Lower back exercises help heal and strengthen your back muscles to prevent another injury  Ask your healthcare provider if you need to see a physical therapist for more advanced exercises  DISCHARGE INSTRUCTIONS:   Return to the emergency department if:   · You have severe pain that prevents you from moving  Contact your healthcare provider if:   · Your pain becomes worse  · You have new pain  · You have questions or concerns about your condition or care  Do lower back exercises safely:   · Do the exercises on a mat or firm surface  (not on a bed) to support your spine and prevent low back pain  · Move slowly and smoothly  Avoid fast or jerky motions  · Breathe normally  Do not hold your breath  · Stop if you feel pain  It is normal to feel some discomfort at first  Regular exercise will help decrease your discomfort over time  Lower back exercises: Your healthcare provider may recommend that you do back exercises 10 to 30 minutes each day  He may also recommend that you do exercises 1 to 3 times each day  Ask your healthcare provider which exercises are best for you and how often to do them  · Ankle pumps:  Lie on your back  Move your foot up (with your toes pointing toward your head)  Then, move your foot down (with your toes pointing away from you)  Repeat this exercise 10 times on each side  · Heel slides:  Lie on your back  Slowly bend one leg and then straighten it  Next, bend the other leg and then straighten it  Repeat 10 times on each side  · Pelvic tilt:  Lie on your back with your knees bent and feet flat on the floor  Place your arms in a relaxed position beside your body  Tighten the muscles of your abdomen and flatten your back against the floor  Hold for 5 seconds  Repeat 5 times  · Back stretch:  Lie on your back with your hands behind your head   Bend your knees and turn the lower half of your body to one side  Hold this position for 10 seconds  Repeat 3 times on each side  · Straight leg raises:  Lie on your back with one leg straight  Bend the other knee  Tighten your abdomen and then slowly lift the straight leg up about 6 to 12 inches off the floor  Hold for 1 to 5 seconds  Lower your leg slowly  Repeat 10 times on each leg  · Knee-to-chest:  Lie on your back with your knees bent and feet flat on the floor  Pull one of your knees toward your chest and hold it there for 5 seconds  Return your leg to the starting position  Lift the other knee toward your chest and hold for 5 seconds  Do this 5 times on each side  · Cat and camel:  Place your hands and knees on the floor  Arch your back upward toward the ceiling and lower your head  Round out your spine as much as you can  Hold for 5 seconds  Lift your head upward and push your chest downward toward the floor  Hold for 5 seconds  Do 3 sets or as directed  · Wall squats:  Stand with your back against a wall  Tighten the muscles of your abdomen  Slowly lower your body until your knees are bent at a 45 degree angle  Hold this position for 5 seconds  Slowly move back up to a standing position  Repeat 10 times  · Curl up:  Lie on your back with your knees bent and feet flat on the floor  Place your hands, palms down, underneath the curve in your lower back  Next, with your elbows on the floor, lift your shoulders and chest 2 to 3 inches  Keep your head in line with your shoulders  Hold this position for 5 seconds  When you can do this exercise without pain for 10 to 15 seconds, you may add a rotation  While your shoulders and chest are lifted off the ground, turn slightly to the left and hold  Repeat on the other side  · Bird dog:  Place your hands and knees on the floor  Keep your wrists directly below your shoulders and your knees directly below your hips  Pull your belly button in toward your spine   Do not flatten or arch your back  Tighten your abdominal muscles  Raise one arm straight out so that it is aligned with your head  Next, raise the leg opposite your arm  Hold this position for 15 seconds  Lower your arm and leg slowly and change sides  Do 5 sets  © Copyright [address] 2021 Information is for End User's use only and may not be sold, redistributed or otherwise used for commercial purposes  All illustrations and images included in CareNotes® are the copyrighted property of A D A M , Inc  or [facility] [employer]by Vyas   The above information is an  only  It is not intended as medical advice for individual conditions or treatments  Talk to your doctor, nurse or pharmacist before following any medical regimen to see if it is safe and effective for you

## 2021-08-03 RX ORDER — LIDOCAINE 50 MG/G
1 PATCH TOPICAL DAILY PRN
Qty: 30 PATCH | Refills: 0 | Status: SHIPPED | OUTPATIENT
Start: 2021-08-03 | End: 2022-03-08

## 2021-08-27 ENCOUNTER — RA CDI HCC (OUTPATIENT)
Dept: OTHER | Facility: HOSPITAL | Age: 31
End: 2021-08-27

## 2021-08-27 ENCOUNTER — OFFICE VISIT (OUTPATIENT)
Dept: FAMILY MEDICINE CLINIC | Facility: CLINIC | Age: 31
End: 2021-08-27

## 2021-08-27 DIAGNOSIS — B34.9 VIRAL ILLNESS: Primary | ICD-10-CM

## 2021-08-27 PROCEDURE — 1036F TOBACCO NON-USER: CPT | Performed by: FAMILY MEDICINE

## 2021-08-27 PROCEDURE — 99213 OFFICE O/P EST LOW 20 MIN: CPT | Performed by: FAMILY MEDICINE

## 2021-08-27 RX ORDER — BENZONATATE 100 MG/1
100 CAPSULE ORAL 3 TIMES DAILY PRN
Qty: 20 CAPSULE | Refills: 0 | Status: SHIPPED | OUTPATIENT
Start: 2021-08-27 | End: 2022-03-08

## 2021-08-27 NOTE — ASSESSMENT & PLAN NOTE
Counseled that viral illnesses may take up to 7-10 days to run their course  Advised increased hydration  Salt water gargles for the throat as needed  Tyenol if headache and fever develops  Will send tessalon perles 100 mg TID prn for 10 days  Saline nasal spray for any nasal congestion  If worsening symptoms, RTC

## 2021-08-27 NOTE — PROGRESS NOTES
Virtual Brief Visit    Verification of patient location:    Patient is located in the following state in which I hold an active license [place]      Assessment/Plan:    Problem List Items Addressed This Visit        Other    Viral illness - Primary     Counseled that viral illnesses may take up to 7-10 days to run their course  Advised increased hydration  Salt water gargles for the throat as needed  Tyenol if headache and fever develops  Will send tessalon perles 100 mg TID prn for 10 days  Saline nasal spray for any nasal congestion  If worsening symptoms, RTC                Reason for visit is   Chief Complaint   Patient presents with    Virtual Brief Visit        Encounter provider [facility]    Provider located at [address] [place] [address] [phone]    Recent Visits  No visits were found meeting these conditions  Showing recent visits within past 7 days and meeting all other requirements  Today's Visits  Date Type Provider Dept   08/27/21 Office Visit [facility]   Showing today's visits and meeting all other requirements  Future Appointments  No visits were found meeting these conditions  Showing future appointments within next 150 days and meeting all other requirements       After connecting through [employer] and patient was informed that this is a secure, HIPAA-compliant platform  She agrees to proceed  , the patient was identified by name and date of birth  Navi All was informed that this is a telemedicine visit and that the visit is being conducted through [employer] and patient was informed that this is a secure, HIPAA-compliant platform  She agrees to proceed  My office door was closed  No one else was in the room  She acknowledged consent and understanding of privacy and security of the platform   The patient has agreed to participate and understands she can discontinue the visit at any time  Patient is aware this is a billable service  Subjective    Berta Antonietta Bowen is a 31 y o  female   HPI   2-3 days ago noticed a cough productive of yellow sputum and sore throat  Denies fever, ear pain, chills, CP, SOB, n/v, normal appetite  Noticed 3 dots in her left tonsils 2 days ago which still persists and is concerned for strep throat  Daughter was sick, attends , but getting better  Past Medical History:   Diagnosis Date    Anemia     iron def    History of transfusion     12/2019    Irritable bowel syndrome        Past Surgical History:   Procedure Laterality Date    COLONOSCOPY      EXAMINATION UNDER ANESTHESIA N/A 7/8/2020    Procedure: EXAM UNDER ANESTHESIA (EUA), diagnostic anoscopy with control of bleeding;  Surgeon: Harinder Romeo MD;  [facility];  Service: Colorectal    EXPLORATORY LAPAROTOMY      AK COLONOSCOPY FLX DX W/COLLJ SPEC WHEN PFRMD N/A 8/1/2018    Procedure: COLONOSCOPY;  Surgeon: Peggy Vines MD;  [facility];   Service: Colorectal    AK HEMORRHOIDOPEXY BY STAPLING N/A 6/23/2020    Procedure: Hemorrhoidectomy, internal and external,3 column;  Surgeon: Du Pantoaj MD;  [facility];  Service: Colorectal    UPPER GASTROINTESTINAL ENDOSCOPY         Current Outpatient Medications   Medication Sig Dispense Refill    ferrous gluconate (FERGON) 324 mg tablet Take 1 tablet (324 mg total) by mouth 2 (two) times a day before meals (Patient not taking: Reported on 7/20/2021)  0    lidocaine (LIDODERM) 5 % Apply 1 patch topically daily as needed (left hip pain) 30 patch 0    methocarbamol (ROBAXIN) 500 mg tablet Take 1 tablet (500 mg total) by mouth daily as needed for muscle spasms 30 tablet 0    naproxen (NAPROSYN) 500 mg tablet Take 1 tablet (500 mg total) by mouth 2 (two) times a day with meals (Patient not taking: Reported on 8/2/2021) 10 tablet 0    pantoprazole (PROTONIX) 40 mg tablet Take 1 tablet (40 mg total) by mouth daily (Patient not taking: Reported on 7/20/2021) 30 tablet 3     No current facility-administered medications for this visit  Allergies   Allergen Reactions    Latex      Condoms  Burning sensation wears normal underwear and can eat banans        Review of Systems    There were no vitals filed for this visit  I spent 20 minutes directly with the patient during this visit    [address] verbally agrees to participate in [employer]  Pt is aware that [employer] could be limited without vital signs or the ability to perform a full hands-on physical exam  Alexandra Howe Hope understands she or the provider may request at any time to terminate the video visit and request the patient to seek care or treatment in person

## 2021-08-27 NOTE — PROGRESS NOTES
[facility]  coding opportunities       Chart reviewed, no opportunity found: CHART REVIEWED, NO OPPORTUNITY FOUND                        Patients insurance company: [employer] (Medicare Advantage and Commercial)           Platelets normal

## 2021-09-06 ENCOUNTER — OFFICE VISIT (OUTPATIENT)
Dept: URGENT CARE | Age: 31
End: 2021-09-06
Payer: COMMERCIAL

## 2021-09-06 VITALS
WEIGHT: 112 LBS | OXYGEN SATURATION: 98 % | HEART RATE: 83 BPM | RESPIRATION RATE: 20 BRPM | BODY MASS INDEX: 21.14 KG/M2 | HEIGHT: 61 IN | TEMPERATURE: 98.3 F | DIASTOLIC BLOOD PRESSURE: 65 MMHG | SYSTOLIC BLOOD PRESSURE: 118 MMHG

## 2021-09-06 DIAGNOSIS — N30.00 ACUTE CYSTITIS WITHOUT HEMATURIA: Primary | ICD-10-CM

## 2021-09-06 DIAGNOSIS — R30.0 DYSURIA: ICD-10-CM

## 2021-09-06 LAB
SL AMB  POCT GLUCOSE, UA: NEGATIVE
SL AMB LEUKOCYTE ESTERASE,UA: ABNORMAL
SL AMB POCT BILIRUBIN,UA: NEGATIVE
SL AMB POCT BLOOD,UA: NEGATIVE
SL AMB POCT CLARITY,UA: CLEAR
SL AMB POCT COLOR,UA: YELLOW
SL AMB POCT KETONES,UA: NEGATIVE
SL AMB POCT NITRITE,UA: NEGATIVE
SL AMB POCT PH,UA: 5
SL AMB POCT SPECIFIC GRAVITY,UA: 1.01
SL AMB POCT URINE PROTEIN: NEGATIVE
SL AMB POCT UROBILINOGEN: 0.2

## 2021-09-06 PROCEDURE — 87086 URINE CULTURE/COLONY COUNT: CPT | Performed by: PHYSICIAN ASSISTANT

## 2021-09-06 PROCEDURE — 81002 URINALYSIS NONAUTO W/O SCOPE: CPT | Performed by: PHYSICIAN ASSISTANT

## 2021-09-06 PROCEDURE — 99213 OFFICE O/P EST LOW 20 MIN: CPT | Performed by: PHYSICIAN ASSISTANT

## 2021-09-06 RX ORDER — CEPHALEXIN 500 MG/1
500 CAPSULE ORAL EVERY 12 HOURS SCHEDULED
Qty: 14 CAPSULE | Refills: 0 | Status: SHIPPED | OUTPATIENT
Start: 2021-09-06 | End: 2021-09-13

## 2021-09-06 NOTE — PROGRESS NOTES
[email or web address]        NAME: Yo Galvin is a 31 y o  female  : 1990    MRN: [id number]  DATE: 2021  TIME: 10:08 AM    Assessment and Plan   Acute cystitis without hematuria [N30 00]  1  Acute cystitis without hematuria  cephalexin (KEFLEX) 500 mg capsule    Urine culture   2  Dysuria  POCT urine dip   Pt presents with symptoms consistent with UTI  UA with large leuk esterase and acidic urine  Will start on Keflex empirically to treat  Urine will be sent for culture and we will call if any antibiotic resistance  Pt will follow-up with her PCP in 2-3 days if symptoms are not improved and report to the ED if symptoms worsen, or new symptoms develop  Patient Instructions   Patient Instructions     Take antibiotics as prescribed  Complete the entire course  If you do not complete the entire course this may result in bacterial resistance making future infections very difficult or impossible to treat  You should never have leftover antibiotics unless your antibiotic is switched  We send all positive urine for culture, we will call you if your antibiotic needs to be changed based on culture results  If symptoms do not improve in 2-3 days, follow-up with your primary care provider  If you develop fever, chills, or worsening low back pain report to the emergency room  Urinary Tract Infection in Women   AMBULATORY CARE:   A urinary tract infection (UTI)  is caused by bacteria that get inside your urinary tract  Most bacteria that enter your urinary tract come out when you urinate  If the bacteria stay in your urinary tract, you may get an infection  Your urinary tract includes your kidneys, ureters, bladder, and urethra  Urine is made in your kidneys, and it flows from the ureters to the bladder  Urine leaves the bladder through the urethra  A UTI is more common in your lower urinary tract, which includes your bladder and urethra        Common symptoms include the following: · Urinating more often or waking from sleep to urinate    · Pain or burning when you urinate    · Pain or pressure in your lower abdomen     · Urine that smells bad    · Blood in your urine    · Leaking urine    Seek care immediately if:   · You are urinating very little or not at all  · You have a high fever with shaking chills  · You have side or back pain that gets worse  Call your doctor if:   · You have a fever  · You do not feel better after 2 days of taking antibiotics  · You are vomiting  · You have questions or concerns about your condition or care  Treatment for a UTI  may include antibiotics to treat a bacterial infection  You may also need medicines to decrease pain and burning, or decrease the urge to urinate often  If you have UTIs often (called recurrent UTIs), you may be given antibiotics to take regularly  You will be given directions for when and how to use antibiotics  The goal is to prevent UTIs but not cause antibiotic resistance by using antibiotics too often  Prevent a UTI:   · Empty your bladder often  Urinate and empty your bladder as soon as you feel the need  Do not hold your urine for long periods of time  · Wipe from front to back after you urinate or have a bowel movement  This will help prevent germs from getting into your urinary tract through your urethra  · Drink liquids as directed  Ask how much liquid to drink each day and which liquids are best for you  You may need to drink more liquids than usual to help flush out the bacteria  Do not drink alcohol, caffeine, or citrus juices  These can irritate your bladder and increase your symptoms  Your healthcare provider may recommend cranberry juice to help prevent a UTI  · Urinate after you have sex  This can help flush out bacteria passed during sex  · Do not douche or use feminine deodorants  These can change the chemical balance in your vagina  · Change sanitary pads or tampons often  This will help prevent germs from getting into your urinary tract  · Talk to your healthcare provider about your birth control method  You may need to change your method if it is increasing your risk for UTIs  · Wear cotton underwear and clothes that are loose  Tight pants and nylon underwear can trap moisture and cause bacteria to grow  · Vaginal estrogen may be recommended  This medicine helps prevent UTIs in women who have gone through menopause or are in hilda-menopause  · Do pelvic muscle exercises often  Pelvic muscle exercises may help you start and stop urinating  Strong pelvic muscles may help you empty your bladder easier  Squeeze these muscles tightly for 5 seconds like you are trying to hold back urine  Then relax for 5 seconds  Gradually work up to squeezing for 10 seconds  Do 3 sets of 15 repetitions a day, or as directed  Follow up with your healthcare provider as directed:  Write down your questions so you remember to ask them during your visits  © Copyright [address] Information is for End User's use only and may not be sold, redistributed or otherwise used for commercial purposes  All illustrations and images included in CareNotes® are the copyrighted property of A D A M , Inc  or [address] [place] [address]  The above information is an  only  It is not intended as medical advice for individual conditions or treatments  Talk to your doctor, nurse or pharmacist before following any medical regimen to see if it is safe and effective for you  Follow up with PCP in 3-5 days  Proceed to  ER if symptoms worsen  Chief Complaint     Chief Complaint   Patient presents with    Possible UTI     Pt c/o three week hx of urinary frequency and dysuria  Denies hematuria, back pain or pelvic pressure  No OTC meds taken             History of Present Illness       31year old female presents with complaints of painful urination, urgency, and frequency for 3 weeks duration  Pt denies abdominal pain, nausea, vomiting, diarrhea, fevers, chills, and back pain  She has no other concerns of complaints today  Review of Systems   Review of Systems   Constitutional: Negative for chills and fever  Gastrointestinal: Negative for diarrhea, nausea and vomiting  Genitourinary: Positive for dysuria, frequency and urgency  Negative for flank pain  Musculoskeletal: Negative for back pain           Current Medications       Current Outpatient Medications:     benzonatate (TESSALON PERLES) 100 mg capsule, Take 1 capsule (100 mg total) by mouth 3 (three) times a day as needed for cough (Patient not taking: Reported on 9/6/2021), Disp: 20 capsule, Rfl: 0    cephalexin (KEFLEX) 500 mg capsule, Take 1 capsule (500 mg total) by mouth every 12 (twelve) hours for 7 days, Disp: 14 capsule, Rfl: 0    ferrous gluconate (FERGON) 324 mg tablet, Take 1 tablet (324 mg total) by mouth 2 (two) times a day before meals (Patient not taking: Reported on 7/20/2021), Disp: , Rfl: 0    lidocaine (LIDODERM) 5 %, Apply 1 patch topically daily as needed (left hip pain) (Patient not taking: Reported on 9/6/2021), Disp: 30 patch, Rfl: 0    methocarbamol (ROBAXIN) 500 mg tablet, Take 1 tablet (500 mg total) by mouth daily as needed for muscle spasms (Patient not taking: Reported on 9/6/2021), Disp: 30 tablet, Rfl: 0    naproxen (NAPROSYN) 500 mg tablet, Take 1 tablet (500 mg total) by mouth 2 (two) times a day with meals (Patient not taking: Reported on 8/2/2021), Disp: 10 tablet, Rfl: 0    pantoprazole (PROTONIX) 40 mg tablet, Take 1 tablet (40 mg total) by mouth daily (Patient not taking: Reported on 7/20/2021), Disp: 30 tablet, Rfl: 3    Current Allergies     Allergies as of 09/06/2021 - Reviewed 09/06/2021   Allergen Reaction Noted    Latex  03/05/2020            The following portions of the patient's history were reviewed and updated as appropriate: allergies, current medications, past family history, past medical history, past social history, past surgical history and problem list      Past Medical History:   Diagnosis Date    Anemia     iron def    History of transfusion     12/2019    Irritable bowel syndrome        Past Surgical History:   Procedure Laterality Date    COLONOSCOPY      EXAMINATION UNDER ANESTHESIA N/A 7/8/2020    Procedure: EXAM UNDER ANESTHESIA (EUA), diagnostic anoscopy with control of bleeding;  Surgeon: Xander Hawthorne MD;  [facility];  Service: Colorectal    EXPLORATORY LAPAROTOMY      MT COLONOSCOPY FLX DX W/COLLJ SPEC WHEN PFRMD N/A 8/1/2018    Procedure: COLONOSCOPY;  Surgeon: Nimo Neville MD;  [facility]; Service: Colorectal    MT HEMORRHOIDOPEXY BY STAPLING N/A 6/23/2020    Procedure: Hemorrhoidectomy, internal and external,3 column;  Surgeon: Miguel Angel Martin MD;  [facility];  Service: Colorectal    UPPER GASTROINTESTINAL ENDOSCOPY         Family History   Problem Relation Age of Onset    Diabetes Mother     HIV Father     Colon cancer Neg Hx          Medications have been verified  Objective   /65   Pulse 83   Temp 98 3 °F (36 8 °C)   Resp 20   Ht 5' 1" (1 549 m)   Wt 50 8 kg (112 lb)   LMP 06/30/2021   SpO2 98%   BMI 21 16 kg/m²   Patient's last menstrual period was 06/30/2021  Physical Exam     Physical Exam  Vitals and nursing note reviewed  Constitutional:       General: She is awake  She is not in acute distress  Appearance: Normal appearance  She is well-developed and well-groomed  She is not ill-appearing, toxic-appearing or diaphoretic  HENT:      Head: Normocephalic and atraumatic  Right Ear: Hearing and external ear normal       Left Ear: Hearing and external ear normal    Eyes:      General: Lids are normal  Vision grossly intact  Gaze aligned appropriately  Cardiovascular:      Rate and Rhythm: Normal rate     Pulmonary:      Effort: Pulmonary effort is normal  Comments: Patient is speaking in full sentences with no increased respiratory effort  No audible wheezing or stridor  Abdominal:      Tenderness: There is abdominal tenderness in the suprapubic area  There is left CVA tenderness  There is no right CVA tenderness  Musculoskeletal:      Cervical back: Normal range of motion  Skin:     General: Skin is warm and dry  Neurological:      Mental Status: She is alert and oriented to person, place, and time  Coordination: Coordination is intact  Gait: Gait is intact  Psychiatric:         Attention and Perception: Attention and perception normal          Mood and Affect: Mood and affect normal          Speech: Speech normal          Behavior: Behavior normal  Behavior is cooperative  Note: Portions of this record may have been created with voice recognition software  Occasional wrong word or "sound a like" substitutions may have occurred due to the inherent limitations of voice recognition software  Please read the chart carefully and recognize, using context, where substitutions have occurred  *

## 2021-09-06 NOTE — PATIENT INSTRUCTIONS
Take antibiotics as prescribed  Complete the entire course  If you do not complete the entire course this may result in bacterial resistance making future infections very difficult or impossible to treat  You should never have leftover antibiotics unless your antibiotic is switched  We send all positive urine for culture, we will call you if your antibiotic needs to be changed based on culture results  If symptoms do not improve in 2-3 days, follow-up with your primary care provider  If you develop fever, chills, or worsening low back pain report to the emergency room  Urinary Tract Infection in Women   AMBULATORY CARE:   A urinary tract infection (UTI)  is caused by bacteria that get inside your urinary tract  Most bacteria that enter your urinary tract come out when you urinate  If the bacteria stay in your urinary tract, you may get an infection  Your urinary tract includes your kidneys, ureters, bladder, and urethra  Urine is made in your kidneys, and it flows from the ureters to the bladder  Urine leaves the bladder through the urethra  A UTI is more common in your lower urinary tract, which includes your bladder and urethra  Common symptoms include the following:   · Urinating more often or waking from sleep to urinate    · Pain or burning when you urinate    · Pain or pressure in your lower abdomen     · Urine that smells bad    · Blood in your urine    · Leaking urine    Seek care immediately if:   · You are urinating very little or not at all  · You have a high fever with shaking chills  · You have side or back pain that gets worse  Call your doctor if:   · You have a fever  · You do not feel better after 2 days of taking antibiotics  · You are vomiting  · You have questions or concerns about your condition or care  Treatment for a UTI  may include antibiotics to treat a bacterial infection   You may also need medicines to decrease pain and burning, or decrease the urge to urinate often  If you have UTIs often (called recurrent UTIs), you may be given antibiotics to take regularly  You will be given directions for when and how to use antibiotics  The goal is to prevent UTIs but not cause antibiotic resistance by using antibiotics too often  Prevent a UTI:   · Empty your bladder often  Urinate and empty your bladder as soon as you feel the need  Do not hold your urine for long periods of time  · Wipe from front to back after you urinate or have a bowel movement  This will help prevent germs from getting into your urinary tract through your urethra  · Drink liquids as directed  Ask how much liquid to drink each day and which liquids are best for you  You may need to drink more liquids than usual to help flush out the bacteria  Do not drink alcohol, caffeine, or citrus juices  These can irritate your bladder and increase your symptoms  Your healthcare provider may recommend cranberry juice to help prevent a UTI  · Urinate after you have sex  This can help flush out bacteria passed during sex  · Do not douche or use feminine deodorants  These can change the chemical balance in your vagina  · Change sanitary pads or tampons often  This will help prevent germs from getting into your urinary tract  · Talk to your healthcare provider about your birth control method  You may need to change your method if it is increasing your risk for UTIs  · Wear cotton underwear and clothes that are loose  Tight pants and nylon underwear can trap moisture and cause bacteria to grow  · Vaginal estrogen may be recommended  This medicine helps prevent UTIs in women who have gone through menopause or are in hilda-menopause  · Do pelvic muscle exercises often  Pelvic muscle exercises may help you start and stop urinating  Strong pelvic muscles may help you empty your bladder easier  Squeeze these muscles tightly for 5 seconds like you are trying to hold back urine   Then relax for 5 seconds  Gradually work up to squeezing for 10 seconds  Do 3 sets of 15 repetitions a day, or as directed  Follow up with your healthcare provider as directed:  Write down your questions so you remember to ask them during your visits  © Copyright [address] Information is for End User's use only and may not be sold, redistributed or otherwise used for commercial purposes  All illustrations and images included in CareNotes® are the copyrighted property of [employer]  or [facility] Corby Vyas   The above information is an  only  It is not intended as medical advice for individual conditions or treatments  Talk to your doctor, nurse or pharmacist before following any medical regimen to see if it is safe and effective for you

## 2021-09-07 LAB — BACTERIA UR CULT: ABNORMAL

## 2021-10-18 ENCOUNTER — OFFICE VISIT (OUTPATIENT)
Dept: FAMILY MEDICINE CLINIC | Facility: CLINIC | Age: 31
End: 2021-10-18

## 2021-10-18 VITALS
DIASTOLIC BLOOD PRESSURE: 84 MMHG | OXYGEN SATURATION: 99 % | RESPIRATION RATE: 16 BRPM | SYSTOLIC BLOOD PRESSURE: 130 MMHG | WEIGHT: 113 LBS | TEMPERATURE: 98 F | HEIGHT: 61 IN | HEART RATE: 83 BPM | BODY MASS INDEX: 21.34 KG/M2

## 2021-10-18 DIAGNOSIS — M54.6 ACUTE BILATERAL THORACIC BACK PAIN: ICD-10-CM

## 2021-10-18 DIAGNOSIS — R00.2 PALPITATIONS: Primary | ICD-10-CM

## 2021-10-18 PROCEDURE — 1036F TOBACCO NON-USER: CPT | Performed by: FAMILY MEDICINE

## 2021-10-18 PROCEDURE — 3008F BODY MASS INDEX DOCD: CPT | Performed by: FAMILY MEDICINE

## 2021-10-18 PROCEDURE — 3725F SCREEN DEPRESSION PERFORMED: CPT | Performed by: FAMILY MEDICINE

## 2021-10-18 PROCEDURE — 99213 OFFICE O/P EST LOW 20 MIN: CPT | Performed by: FAMILY MEDICINE

## 2021-11-08 ENCOUNTER — TELEPHONE (OUTPATIENT)
Dept: FAMILY MEDICINE CLINIC | Facility: CLINIC | Age: 31
End: 2021-11-08

## 2021-11-15 ENCOUNTER — TELEPHONE (OUTPATIENT)
Dept: FAMILY MEDICINE CLINIC | Facility: CLINIC | Age: 31
End: 2021-11-15

## 2021-12-10 ENCOUNTER — OFFICE VISIT (OUTPATIENT)
Dept: URGENT CARE | Facility: MEDICAL CENTER | Age: 31
End: 2021-12-10
Payer: COMMERCIAL

## 2021-12-10 VITALS
BODY MASS INDEX: 21.35 KG/M2 | TEMPERATURE: 98.4 F | HEART RATE: 84 BPM | SYSTOLIC BLOOD PRESSURE: 121 MMHG | DIASTOLIC BLOOD PRESSURE: 58 MMHG | WEIGHT: 113 LBS | RESPIRATION RATE: 18 BRPM | OXYGEN SATURATION: 100 %

## 2021-12-10 DIAGNOSIS — R30.0 DYSURIA: Primary | ICD-10-CM

## 2021-12-10 LAB
SL AMB  POCT GLUCOSE, UA: ABNORMAL
SL AMB LEUKOCYTE ESTERASE,UA: ABNORMAL
SL AMB POCT BILIRUBIN,UA: ABNORMAL
SL AMB POCT BLOOD,UA: ABNORMAL
SL AMB POCT CLARITY,UA: CLEAR
SL AMB POCT COLOR,UA: YELLOW
SL AMB POCT KETONES,UA: ABNORMAL
SL AMB POCT NITRITE,UA: ABNORMAL
SL AMB POCT PH,UA: 6
SL AMB POCT SPECIFIC GRAVITY,UA: 1.02
SL AMB POCT URINE PROTEIN: ABNORMAL
SL AMB POCT UROBILINOGEN: 0.2

## 2021-12-10 PROCEDURE — 87086 URINE CULTURE/COLONY COUNT: CPT | Performed by: PHYSICIAN ASSISTANT

## 2021-12-10 PROCEDURE — 99213 OFFICE O/P EST LOW 20 MIN: CPT | Performed by: PHYSICIAN ASSISTANT

## 2021-12-10 RX ORDER — SULFAMETHOXAZOLE AND TRIMETHOPRIM 800; 160 MG/1; MG/1
1 TABLET ORAL EVERY 12 HOURS SCHEDULED
Qty: 10 TABLET | Refills: 0 | Status: SHIPPED | OUTPATIENT
Start: 2021-12-10 | End: 2021-12-15

## 2021-12-10 RX ORDER — PHENAZOPYRIDINE HYDROCHLORIDE 200 MG/1
200 TABLET, FILM COATED ORAL
Qty: 10 TABLET | Refills: 0 | Status: SHIPPED | OUTPATIENT
Start: 2021-12-10 | End: 2022-03-08

## 2021-12-11 LAB — BACTERIA UR CULT: NORMAL

## 2021-12-17 ENCOUNTER — APPOINTMENT (EMERGENCY)
Dept: CT IMAGING | Facility: HOSPITAL | Age: 31
End: 2021-12-17
Payer: COMMERCIAL

## 2021-12-17 ENCOUNTER — HOSPITAL ENCOUNTER (EMERGENCY)
Facility: HOSPITAL | Age: 31
Discharge: HOME | End: 2021-12-17
Attending: EMERGENCY MEDICINE | Admitting: EMERGENCY MEDICINE
Payer: COMMERCIAL

## 2021-12-17 VITALS
OXYGEN SATURATION: 98 % | DIASTOLIC BLOOD PRESSURE: 61 MMHG | RESPIRATION RATE: 16 BRPM | HEART RATE: 105 BPM | BODY MASS INDEX: 21.71 KG/M2 | WEIGHT: 115 LBS | SYSTOLIC BLOOD PRESSURE: 104 MMHG | HEIGHT: 61 IN | TEMPERATURE: 99.8 F

## 2021-12-17 DIAGNOSIS — N83.8 OVARIAN MASS, LEFT: ICD-10-CM

## 2021-12-17 DIAGNOSIS — K52.9 GASTROENTERITIS: Primary | ICD-10-CM

## 2021-12-17 LAB
ALBUMIN SERPL BCP-MCNC: 4.3 G/DL (ref 3.5–5)
ALP SERPL-CCNC: 114 U/L (ref 46–116)
ALT SERPL W P-5'-P-CCNC: 71 U/L (ref 12–78)
ANION GAP SERPL CALCULATED.3IONS-SCNC: 12 MMOL/L (ref 4–13)
AST SERPL W P-5'-P-CCNC: 46 U/L (ref 5–45)
BACTERIA UR QL AUTO: ABNORMAL /HPF
BASOPHILS # BLD MANUAL: 0 THOUSAND/UL (ref 0–0.1)
BASOPHILS NFR MAR MANUAL: 0 % (ref 0–1)
BILIRUB DIRECT SERPL-MCNC: 0.17 MG/DL (ref 0–0.2)
BILIRUB SERPL-MCNC: 1.31 MG/DL (ref 0.2–1)
BILIRUB UR QL STRIP: NEGATIVE
BUN SERPL-MCNC: 17 MG/DL (ref 5–25)
CALCIUM SERPL-MCNC: 9.4 MG/DL (ref 8.3–10.1)
CHLORIDE SERPL-SCNC: 102 MMOL/L (ref 100–108)
CLARITY UR: CLEAR
CO2 SERPL-SCNC: 24 MMOL/L (ref 21–32)
COLOR UR: YELLOW
CREAT SERPL-MCNC: 1.11 MG/DL (ref 0.6–1.3)
EOSINOPHIL # BLD MANUAL: 0 THOUSAND/UL (ref 0–0.4)
EOSINOPHIL NFR BLD MANUAL: 0 % (ref 0–6)
ERYTHROCYTE [DISTWIDTH] IN BLOOD BY AUTOMATED COUNT: 14.7 % (ref 11.6–15.1)
EXT PREG TEST URINE: NEGATIVE
EXT. CONTROL ED NAV: NORMAL
GFR SERPL CREATININE-BSD FRML MDRD: 66 ML/MIN/1.73SQ M
GLUCOSE SERPL-MCNC: 106 MG/DL (ref 65–140)
GLUCOSE SERPL-MCNC: 128 MG/DL (ref 65–140)
GLUCOSE UR STRIP-MCNC: NEGATIVE MG/DL
HCG SERPL QL: NEGATIVE
HCT VFR BLD AUTO: 47.8 % (ref 34.8–46.1)
HGB BLD-MCNC: 15.4 G/DL (ref 11.5–15.4)
HGB UR QL STRIP.AUTO: ABNORMAL
KETONES UR STRIP-MCNC: ABNORMAL MG/DL
LEUKOCYTE ESTERASE UR QL STRIP: ABNORMAL
LIPASE SERPL-CCNC: 80 U/L (ref 73–393)
LYMPHOCYTES # BLD AUTO: 0.69 THOUSAND/UL (ref 0.6–4.47)
LYMPHOCYTES # BLD AUTO: 5 % (ref 14–44)
MCH RBC QN AUTO: 26.3 PG (ref 26.8–34.3)
MCHC RBC AUTO-ENTMCNC: 32.2 G/DL (ref 31.4–37.4)
MCV RBC AUTO: 82 FL (ref 82–98)
MONOCYTES # BLD AUTO: 0.42 THOUSAND/UL (ref 0–1.22)
MONOCYTES NFR BLD: 3 % (ref 4–12)
MUCOUS THREADS UR QL AUTO: ABNORMAL
NEUTROPHILS # BLD MANUAL: 12.74 THOUSAND/UL (ref 1.85–7.62)
NEUTS BAND NFR BLD MANUAL: 2 % (ref 0–8)
NEUTS SEG NFR BLD AUTO: 90 % (ref 43–75)
NITRITE UR QL STRIP: NEGATIVE
NON-SQ EPI CELLS URNS QL MICRO: ABNORMAL /HPF
PH UR STRIP.AUTO: 5.5 [PH] (ref 4.5–8)
PLATELET # BLD AUTO: 230 THOUSANDS/UL (ref 149–390)
PLATELET BLD QL SMEAR: ADEQUATE
PMV BLD AUTO: 10.3 FL (ref 8.9–12.7)
POTASSIUM SERPL-SCNC: 4.1 MMOL/L (ref 3.5–5.3)
PROT SERPL-MCNC: 9.1 G/DL (ref 6.4–8.2)
PROT UR STRIP-MCNC: ABNORMAL MG/DL
RBC # BLD AUTO: 5.86 MILLION/UL (ref 3.81–5.12)
RBC #/AREA URNS AUTO: ABNORMAL /HPF
RBC MORPH BLD: NORMAL
SODIUM SERPL-SCNC: 138 MMOL/L (ref 136–145)
SP GR UR STRIP.AUTO: >=1.03 (ref 1–1.03)
UROBILINOGEN UR QL STRIP.AUTO: 0.2 E.U./DL
WBC # BLD AUTO: 13.85 THOUSAND/UL (ref 4.31–10.16)
WBC #/AREA URNS AUTO: ABNORMAL /HPF

## 2021-12-17 PROCEDURE — 96361 HYDRATE IV INFUSION ADD-ON: CPT

## 2021-12-17 PROCEDURE — 99284 EMERGENCY DEPT VISIT MOD MDM: CPT | Performed by: EMERGENCY MEDICINE

## 2021-12-17 PROCEDURE — 99284 EMERGENCY DEPT VISIT MOD MDM: CPT

## 2021-12-17 PROCEDURE — 83690 ASSAY OF LIPASE: CPT | Performed by: EMERGENCY MEDICINE

## 2021-12-17 PROCEDURE — 85025 COMPLETE CBC W/AUTO DIFF WBC: CPT | Performed by: EMERGENCY MEDICINE

## 2021-12-17 PROCEDURE — 81001 URINALYSIS AUTO W/SCOPE: CPT

## 2021-12-17 PROCEDURE — 85007 BL SMEAR W/DIFF WBC COUNT: CPT | Performed by: EMERGENCY MEDICINE

## 2021-12-17 PROCEDURE — 80048 BASIC METABOLIC PNL TOTAL CA: CPT | Performed by: EMERGENCY MEDICINE

## 2021-12-17 PROCEDURE — 81025 URINE PREGNANCY TEST: CPT | Performed by: EMERGENCY MEDICINE

## 2021-12-17 PROCEDURE — 96374 THER/PROPH/DIAG INJ IV PUSH: CPT

## 2021-12-17 PROCEDURE — 84703 CHORIONIC GONADOTROPIN ASSAY: CPT | Performed by: EMERGENCY MEDICINE

## 2021-12-17 PROCEDURE — 82948 REAGENT STRIP/BLOOD GLUCOSE: CPT

## 2021-12-17 PROCEDURE — 74177 CT ABD & PELVIS W/CONTRAST: CPT

## 2021-12-17 PROCEDURE — 85027 COMPLETE CBC AUTOMATED: CPT | Performed by: EMERGENCY MEDICINE

## 2021-12-17 PROCEDURE — 36415 COLL VENOUS BLD VENIPUNCTURE: CPT | Performed by: EMERGENCY MEDICINE

## 2021-12-17 PROCEDURE — 80076 HEPATIC FUNCTION PANEL: CPT | Performed by: EMERGENCY MEDICINE

## 2021-12-17 PROCEDURE — 93005 ELECTROCARDIOGRAM TRACING: CPT | Performed by: PHYSICIAN ASSISTANT

## 2021-12-17 PROCEDURE — G1004 CDSM NDSC: HCPCS

## 2021-12-17 RX ORDER — ONDANSETRON 4 MG/1
4 TABLET, ORALLY DISINTEGRATING ORAL EVERY 8 HOURS PRN
Qty: 10 TABLET | Refills: 0 | Status: SHIPPED | OUTPATIENT
Start: 2021-12-17 | End: 2021-12-17 | Stop reason: SDUPTHER

## 2021-12-17 RX ORDER — ONDANSETRON 2 MG/ML
4 INJECTION INTRAMUSCULAR; INTRAVENOUS ONCE
Status: COMPLETED | OUTPATIENT
Start: 2021-12-17 | End: 2021-12-17

## 2021-12-17 RX ORDER — ACETAMINOPHEN 325 MG/1
975 TABLET ORAL ONCE
Status: COMPLETED | OUTPATIENT
Start: 2021-12-17 | End: 2021-12-17

## 2021-12-17 RX ORDER — ONDANSETRON 4 MG/1
4 TABLET, ORALLY DISINTEGRATING ORAL EVERY 8 HOURS PRN
Qty: 10 TABLET | Refills: 0 | Status: SHIPPED | OUTPATIENT
Start: 2021-12-17 | End: 2022-04-27 | Stop reason: ALTCHOICE

## 2021-12-17 RX ORDER — DIPHENOXYLATE HCL/ATROPINE 2.5-.025/5
5 LIQUID (ML) ORAL 4 TIMES DAILY PRN
Qty: 60 ML | Refills: 0 | Status: SHIPPED | OUTPATIENT
Start: 2021-12-17 | End: 2022-03-08

## 2021-12-17 RX ORDER — DIPHENOXYLATE HCL/ATROPINE 2.5-.025/5
5 LIQUID (ML) ORAL 4 TIMES DAILY PRN
Qty: 60 ML | Refills: 0 | Status: SHIPPED | OUTPATIENT
Start: 2021-12-17 | End: 2021-12-17 | Stop reason: SDUPTHER

## 2021-12-17 RX ADMIN — ACETAMINOPHEN 975 MG: 325 TABLET, FILM COATED ORAL at 16:26

## 2021-12-17 RX ADMIN — SODIUM CHLORIDE, SODIUM LACTATE, POTASSIUM CHLORIDE, AND CALCIUM CHLORIDE 1000 ML: .6; .31; .03; .02 INJECTION, SOLUTION INTRAVENOUS at 14:43

## 2021-12-17 RX ADMIN — IOHEXOL 100 ML: 350 INJECTION, SOLUTION INTRAVENOUS at 17:00

## 2021-12-17 RX ADMIN — ONDANSETRON 4 MG: 2 INJECTION INTRAMUSCULAR; INTRAVENOUS at 14:45

## 2021-12-17 RX ADMIN — SODIUM CHLORIDE, SODIUM LACTATE, POTASSIUM CHLORIDE, AND CALCIUM CHLORIDE 1000 ML: .6; .31; .03; .02 INJECTION, SOLUTION INTRAVENOUS at 17:22

## 2021-12-18 LAB
ATRIAL RATE: 113 BPM
P AXIS: 69 DEGREES
PR INTERVAL: 132 MS
QRS AXIS: 77 DEGREES
QRSD INTERVAL: 74 MS
QT INTERVAL: 336 MS
QTC INTERVAL: 460 MS
T WAVE AXIS: 24 DEGREES
VENTRICULAR RATE: 113 BPM

## 2021-12-18 PROCEDURE — 93010 ELECTROCARDIOGRAM REPORT: CPT | Performed by: INTERNAL MEDICINE

## 2022-01-24 ENCOUNTER — APPOINTMENT (OUTPATIENT)
Dept: LAB | Facility: MEDICAL CENTER | Age: 32
End: 2022-01-24
Payer: COMMERCIAL

## 2022-01-24 DIAGNOSIS — R19.09 GROIN SWELLING: ICD-10-CM

## 2022-01-24 LAB
CANCER AG125 SERPL-ACNC: 9 U/ML (ref 0–30)
CEA SERPL-MCNC: 0.8 NG/ML (ref 0–3)

## 2022-01-24 PROCEDURE — 36415 COLL VENOUS BLD VENIPUNCTURE: CPT

## 2022-01-24 PROCEDURE — 82378 CARCINOEMBRYONIC ANTIGEN: CPT

## 2022-01-24 PROCEDURE — 86304 IMMUNOASSAY TUMOR CA 125: CPT

## 2022-02-01 ENCOUNTER — APPOINTMENT (OUTPATIENT)
Dept: LAB | Facility: MEDICAL CENTER | Age: 32
End: 2022-02-01
Payer: COMMERCIAL

## 2022-02-01 DIAGNOSIS — Z32.00 POSSIBLE PREGNANCY: ICD-10-CM

## 2022-02-01 DIAGNOSIS — R00.2 PALPITATIONS: ICD-10-CM

## 2022-02-01 LAB
ALBUMIN SERPL BCP-MCNC: 3.6 G/DL (ref 3.5–5)
ALP SERPL-CCNC: 109 U/L (ref 46–116)
ALT SERPL W P-5'-P-CCNC: 47 U/L (ref 12–78)
ANION GAP SERPL CALCULATED.3IONS-SCNC: 4 MMOL/L (ref 4–13)
AST SERPL W P-5'-P-CCNC: 29 U/L (ref 5–45)
B-HCG SERPL-ACNC: <2 MIU/ML
BASOPHILS # BLD AUTO: 0.08 THOUSANDS/ΜL (ref 0–0.1)
BASOPHILS NFR BLD AUTO: 1 % (ref 0–1)
BILIRUB SERPL-MCNC: 1.07 MG/DL (ref 0.2–1)
BUN SERPL-MCNC: 15 MG/DL (ref 5–25)
CALCIUM SERPL-MCNC: 9.4 MG/DL (ref 8.3–10.1)
CHLORIDE SERPL-SCNC: 106 MMOL/L (ref 100–108)
CO2 SERPL-SCNC: 29 MMOL/L (ref 21–32)
CREAT SERPL-MCNC: 0.79 MG/DL (ref 0.6–1.3)
EOSINOPHIL # BLD AUTO: 0.17 THOUSAND/ΜL (ref 0–0.61)
EOSINOPHIL NFR BLD AUTO: 2 % (ref 0–6)
ERYTHROCYTE [DISTWIDTH] IN BLOOD BY AUTOMATED COUNT: 15.9 % (ref 11.6–15.1)
GFR SERPL CREATININE-BSD FRML MDRD: 99 ML/MIN/1.73SQ M
GLUCOSE SERPL-MCNC: 90 MG/DL (ref 65–140)
HCT VFR BLD AUTO: 44.1 % (ref 34.8–46.1)
HGB BLD-MCNC: 13.6 G/DL (ref 11.5–15.4)
IMM GRANULOCYTES # BLD AUTO: 0.04 THOUSAND/UL (ref 0–0.2)
IMM GRANULOCYTES NFR BLD AUTO: 0 % (ref 0–2)
LYMPHOCYTES # BLD AUTO: 2.72 THOUSANDS/ΜL (ref 0.6–4.47)
LYMPHOCYTES NFR BLD AUTO: 27 % (ref 14–44)
MCH RBC QN AUTO: 26.3 PG (ref 26.8–34.3)
MCHC RBC AUTO-ENTMCNC: 30.8 G/DL (ref 31.4–37.4)
MCV RBC AUTO: 85 FL (ref 82–98)
MONOCYTES # BLD AUTO: 0.87 THOUSAND/ΜL (ref 0.17–1.22)
MONOCYTES NFR BLD AUTO: 9 % (ref 4–12)
NEUTROPHILS # BLD AUTO: 6.23 THOUSANDS/ΜL (ref 1.85–7.62)
NEUTS SEG NFR BLD AUTO: 61 % (ref 43–75)
NRBC BLD AUTO-RTO: 0 /100 WBCS
PLATELET # BLD AUTO: 306 THOUSANDS/UL (ref 149–390)
PMV BLD AUTO: 10.8 FL (ref 8.9–12.7)
POTASSIUM SERPL-SCNC: 3.6 MMOL/L (ref 3.5–5.3)
PROT SERPL-MCNC: 8 G/DL (ref 6.4–8.2)
RBC # BLD AUTO: 5.18 MILLION/UL (ref 3.81–5.12)
SODIUM SERPL-SCNC: 139 MMOL/L (ref 136–145)
TSH SERPL DL<=0.05 MIU/L-ACNC: 1.67 UIU/ML (ref 0.36–3.74)
WBC # BLD AUTO: 10.11 THOUSAND/UL (ref 4.31–10.16)

## 2022-02-01 PROCEDURE — 36415 COLL VENOUS BLD VENIPUNCTURE: CPT

## 2022-02-01 PROCEDURE — 85025 COMPLETE CBC W/AUTO DIFF WBC: CPT

## 2022-02-01 PROCEDURE — 84443 ASSAY THYROID STIM HORMONE: CPT

## 2022-02-01 PROCEDURE — 84702 CHORIONIC GONADOTROPIN TEST: CPT

## 2022-02-01 PROCEDURE — 80053 COMPREHEN METABOLIC PANEL: CPT

## 2022-02-10 ENCOUNTER — TELEPHONE (OUTPATIENT)
Dept: HEMATOLOGY ONCOLOGY | Facility: CLINIC | Age: 32
End: 2022-02-10

## 2022-02-10 NOTE — TELEPHONE ENCOUNTER
New Patient Intake Form   Patient Details:    Tarun Camara  1990  [id number]    Appointment Information   Who is calling to schedule? Patient   If not self, what is the caller's name? Please put name of [facility] nurse as well  Referring provider Dr Vilchis    What is the diagnosis? Ovarian Cyst      Is there a confirmed tissue diagnosis? No   Is there a biopsy ordered or pending? Please specify dates   n/a     Is patient aware of diagnosis? Yes   Have you had any imaging or labs done? If yes, where? (If imaging done outside of [facility], please remind patient to bring a disk ) Yes     Transvaginal US     01/24       If imaging done at outside facility, did you instruct patient to obtain discs and bring to visit? Yes    Have you been seen by another Oncologist/Hematologist?  If so, who and where? no   Are the records in [facility] or Care Everywhere? yes   Are records needed from an outside facility? Yes    We have received records and they are scanned into patient's chart    If yes, Name of facility, city and state where facility is located  Did you instruct patient to have records faxed to [facility] and provide rightx number? [facility]    [employer]   Is the patient willing to be seen by another provider?   (This is for breast patients only) n/a   Miscellaneous Information: appt made for 02/21

## 2022-02-18 ENCOUNTER — TELEPHONE (OUTPATIENT)
Dept: GYNECOLOGIC ONCOLOGY | Facility: CLINIC | Age: 32
End: 2022-02-18

## 2022-02-18 NOTE — TELEPHONE ENCOUNTER
 Hello, can I please speak to (patient name) this is (enter your name here) calling from [facility]) to remind you of your appointment on (date and time) at (location)  I am calling to review our no-show/cancelation policy and complete your COVID screening questions  Do you have a few minutes? We ask that you come at least 15 minutes early for your appointment to complete all paperwork, if you are 20 minutes late for your appointment, we may need to reschedule you  We require at least 24-hour notice for cancelations and if you miss your appointment 3 times, we may unfortunately not be able to reschedule your future visit  Considering the current events related to the COVID-19 virus and in being proactive and making sure we are keeping our patients, their families and staff safe, we are screening prior to all patient appointments      1  Are you currently experiencing any symptoms of fever, cough, shortness of breath, chills, repeated shaking with chills, muscle pain, headache, sore throat, or new loss of taste/smell? ? Yes - offer a video visit with the provider    ? No - continue to the next question     2  Have you been tested for COVID-19 within the past 5 days? ? Yes (positive) - schedule telemedicine   ? Yes (negative) - continue with visit   ? Yes (awaiting results - no symptoms) - notify provider to determine in person vs  telehealth vs  reschedule   o Patient may have been tested for a surgical procedure   ? Yes (awaiting results - has symptoms) - schedule telemedicine    ?  No - continue with visit   answer No to All questions

## 2022-02-21 ENCOUNTER — CONSULT (OUTPATIENT)
Dept: GYNECOLOGIC ONCOLOGY | Facility: CLINIC | Age: 32
End: 2022-02-21
Payer: COMMERCIAL

## 2022-02-21 VITALS
TEMPERATURE: 97.9 F | HEIGHT: 61 IN | OXYGEN SATURATION: 99 % | DIASTOLIC BLOOD PRESSURE: 90 MMHG | SYSTOLIC BLOOD PRESSURE: 134 MMHG | WEIGHT: 119 LBS | RESPIRATION RATE: 17 BRPM | BODY MASS INDEX: 22.47 KG/M2 | HEART RATE: 95 BPM

## 2022-02-21 DIAGNOSIS — N83.202 CYST OF LEFT OVARY: Primary | ICD-10-CM

## 2022-02-21 PROCEDURE — 3008F BODY MASS INDEX DOCD: CPT | Performed by: OBSTETRICS & GYNECOLOGY

## 2022-02-21 PROCEDURE — 1036F TOBACCO NON-USER: CPT | Performed by: OBSTETRICS & GYNECOLOGY

## 2022-02-21 PROCEDURE — 99205 OFFICE O/P NEW HI 60 MIN: CPT | Performed by: OBSTETRICS & GYNECOLOGY

## 2022-02-21 RX ORDER — HEPARIN SODIUM 5000 [USP'U]/ML
5000 INJECTION, SOLUTION INTRAVENOUS; SUBCUTANEOUS
Status: CANCELLED | OUTPATIENT
Start: 2022-03-26 | End: 2022-03-27

## 2022-02-21 RX ORDER — CELECOXIB 200 MG/1
200 CAPSULE ORAL ONCE
Status: CANCELLED | OUTPATIENT
Start: 2022-03-25 | End: 2022-02-21

## 2022-02-21 RX ORDER — CEFAZOLIN SODIUM 1 G/50ML
1000 SOLUTION INTRAVENOUS ONCE
Status: CANCELLED | OUTPATIENT
Start: 2022-03-25 | End: 2022-02-21

## 2022-02-21 RX ORDER — GABAPENTIN 100 MG/1
100 CAPSULE ORAL ONCE
Status: CANCELLED | OUTPATIENT
Start: 2022-03-25 | End: 2022-02-21

## 2022-02-21 RX ORDER — ACETAMINOPHEN 325 MG/1
975 TABLET ORAL ONCE
Status: CANCELLED | OUTPATIENT
Start: 2022-03-25 | End: 2022-02-21

## 2022-02-21 NOTE — PROGRESS NOTES
Assessment/Plan:    Problem List Items Addressed This Visit        Endocrine    Cyst of left ovary - Primary     Patient is a new diagnosis of persistent left ovarian cyst consistent with a dermoid  We discussed treatment options and have recommended ovarian cystectomy  We have discussed open verses laparoscopic surgery  Due to the patient's significant surgical history we have recommended open ovarian cystectomy  We discussed with the patient risks and benefits of the procedure including bleeding requiring transfusion infection and damage to local structures including bowel or bladder  The patient understands and accepts the risks of surgery and has signed an informed consent  She understands that she is at higher risk for bowel injury due to the prior surgeries  Preoperative chest x-ray EKG routine blood work will be performed  A bowel prep will be recommended with antibiotics due to the high risk of perforation  Relevant Orders    Case request operating room: LAPAROTOMY EXPLORATORY left ovarian cystectomy (Completed)    Type and screen    Comprehensive metabolic panel    CBC and differential    EKG 12 lead    XR chest pa & lateral              CHIEF COMPLAINT:  Dermoid cyst history of significant adhesions in the abdomen        Patient ID: Indy Abdi is a 27 y o  female  Patient is very pleasant 54-year-old female seen in consultation from Dr Deb Castro regarding evaluation and management of ovarian dermoid  Patient is very pleasant 54-year-old  0 with history of Patient was in her usual state of health until she developed abdominal pain and went to the emergency department in 2021  The patient was experiencing viral gastroenteritis and a CT scan was ordered  The results of the following      ABDOMEN     LOWER CHEST:  No clinically significant abnormality identified in the visualized lower chest      LIVER/BILIARY TREE:  Unremarkable      GALLBLADDER:  No calcified gallstones  No pericholecystic inflammatory change      SPLEEN:  Unremarkable      PANCREAS:  Unremarkable      ADRENAL GLANDS:  Unremarkable      KIDNEYS/URETERS:  Unremarkable  No hydronephrosis      STOMACH AND BOWEL:  Unremarkable      APPENDIX:  No findings to suggest appendicitis      ABDOMINOPELVIC CAVITY:  Small pelvic ascites  No pneumoperitoneum  No lymphadenopathy      VESSELS:  Unremarkable for patient's age      PELVIS     REPRODUCTIVE ORGANS:  3 3 cm left ovarian mass  Follow-up pelvic ultrasound is recommended for further evaluation      URINARY BLADDER:  Unremarkable      ABDOMINAL WALL/INGUINAL REGIONS:  Unremarkable      OSSEOUS STRUCTURES:  No acute fracture or destructive osseous lesion      IMPRESSION:     3 3 cm left ovarian mass  Follow-up pelvic ultrasound is recommended for further evaluation  A follow-up ultrasound was performed revealing a 5 cm left ovarian mass with calcifications consistent with a dermoid  The patient has a history of bowel related issues associated with prematurity when she was an infant  She underwent abdominal surgery at that time and has been functioning normally since then  The patient has undergone gynecologic surgery in the past 5 years and has had significant adhesions  The patient was therefore referred to us for surgical management  Patient underwent blood work including a normal CEA,  and hCG of less than 2  CBC and CPM P were unremarkable with the exception of some minor anemia  Patient reports positive urine pregnancy test in late December shortly before the serum pregnancy test was negative  She has had a normal   Since that time  She has no other issues  Today, the patient is doing well  She denies significant abdominal pain, pelvic pain, nausea, vomiting, constipation, diarrhea, fevers, chills, or vaginal bleeding        Review of Systems    Current Outpatient Medications   Medication Sig Dispense Refill    benzonatate (TESSALON PERLES) 100 mg capsule Take 1 capsule (100 mg total) by mouth 3 (three) times a day as needed for cough (Patient not taking: Reported on 9/6/2021) 20 capsule 0    diphenoxylate-atropine (LOMOTIL) 2 5-0 025 mg/5 mL liquid Take 5 mL by mouth 4 (four) times a day as needed for diarrhea 60 mL 0    ferrous gluconate (FERGON) 324 mg tablet Take 1 tablet (324 mg total) by mouth 2 (two) times a day before meals (Patient not taking: Reported on 7/20/2021)  0    lidocaine (LIDODERM) 5 % Apply 1 patch topically daily as needed (left hip pain) (Patient not taking: Reported on 9/6/2021) 30 patch 0    methocarbamol (ROBAXIN) 500 mg tablet Take 1 tablet (500 mg total) by mouth daily as needed for muscle spasms (Patient not taking: Reported on 9/6/2021) 30 tablet 0    naproxen (NAPROSYN) 500 mg tablet Take 1 tablet (500 mg total) by mouth 2 (two) times a day with meals (Patient not taking: Reported on 8/2/2021) 10 tablet 0    ondansetron (ZOFRAN-ODT) 4 mg disintegrating tablet Take 1 tablet (4 mg total) by mouth every 8 (eight) hours as needed for nausea or vomiting 10 tablet 0    pantoprazole (PROTONIX) 40 mg tablet Take 1 tablet (40 mg total) by mouth daily (Patient not taking: Reported on 7/20/2021) 30 tablet 3    phenazopyridine (PYRIDIUM) 200 mg tablet Take 1 tablet (200 mg total) by mouth 3 (three) times a day with meals 10 tablet 0     No current facility-administered medications for this visit         Allergies   Allergen Reactions    Latex      Condoms  Burning sensation wears normal underwear and can eat banans        Past Medical History:   Diagnosis Date    Anemia     iron def    History of transfusion     12/2019    Irritable bowel syndrome        Past Surgical History:   Procedure Laterality Date    COLONOSCOPY      EXAMINATION UNDER ANESTHESIA N/A 7/8/2020    Procedure: EXAM UNDER ANESTHESIA (EUA), diagnostic anoscopy with control of bleeding;  Surgeon: Sera Ivey MD;  Location:  MAIN OR;  Service: Colorectal    EXPLORATORY LAPAROTOMY      AR COLONOSCOPY FLX DX W/COLLJ SPEC WHEN PFRMD N/A 8/1/2018    Procedure: COLONOSCOPY;  Surgeon: Teddy Grossman MD;  [facility]: [facility]; Service: Colorectal    AR HEMORRHOIDOPEXY BY STAPLING N/A 6/23/2020    Procedure: Hemorrhoidectomy, internal and external,3 column;  Surgeon: Yamileth Delgado MD;  Location: [facility];  Service: Colorectal    UPPER GASTROINTESTINAL ENDOSCOPY         OB History    No obstetric history on file  Family History   Problem Relation Age of Onset    Diabetes Mother     HIV Father     Colon cancer Neg Hx        The following portions of the patient's history were reviewed and updated as appropriate: allergies, current medications, past medical history, past social history, past surgical history and problem list       Objective:    Blood pressure 134/90, pulse 95, temperature 97 9 °F (36 6 °C), temperature source Temporal, resp  rate 17, height 5' 1" (1 549 m), weight 54 kg (119 lb), SpO2 99 %  Body mass index is 22 48 kg/m²  Physical Exam  Constitutional:       Appearance: She is well-developed  HENT:      Head: Normocephalic and atraumatic  Eyes:      Pupils: Pupils are equal, round, and reactive to light  Cardiovascular:      Rate and Rhythm: Normal rate and regular rhythm  Heart sounds: Normal heart sounds  Pulmonary:      Effort: Pulmonary effort is normal  No respiratory distress  Breath sounds: Normal breath sounds  Chest:   Breasts:      Right: No supraclavicular adenopathy  Left: No supraclavicular adenopathy  Abdominal:      General: Bowel sounds are normal  There is no distension  Palpations: Abdomen is soft  Abdomen is not rigid  Tenderness: There is no abdominal tenderness  There is no guarding or rebound     Genitourinary:     Comments: -Normal external female genitalia, normal Bartholin's and Basile's glands                  -Normal midline urethral meatus  No lesions notes                  -Bladder without fullness mass or tenderness                  -Vagina without lesion or discharge No significant cystocele or rectocele noted                  -Cervix normal appearing without visible lesions                  -Uterus with normal contour, mobility  No tenderness,                  -Adnexae without  mass or tenderness                  - Anus without fissure of lesion    Musculoskeletal:         General: Normal range of motion  Cervical back: Normal range of motion and neck supple  Lymphadenopathy:      Cervical: No cervical adenopathy  Upper Body:      Right upper body: No supraclavicular adenopathy  Left upper body: No supraclavicular adenopathy  Skin:     General: Skin is warm and dry  Neurological:      Mental Status: She is alert and oriented to person, place, and time     Psychiatric:         Behavior: Behavior normal            Lab Results   Component Value Date     9 0 01/24/2022     Lab Results   Component Value Date    WBC 10 11 02/01/2022    HGB 13 6 02/01/2022    HCT 44 1 02/01/2022    MCV 85 02/01/2022     02/01/2022     Lab Results   Component Value Date     05/27/2015    K 3 6 02/01/2022     02/01/2022    CO2 29 02/01/2022    ANIONGAP 7 05/27/2015    BUN 15 02/01/2022    CREATININE 0 79 02/01/2022    GLUCOSE 103 05/27/2015    GLUF 92 06/30/2020    CALCIUM 9 4 02/01/2022    AST 29 02/01/2022    ALT 47 02/01/2022    ALKPHOS 109 02/01/2022    PROT 7 4 05/27/2015    BILITOT 1 18 (H) 05/27/2015    EGFR 99 02/01/2022        Trend:  Lab Results   Component Value Date     9 0 01/24/2022

## 2022-02-26 ENCOUNTER — APPOINTMENT (OUTPATIENT)
Dept: RADIOLOGY | Facility: MEDICAL CENTER | Age: 32
End: 2022-02-26
Payer: COMMERCIAL

## 2022-02-26 ENCOUNTER — CLINICAL SUPPORT (OUTPATIENT)
Dept: URGENT CARE | Facility: MEDICAL CENTER | Age: 32
End: 2022-02-26
Payer: COMMERCIAL

## 2022-02-26 ENCOUNTER — APPOINTMENT (OUTPATIENT)
Dept: LAB | Facility: MEDICAL CENTER | Age: 32
End: 2022-02-26
Payer: COMMERCIAL

## 2022-02-26 DIAGNOSIS — N83.202 CYST OF LEFT OVARY: ICD-10-CM

## 2022-02-26 LAB
ALBUMIN SERPL BCP-MCNC: 4 G/DL (ref 3.5–5)
ALP SERPL-CCNC: 102 U/L (ref 46–116)
ALT SERPL W P-5'-P-CCNC: 68 U/L (ref 12–78)
ANION GAP SERPL CALCULATED.3IONS-SCNC: 5 MMOL/L (ref 4–13)
AST SERPL W P-5'-P-CCNC: 39 U/L (ref 5–45)
ATRIAL RATE: 77 BPM
BASOPHILS # BLD AUTO: 0.06 THOUSANDS/ΜL (ref 0–0.1)
BASOPHILS NFR BLD AUTO: 1 % (ref 0–1)
BILIRUB SERPL-MCNC: 1.15 MG/DL (ref 0.2–1)
BUN SERPL-MCNC: 14 MG/DL (ref 5–25)
CALCIUM SERPL-MCNC: 9.9 MG/DL (ref 8.3–10.1)
CHLORIDE SERPL-SCNC: 104 MMOL/L (ref 100–108)
CO2 SERPL-SCNC: 29 MMOL/L (ref 21–32)
CREAT SERPL-MCNC: 0.74 MG/DL (ref 0.6–1.3)
EOSINOPHIL # BLD AUTO: 0.15 THOUSAND/ΜL (ref 0–0.61)
EOSINOPHIL NFR BLD AUTO: 3 % (ref 0–6)
ERYTHROCYTE [DISTWIDTH] IN BLOOD BY AUTOMATED COUNT: 15.5 % (ref 11.6–15.1)
GFR SERPL CREATININE-BSD FRML MDRD: 107 ML/MIN/1.73SQ M
GLUCOSE P FAST SERPL-MCNC: 87 MG/DL (ref 65–99)
HCT VFR BLD AUTO: 45.6 % (ref 34.8–46.1)
HGB BLD-MCNC: 14.4 G/DL (ref 11.5–15.4)
IMM GRANULOCYTES # BLD AUTO: 0.01 THOUSAND/UL (ref 0–0.2)
IMM GRANULOCYTES NFR BLD AUTO: 0 % (ref 0–2)
LYMPHOCYTES # BLD AUTO: 2.17 THOUSANDS/ΜL (ref 0.6–4.47)
LYMPHOCYTES NFR BLD AUTO: 37 % (ref 14–44)
MCH RBC QN AUTO: 26.7 PG (ref 26.8–34.3)
MCHC RBC AUTO-ENTMCNC: 31.6 G/DL (ref 31.4–37.4)
MCV RBC AUTO: 84 FL (ref 82–98)
MONOCYTES # BLD AUTO: 0.58 THOUSAND/ΜL (ref 0.17–1.22)
MONOCYTES NFR BLD AUTO: 10 % (ref 4–12)
NEUTROPHILS # BLD AUTO: 2.97 THOUSANDS/ΜL (ref 1.85–7.62)
NEUTS SEG NFR BLD AUTO: 49 % (ref 43–75)
NRBC BLD AUTO-RTO: 0 /100 WBCS
P AXIS: 66 DEGREES
PLATELET # BLD AUTO: 255 THOUSANDS/UL (ref 149–390)
PMV BLD AUTO: 11.1 FL (ref 8.9–12.7)
POTASSIUM SERPL-SCNC: 3.5 MMOL/L (ref 3.5–5.3)
PR INTERVAL: 152 MS
PROT SERPL-MCNC: 8.7 G/DL (ref 6.4–8.2)
QRS AXIS: 54 DEGREES
QRSD INTERVAL: 72 MS
QT INTERVAL: 384 MS
QTC INTERVAL: 434 MS
RBC # BLD AUTO: 5.4 MILLION/UL (ref 3.81–5.12)
SODIUM SERPL-SCNC: 138 MMOL/L (ref 136–145)
T WAVE AXIS: 17 DEGREES
VENTRICULAR RATE: 77 BPM
WBC # BLD AUTO: 5.94 THOUSAND/UL (ref 4.31–10.16)

## 2022-02-26 PROCEDURE — 93010 ELECTROCARDIOGRAM REPORT: CPT | Performed by: INTERNAL MEDICINE

## 2022-02-26 PROCEDURE — 36415 COLL VENOUS BLD VENIPUNCTURE: CPT

## 2022-02-26 PROCEDURE — 85025 COMPLETE CBC W/AUTO DIFF WBC: CPT

## 2022-02-26 PROCEDURE — 80053 COMPREHEN METABOLIC PANEL: CPT

## 2022-02-26 PROCEDURE — 71046 X-RAY EXAM CHEST 2 VIEWS: CPT

## 2022-02-26 PROCEDURE — 93005 ELECTROCARDIOGRAM TRACING: CPT

## 2022-03-02 ENCOUNTER — TELEPHONE (OUTPATIENT)
Dept: GYNECOLOGIC ONCOLOGY | Facility: CLINIC | Age: 32
End: 2022-03-02

## 2022-03-03 ENCOUNTER — ANESTHESIA EVENT (OUTPATIENT)
Dept: PERIOP | Facility: HOSPITAL | Age: 32
DRG: 743 | End: 2022-03-03
Payer: COMMERCIAL

## 2022-03-08 RX ORDER — DIPHENOXYLATE HYDROCHLORIDE AND ATROPINE SULFATE 2.5; .025 MG/1; MG/1
1 TABLET ORAL DAILY
COMMUNITY

## 2022-03-08 NOTE — PRE-PROCEDURE INSTRUCTIONS
Pre-Surgery Instructions:   Medication Instructions    [START ON 3/24/2022] metroNIDAZOLE (FLAGYL) 500 mg tablet Patient was instructed by Physician and understands  Will Take @ 9pm Night before surgery    multivitamin (THERAGRAN) TABS  If your surgeon has not specifically prescribed this vitamin or instructed you to continue then stop taking 7 days prior to surgery per anesthesia guidelines   [START ON 3/24/2022] neomycin (MYCIFRADIN) 500 mg tablet Patient was instructed by Physician and understands  Will Take  1pm,4pm and 9pm Day Before surgery    ondansetron (ZOFRAN-ODT) 4 mg disintegrating tablet Instructed to take as needed including DOS with sips water    Pre op instructions per My Surgical Experience booklet,medications per anesthesia guidelines and showering instructions per [facility] protocol reviewed-Patient has CHG  Pt  Verbalized understanding of current visitor restrictions  Patient vaccinated  Instructed that mask is optional   Instructed to call surgeon with any Illness or Covid Exposure 1 week prior to surgery till DOS  Instructed to avoid all ASA and OTC Vit/Supp 1 week prior to surgery and to avoid NSAIDs 3 days prior to surgery per anesthesia instructions  Tylenol ok to take prn  Bowel prep per Dr Renny Alonso Instructions with ABX  Reviewed Ensure Carb Drink Instructions per Surgeon  Given 3 Ensures by Office  Pt  Verbalized an understanding of all instructions reviewed and offers no concerns at this time

## 2022-03-10 ENCOUNTER — TELEPHONE (OUTPATIENT)
Dept: GYNECOLOGIC ONCOLOGY | Facility: CLINIC | Age: 32
End: 2022-03-10

## 2022-03-14 ENCOUNTER — APPOINTMENT (OUTPATIENT)
Dept: LAB | Facility: MEDICAL CENTER | Age: 32
End: 2022-03-14
Payer: COMMERCIAL

## 2022-03-14 DIAGNOSIS — N83.202 CYST OF LEFT OVARY: ICD-10-CM

## 2022-03-15 ENCOUNTER — APPOINTMENT (OUTPATIENT)
Dept: LAB | Facility: MEDICAL CENTER | Age: 32
DRG: 743 | End: 2022-03-15
Payer: COMMERCIAL

## 2022-03-15 DIAGNOSIS — N83.202 CYST OF LEFT OVARY: Primary | ICD-10-CM

## 2022-03-15 PROCEDURE — 36415 COLL VENOUS BLD VENIPUNCTURE: CPT

## 2022-03-15 PROCEDURE — 86901 BLOOD TYPING SEROLOGIC RH(D): CPT

## 2022-03-15 PROCEDURE — 86900 BLOOD TYPING SEROLOGIC ABO: CPT

## 2022-03-15 PROCEDURE — 86850 RBC ANTIBODY SCREEN: CPT

## 2022-03-16 LAB
ABO GROUP BLD: NORMAL
BLD GP AB SCN SERPL QL: NEGATIVE
RH BLD: POSITIVE
SPECIMEN EXPIRATION DATE: NORMAL

## 2022-03-25 ENCOUNTER — HOSPITAL ENCOUNTER (INPATIENT)
Facility: HOSPITAL | Age: 32
LOS: 3 days | Discharge: HOME | DRG: 743 | End: 2022-03-28
Attending: OBSTETRICS & GYNECOLOGY | Admitting: OBSTETRICS & GYNECOLOGY
Payer: COMMERCIAL

## 2022-03-25 ENCOUNTER — ANESTHESIA (OUTPATIENT)
Dept: PERIOP | Facility: HOSPITAL | Age: 32
DRG: 743 | End: 2022-03-25
Payer: COMMERCIAL

## 2022-03-25 DIAGNOSIS — Z90.721 S/P LEFT OOPHORECTOMY: Primary | ICD-10-CM

## 2022-03-25 DIAGNOSIS — N83.202 CYST OF LEFT OVARY: ICD-10-CM

## 2022-03-25 PROBLEM — D64.9 ANEMIA: Status: RESOLVED | Noted: 2019-12-23 | Resolved: 2022-03-25

## 2022-03-25 PROBLEM — K64.9 BLEEDING HEMORRHOIDS: Status: RESOLVED | Noted: 2020-03-04 | Resolved: 2022-03-25

## 2022-03-25 PROBLEM — B34.9 VIRAL ILLNESS: Status: RESOLVED | Noted: 2021-08-27 | Resolved: 2022-03-25

## 2022-03-25 PROBLEM — D75.839 THROMBOCYTOSIS: Status: RESOLVED | Noted: 2019-12-23 | Resolved: 2022-03-25

## 2022-03-25 PROBLEM — M25.552 LEFT HIP PAIN: Status: RESOLVED | Noted: 2021-08-02 | Resolved: 2022-03-25

## 2022-03-25 LAB
EXT PREGNANCY TEST URINE: NEGATIVE
EXT. CONTROL: NORMAL

## 2022-03-25 PROCEDURE — 81025 URINE PREGNANCY TEST: CPT | Performed by: OBSTETRICS & GYNECOLOGY

## 2022-03-25 PROCEDURE — 0UT10ZZ RESECTION OF LEFT OVARY, OPEN APPROACH: ICD-10-PCS | Performed by: OBSTETRICS & GYNECOLOGY

## 2022-03-25 PROCEDURE — 88307 TISSUE EXAM BY PATHOLOGIST: CPT | Performed by: PATHOLOGY

## 2022-03-25 PROCEDURE — 88342 IMHCHEM/IMCYTCHM 1ST ANTB: CPT | Performed by: PATHOLOGY

## 2022-03-25 PROCEDURE — 88341 IMHCHEM/IMCYTCHM EA ADD ANTB: CPT | Performed by: PATHOLOGY

## 2022-03-25 PROCEDURE — 0DN80ZZ RELEASE SMALL INTESTINE, OPEN APPROACH: ICD-10-PCS | Performed by: OBSTETRICS & GYNECOLOGY

## 2022-03-25 PROCEDURE — NC001 PR NO CHARGE: Performed by: OBSTETRICS & GYNECOLOGY

## 2022-03-25 PROCEDURE — 88331 PATH CONSLTJ SURG 1 BLK 1SPC: CPT | Performed by: PATHOLOGY

## 2022-03-25 PROCEDURE — 88332 PATH CONSLTJ SURG EA ADD BLK: CPT | Performed by: PATHOLOGY

## 2022-03-25 PROCEDURE — C9290 INJ, BUPIVACAINE LIPOSOME: HCPCS | Performed by: ANESTHESIOLOGY

## 2022-03-25 PROCEDURE — 58925 REMOVAL OF OVARIAN CYST(S): CPT | Performed by: OBSTETRICS & GYNECOLOGY

## 2022-03-25 RX ORDER — NEOSTIGMINE METHYLSULFATE 1 MG/ML
INJECTION INTRAVENOUS AS NEEDED
Status: DISCONTINUED | OUTPATIENT
Start: 2022-03-25 | End: 2022-03-25

## 2022-03-25 RX ORDER — ONDANSETRON 2 MG/ML
INJECTION INTRAMUSCULAR; INTRAVENOUS AS NEEDED
Status: DISCONTINUED | OUTPATIENT
Start: 2022-03-25 | End: 2022-03-25

## 2022-03-25 RX ORDER — ROCURONIUM BROMIDE 10 MG/ML
INJECTION, SOLUTION INTRAVENOUS AS NEEDED
Status: DISCONTINUED | OUTPATIENT
Start: 2022-03-25 | End: 2022-03-25

## 2022-03-25 RX ORDER — GABAPENTIN 100 MG/1
100 CAPSULE ORAL ONCE
Status: DISCONTINUED | OUTPATIENT
Start: 2022-03-25 | End: 2022-03-25 | Stop reason: HOSPADM

## 2022-03-25 RX ORDER — MIDAZOLAM HYDROCHLORIDE 2 MG/2ML
INJECTION, SOLUTION INTRAMUSCULAR; INTRAVENOUS AS NEEDED
Status: DISCONTINUED | OUTPATIENT
Start: 2022-03-25 | End: 2022-03-25

## 2022-03-25 RX ORDER — SODIUM CHLORIDE 9 MG/ML
125 INJECTION, SOLUTION INTRAVENOUS CONTINUOUS
Status: DISCONTINUED | OUTPATIENT
Start: 2022-03-25 | End: 2022-03-25

## 2022-03-25 RX ORDER — LIDOCAINE HYDROCHLORIDE 20 MG/ML
INJECTION, SOLUTION EPIDURAL; INFILTRATION; INTRACAUDAL; PERINEURAL AS NEEDED
Status: DISCONTINUED | OUTPATIENT
Start: 2022-03-25 | End: 2022-03-25

## 2022-03-25 RX ORDER — HEPARIN SODIUM 5000 [USP'U]/ML
5000 INJECTION, SOLUTION INTRAVENOUS; SUBCUTANEOUS ONCE
Status: COMPLETED | OUTPATIENT
Start: 2022-03-25 | End: 2022-03-25

## 2022-03-25 RX ORDER — SODIUM CHLORIDE 9 MG/ML
INJECTION, SOLUTION INTRAVENOUS CONTINUOUS PRN
Status: DISCONTINUED | OUTPATIENT
Start: 2022-03-25 | End: 2022-03-25

## 2022-03-25 RX ORDER — DEXAMETHASONE SODIUM PHOSPHATE 10 MG/ML
INJECTION, SOLUTION INTRAMUSCULAR; INTRAVENOUS AS NEEDED
Status: DISCONTINUED | OUTPATIENT
Start: 2022-03-25 | End: 2022-03-25

## 2022-03-25 RX ORDER — ACETAMINOPHEN 325 MG/1
975 TABLET ORAL EVERY 6 HOURS SCHEDULED
Status: DISCONTINUED | OUTPATIENT
Start: 2022-03-25 | End: 2022-03-28 | Stop reason: HOSPADM

## 2022-03-25 RX ORDER — DIPHENHYDRAMINE HCL 25 MG
25 TABLET ORAL EVERY 6 HOURS PRN
Status: DISCONTINUED | OUTPATIENT
Start: 2022-03-25 | End: 2022-03-28 | Stop reason: HOSPADM

## 2022-03-25 RX ORDER — BUPIVACAINE HYDROCHLORIDE 5 MG/ML
INJECTION, SOLUTION PERINEURAL
Status: COMPLETED | OUTPATIENT
Start: 2022-03-25 | End: 2022-03-25

## 2022-03-25 RX ORDER — GLYCOPYRROLATE 0.2 MG/ML
INJECTION INTRAMUSCULAR; INTRAVENOUS AS NEEDED
Status: DISCONTINUED | OUTPATIENT
Start: 2022-03-25 | End: 2022-03-25

## 2022-03-25 RX ORDER — CELECOXIB 200 MG/1
200 CAPSULE ORAL ONCE
Status: DISCONTINUED | OUTPATIENT
Start: 2022-03-25 | End: 2022-03-25 | Stop reason: HOSPADM

## 2022-03-25 RX ORDER — SCOLOPAMINE TRANSDERMAL SYSTEM 1 MG/1
1 PATCH, EXTENDED RELEASE TRANSDERMAL
Status: DISCONTINUED | OUTPATIENT
Start: 2022-03-25 | End: 2022-03-25 | Stop reason: HOSPADM

## 2022-03-25 RX ORDER — ONDANSETRON 2 MG/ML
4 INJECTION INTRAMUSCULAR; INTRAVENOUS ONCE AS NEEDED
Status: COMPLETED | OUTPATIENT
Start: 2022-03-25 | End: 2022-03-25

## 2022-03-25 RX ORDER — FENTANYL CITRATE 50 UG/ML
INJECTION, SOLUTION INTRAMUSCULAR; INTRAVENOUS AS NEEDED
Status: DISCONTINUED | OUTPATIENT
Start: 2022-03-25 | End: 2022-03-25

## 2022-03-25 RX ORDER — FENTANYL CITRATE/PF 50 MCG/ML
25 SYRINGE (ML) INJECTION
Status: DISCONTINUED | OUTPATIENT
Start: 2022-03-25 | End: 2022-03-25 | Stop reason: HOSPADM

## 2022-03-25 RX ORDER — HYDROMORPHONE HCL/PF 1 MG/ML
SYRINGE (ML) INJECTION AS NEEDED
Status: DISCONTINUED | OUTPATIENT
Start: 2022-03-25 | End: 2022-03-25

## 2022-03-25 RX ORDER — DOCUSATE SODIUM 100 MG/1
100 CAPSULE, LIQUID FILLED ORAL 2 TIMES DAILY
Status: DISCONTINUED | OUTPATIENT
Start: 2022-03-25 | End: 2022-03-26

## 2022-03-25 RX ORDER — CALCIUM CARBONATE 200(500)MG
1000 TABLET,CHEWABLE ORAL DAILY PRN
Status: DISCONTINUED | OUTPATIENT
Start: 2022-03-25 | End: 2022-03-28 | Stop reason: HOSPADM

## 2022-03-25 RX ORDER — CEFAZOLIN SODIUM 1 G/50ML
1000 SOLUTION INTRAVENOUS ONCE
Status: COMPLETED | OUTPATIENT
Start: 2022-03-25 | End: 2022-03-25

## 2022-03-25 RX ORDER — PROPOFOL 10 MG/ML
INJECTION, EMULSION INTRAVENOUS AS NEEDED
Status: DISCONTINUED | OUTPATIENT
Start: 2022-03-25 | End: 2022-03-25

## 2022-03-25 RX ORDER — KETOROLAC TROMETHAMINE 30 MG/ML
INJECTION, SOLUTION INTRAMUSCULAR; INTRAVENOUS AS NEEDED
Status: DISCONTINUED | OUTPATIENT
Start: 2022-03-25 | End: 2022-03-25

## 2022-03-25 RX ORDER — ONDANSETRON 2 MG/ML
4 INJECTION INTRAMUSCULAR; INTRAVENOUS EVERY 6 HOURS PRN
Status: DISCONTINUED | OUTPATIENT
Start: 2022-03-25 | End: 2022-03-28 | Stop reason: HOSPADM

## 2022-03-25 RX ORDER — SODIUM CHLORIDE 9 MG/ML
125 INJECTION, SOLUTION INTRAVENOUS CONTINUOUS
Status: DISCONTINUED | OUTPATIENT
Start: 2022-03-25 | End: 2022-03-26

## 2022-03-25 RX ORDER — HEPARIN SODIUM 5000 [USP'U]/ML
5000 INJECTION, SOLUTION INTRAVENOUS; SUBCUTANEOUS
Status: DISCONTINUED | OUTPATIENT
Start: 2022-03-26 | End: 2022-03-25

## 2022-03-25 RX ORDER — ACETAMINOPHEN 325 MG/1
975 TABLET ORAL ONCE
Status: COMPLETED | OUTPATIENT
Start: 2022-03-25 | End: 2022-03-25

## 2022-03-25 RX ORDER — KETOROLAC TROMETHAMINE 30 MG/ML
15 INJECTION, SOLUTION INTRAMUSCULAR; INTRAVENOUS EVERY 6 HOURS SCHEDULED
Status: DISPENSED | OUTPATIENT
Start: 2022-03-25 | End: 2022-03-27

## 2022-03-25 RX ADMIN — HYDROMORPHONE HYDROCHLORIDE 0.5 MG: 1 INJECTION, SOLUTION INTRAMUSCULAR; INTRAVENOUS; SUBCUTANEOUS at 16:21

## 2022-03-25 RX ADMIN — METRONIDAZOLE 500 MG: 500 INJECTION, SOLUTION INTRAVENOUS at 15:02

## 2022-03-25 RX ADMIN — NEOSTIGMINE METHYLSULFATE 3 MG: 1 INJECTION INTRAVENOUS at 17:17

## 2022-03-25 RX ADMIN — ONDANSETRON 4 MG: 2 INJECTION INTRAMUSCULAR; INTRAVENOUS at 17:00

## 2022-03-25 RX ADMIN — SCOPALAMINE 1 PATCH: 1 PATCH, EXTENDED RELEASE TRANSDERMAL at 12:42

## 2022-03-25 RX ADMIN — ROCURONIUM BROMIDE 10 MG: 50 INJECTION, SOLUTION INTRAVENOUS at 16:02

## 2022-03-25 RX ADMIN — GLYCOPYRROLATE 0.4 MG: 0.2 INJECTION, SOLUTION INTRAMUSCULAR; INTRAVENOUS at 17:17

## 2022-03-25 RX ADMIN — MIDAZOLAM 2 MG: 1 INJECTION INTRAMUSCULAR; INTRAVENOUS at 15:06

## 2022-03-25 RX ADMIN — ONDANSETRON 4 MG: 2 INJECTION INTRAMUSCULAR; INTRAVENOUS at 18:30

## 2022-03-25 RX ADMIN — CEFAZOLIN SODIUM 1000 MG: 1 SOLUTION INTRAVENOUS at 15:02

## 2022-03-25 RX ADMIN — FENTANYL CITRATE 100 MCG: 50 INJECTION INTRAMUSCULAR; INTRAVENOUS at 15:17

## 2022-03-25 RX ADMIN — DEXAMETHASONE SODIUM PHOSPHATE 6 MG: 10 INJECTION INTRAMUSCULAR; INTRAVENOUS at 15:35

## 2022-03-25 RX ADMIN — SODIUM CHLORIDE 125 ML/HR: 0.9 INJECTION, SOLUTION INTRAVENOUS at 13:03

## 2022-03-25 RX ADMIN — ACETAMINOPHEN 325MG 975 MG: 325 TABLET ORAL at 12:42

## 2022-03-25 RX ADMIN — PROPOFOL 140 MG: 10 INJECTION, EMULSION INTRAVENOUS at 15:17

## 2022-03-25 RX ADMIN — LIDOCAINE HYDROCHLORIDE 60 MG: 20 INJECTION, SOLUTION EPIDURAL; INFILTRATION; INTRACAUDAL; PERINEURAL at 15:17

## 2022-03-25 RX ADMIN — FENTANYL CITRATE 50 MCG: 50 INJECTION INTRAMUSCULAR; INTRAVENOUS at 16:24

## 2022-03-25 RX ADMIN — FENTANYL CITRATE 50 MCG: 50 INJECTION INTRAMUSCULAR; INTRAVENOUS at 15:43

## 2022-03-25 RX ADMIN — SODIUM CHLORIDE 125 ML/HR: 0.9 INJECTION, SOLUTION INTRAVENOUS at 23:01

## 2022-03-25 RX ADMIN — KETOROLAC TROMETHAMINE 30 MG: 30 INJECTION, SOLUTION INTRAMUSCULAR at 17:06

## 2022-03-25 RX ADMIN — BUPIVACAINE HYDROCHLORIDE 30 ML: 5 INJECTION, SOLUTION PERINEURAL at 15:35

## 2022-03-25 RX ADMIN — ROCURONIUM BROMIDE 10 MG: 50 INJECTION, SOLUTION INTRAVENOUS at 16:37

## 2022-03-25 RX ADMIN — PROPOFOL 60 MG: 10 INJECTION, EMULSION INTRAVENOUS at 17:13

## 2022-03-25 RX ADMIN — FENTANYL CITRATE 25 MCG: 50 INJECTION, SOLUTION INTRAMUSCULAR; INTRAVENOUS at 17:53

## 2022-03-25 RX ADMIN — HEPARIN SODIUM 5000 UNITS: 5000 INJECTION INTRAVENOUS; SUBCUTANEOUS at 14:13

## 2022-03-25 RX ADMIN — SODIUM CHLORIDE: 0.9 INJECTION, SOLUTION INTRAVENOUS at 15:16

## 2022-03-25 RX ADMIN — SODIUM CHLORIDE: 0.9 INJECTION, SOLUTION INTRAVENOUS at 15:25

## 2022-03-25 RX ADMIN — Medication: at 18:14

## 2022-03-25 RX ADMIN — ROCURONIUM BROMIDE 40 MG: 50 INJECTION, SOLUTION INTRAVENOUS at 15:17

## 2022-03-25 RX ADMIN — SODIUM CHLORIDE 125 ML/HR: 0.9 INJECTION, SOLUTION INTRAVENOUS at 19:51

## 2022-03-25 RX ADMIN — SODIUM CHLORIDE: 0.9 INJECTION, SOLUTION INTRAVENOUS at 16:50

## 2022-03-25 NOTE — ANESTHESIA POSTPROCEDURE EVALUATION
Post-Op Assessment Note    CV Status:  Stable    Pain management: adequate     Mental Status:  Alert and awake   Hydration Status:  Euvolemic   PONV Controlled:  Controlled   Airway Patency:  Patent      Post Op Vitals Reviewed: Yes      Staff: Anesthesiologist, CRNA         No complications documented      BP 99/64 (03/25/22 1948)    Temp 98 7 °F (37 1 °C) (03/25/22 1948)    Pulse (!) 106 (03/25/22 1948)   Resp 18 (03/25/22 1948)    SpO2 92 % (03/25/22 1948)

## 2022-03-25 NOTE — DISCHARGE INSTRUCTIONS
Exploratory Laparoscopy   WHAT YOU NEED TO KNOW:   Exploratory laparoscopy is surgery to look for causes of pain, abnormal growths, bleeding, or disease in your abdomen  During this surgery, small incisions are made in your abdomen  A small scope and tools are inserted through these incisions  A scope is a flexible tube with a light and camera on the end  DISCHARGE INSTRUCTIONS:   Medicines:   · Antibiotics: This medicine is given to fight or prevent an infection caused by bacteria  · Pain medicine: You may be given a prescription medicine to decrease pain  Do not wait until the pain is severe before you take this medicine  · Take your medicine as directed  Contact your healthcare provider if you think your medicine is not helping or if you have side effects  Tell him or her if you are allergic to any medicine  Keep a list of the medicines, vitamins, and herbs you take  Include the amounts, and when and why you take them  Bring the list or the pill bottles to follow-up visits  Carry your medicine list with you in case of an emergency  Follow up with your healthcare provider or surgeon as directed: You may need to return to have your stitches removed  Write down your questions so you remember to ask them during your visits  Wound care:   · Follow your healthcare provider or surgeon's instructions: You may need to keep the bandages on your incisions for 1 to 2 days or until your follow-up visit  After your follow-up visit, you may need to change your bandage 1 to 2 times a day  · Wash your hands:  Use soap and warm water to wash your hands  Do this before and after you care for your wound  Hand washing helps prevent an infection  · Remove your bandages gently:  If the bandage sticks to your wound, use warm water on the bandage and lift it off slowly  Lift the edges toward the center of your wound  Carefully wash around the wound with soap and water   Try not to get soap and water directly on your wound  Dry the area and put on new, clean bandages as directed  Change your bandages when they get wet or dirty  Ask how to clean your wounds after your stitches are removed  Self-care:   · Pain:  You may have neck or shoulder pain from gas used during your surgery  Rest and use heat on your shoulder to help decrease the pain  You may be more comfortable if you elevate your head and shoulders on several pillows  · Rest:  You may feel like resting more after your surgery  Slowly start to do more each day  Rest when you feel it is needed  · Prevent constipation:  High-fiber foods, extra liquids, and regular exercise can help you prevent constipation  Examples of high-fiber foods are fruit and bran  Prune juice and water are good liquids to drink  Regular exercise helps your digestive system work  You may also be told to take over-the-counter fiber and stool softener medicines  Take these items as directed  · Vaginal bleeding: You may have vaginal bleeding for a day or two if your laparoscopy was done for a female problem  Ask when you can bathe:  Ask your healthcare provider when you can take a shower or bath  Contact your healthcare provider or surgeon if:   · You have a fever  · You have pain in your abdomen or shoulder that does not go away after 2 days or gets worse  · You have more vaginal bleeding or discharge than you expected  · You have trouble having a bowel movement, or have diarrhea often  · You have repeated vomiting  · You have chills, a cough, or feel weak and achy  · Your stitches are swollen, red, or have pus coming from them  · You have questions or concerns about your condition or care  Seek care immediately or call 911 if:   · Your incisions come apart  · Your incisions bleed, or blood soaks through your bandage  · Your arm or leg feels warm, tender, and painful  It may look swollen and red      · You suddenly feel lightheaded and short of breath  · You have chest pain when you take a deep breath or cough  You may cough up blood  © Copyright [employer] 2018 Information is for End User's use only and may not be sold, redistributed or otherwise used for commercial purposes  All illustrations and images included in CareNotes® are the copyrighted property of [employer]  or Nina [employer]  The above information is an  only  It is not intended as medical advice for individual conditions or treatments  Talk to your doctor, nurse or pharmacist before following any medical regimen to see if it is safe and effective for you

## 2022-03-25 NOTE — H&P
Cyst of left ovary - Primary       Patient is a new diagnosis of persistent left ovarian cyst consistent with a dermoid  We discussed treatment options and have recommended ovarian cystectomy  We have discussed open verses laparoscopic surgery  Due to the patient's significant surgical history we have recommended open ovarian cystectomy      We discussed with the patient risks and benefits of the procedure including bleeding requiring transfusion infection and damage to local structures including bowel or bladder  The patient understands and accepts the risks of surgery and has signed an informed consent  She understands that she is at higher risk for bowel injury due to the prior surgeries      Preoperative chest x-ray EKG routine blood work will be performed  A bowel prep will be recommended with antibiotics due to the high risk of perforation            Relevant Orders     Case request operating room: LAPAROTOMY EXPLORATORY left ovarian cystectomy (Completed)     Type and screen     Comprehensive metabolic panel     CBC and differential     EKG 12 lead     XR chest pa & lateral                   CHIEF COMPLAINT:  Dermoid cyst history of significant adhesions in the abdomen           Patient ID: Abigail Jordan is a 27 y o  female  Patient is very pleasant 69-year-old female seen in consultation from Dr Rachael Kinney regarding evaluation and management of ovarian dermoid      Patient is very pleasant 69-year-old  0 with history of Patient was in her usual state of health until she developed abdominal pain and went to the emergency department in 2021  The patient was experiencing viral gastroenteritis and a CT scan was ordered  The results of the following      ABDOMEN     LOWER CHEST:  No clinically significant abnormality identified in the visualized lower chest      LIVER/BILIARY TREE:  Unremarkable      GALLBLADDER:  No calcified gallstones   No pericholecystic inflammatory change      SPLEEN:  Unremarkable      PANCREAS:  Unremarkable      ADRENAL GLANDS:  Unremarkable      KIDNEYS/URETERS:  Unremarkable  No hydronephrosis      STOMACH AND BOWEL:  Unremarkable      APPENDIX:  No findings to suggest appendicitis      ABDOMINOPELVIC CAVITY:  Small pelvic ascites  No pneumoperitoneum  No lymphadenopathy      VESSELS:  Unremarkable for patient's age      PELVIS     REPRODUCTIVE ORGANS:  3 3 cm left ovarian mass  Follow-up pelvic ultrasound is recommended for further evaluation      URINARY BLADDER:  Unremarkable      ABDOMINAL WALL/INGUINAL REGIONS:  Unremarkable      OSSEOUS STRUCTURES:  No acute fracture or destructive osseous lesion      IMPRESSION:     3 3 cm left ovarian mass  Follow-up pelvic ultrasound is recommended for further evaluation         A follow-up ultrasound was performed revealing a 5 cm left ovarian mass with calcifications consistent with a dermoid      The patient has a history of bowel related issues associated with prematurity when she was an infant  She underwent abdominal surgery at that time and has been functioning normally since then  The patient has undergone gynecologic surgery in the past 5 years and has had significant adhesions  The patient was therefore referred to us for surgical management      Patient underwent blood work including a normal CEA,  and hCG of less than 2  CBC and CPM P were unremarkable with the exception of some minor anemia      Patient reports positive urine pregnancy test in late December shortly before the serum pregnancy test was negative  She has had a normal   Since that time  She has no other issues  Today, the patient is doing well    She denies significant abdominal pain, pelvic pain, nausea, vomiting, constipation, diarrhea, fevers, chills, or vaginal bleeding         Review of Systems     Current Medications          Current Outpatient Medications   Medication Sig Dispense Refill    benzonatate (TESSALON PERLES) 100 mg capsule Take 1 capsule (100 mg total) by mouth 3 (three) times a day as needed for cough (Patient not taking: Reported on 9/6/2021) 20 capsule 0    diphenoxylate-atropine (LOMOTIL) 2 5-0 025 mg/5 mL liquid Take 5 mL by mouth 4 (four) times a day as needed for diarrhea 60 mL 0    ferrous gluconate (FERGON) 324 mg tablet Take 1 tablet (324 mg total) by mouth 2 (two) times a day before meals (Patient not taking: Reported on 7/20/2021)   0    lidocaine (LIDODERM) 5 % Apply 1 patch topically daily as needed (left hip pain) (Patient not taking: Reported on 9/6/2021) 30 patch 0    methocarbamol (ROBAXIN) 500 mg tablet Take 1 tablet (500 mg total) by mouth daily as needed for muscle spasms (Patient not taking: Reported on 9/6/2021) 30 tablet 0    naproxen (NAPROSYN) 500 mg tablet Take 1 tablet (500 mg total) by mouth 2 (two) times a day with meals (Patient not taking: Reported on 8/2/2021) 10 tablet 0    ondansetron (ZOFRAN-ODT) 4 mg disintegrating tablet Take 1 tablet (4 mg total) by mouth every 8 (eight) hours as needed for nausea or vomiting 10 tablet 0    pantoprazole (PROTONIX) 40 mg tablet Take 1 tablet (40 mg total) by mouth daily (Patient not taking: Reported on 7/20/2021) 30 tablet 3    phenazopyridine (PYRIDIUM) 200 mg tablet Take 1 tablet (200 mg total) by mouth 3 (three) times a day with meals 10 tablet 0      No current facility-administered medications for this visit                   Allergies   Allergen Reactions    Latex         Condoms  Burning sensation wears normal underwear and can eat banans          Medical History        Past Medical History:   Diagnosis Date    Anemia       iron def    History of transfusion       12/2019    Irritable bowel syndrome              Surgical History         Past Surgical History:   Procedure Laterality Date    COLONOSCOPY        EXAMINATION UNDER ANESTHESIA N/A 7/8/2020     Procedure: EXAM UNDER ANESTHESIA (EUA), diagnostic anoscopy with control of bleeding;  Surgeon: Codie Alexander MD;  [facility]: [facility];  Service: Colorectal    EXPLORATORY LAPAROTOMY        UT COLONOSCOPY FLX DX W/COLLJ SPEC WHEN PFRMD N/A 8/1/2018     Procedure: COLONOSCOPY;  Surgeon: Neno Rosenthal MD;  [facility]: [facility]; Service: Colorectal    UT HEMORRHOIDOPEXY BY STAPLING N/A 6/23/2020     Procedure: Hemorrhoidectomy, internal and external,3 column;  Surgeon: Yenifer Farah MD;  Location: [facility];  Service: Colorectal    UPPER GASTROINTESTINAL ENDOSCOPY                OB History    No obstetric history on file                   Family History   Problem Relation Age of Onset    Diabetes Mother      HIV Father      Colon cancer Neg Hx           The following portions of the patient's history were reviewed and updated as appropriate: allergies, current medications, past medical history, past social history, past surgical history and problem list         Objective:     Blood pressure 134/90, pulse 95, temperature 97 9 °F (36 6 °C), temperature source Temporal, resp  rate 17, height 5' 1" (1 549 m), weight 54 kg (119 lb), SpO2 99 %  Body mass index is 22 48 kg/m²      Physical Exam  Constitutional:       Appearance: She is well-developed  HENT:      Head: Normocephalic and atraumatic  Eyes:      Pupils: Pupils are equal, round, and reactive to light  Cardiovascular:      Rate and Rhythm: Normal rate and regular rhythm  Heart sounds: Normal heart sounds  Pulmonary:      Effort: Pulmonary effort is normal  No respiratory distress  Breath sounds: Normal breath sounds  Chest:   Breasts:      Right: No supraclavicular adenopathy  Left: No supraclavicular adenopathy          Abdominal:      General: Bowel sounds are normal  There is no distension  Palpations: Abdomen is soft  Abdomen is not rigid  Tenderness: There is no abdominal tenderness  There is no guarding or rebound     Genitourinary:     Comments: -Normal external female genitalia, normal Bartholin's and [place]'s glands                  -Normal midline urethral meatus  No lesions notes                  -Bladder without fullness mass or tenderness                  -Vagina without lesion or discharge No significant cystocele or rectocele noted                  -Cervix normal appearing without visible lesions                  -Uterus with normal contour, mobility  No tenderness,                  -Adnexae without  mass or tenderness                  - Anus without fissure of lesion    Musculoskeletal:         General: Normal range of motion  Cervical back: Normal range of motion and neck supple  Lymphadenopathy:      Cervical: No cervical adenopathy  Upper Body:      Right upper body: No supraclavicular adenopathy  Left upper body: No supraclavicular adenopathy  Skin:     General: Skin is warm and dry  Neurological:      Mental Status: She is alert and oriented to person, place, and time     Psychiatric:         Behavior: Behavior normal                      Lab Results   Component Value Date      9 0 01/24/2022            Lab Results   Component Value Date     WBC 10 11 02/01/2022     HGB 13 6 02/01/2022     HCT 44 1 02/01/2022     MCV 85 02/01/2022      02/01/2022            Lab Results   Component Value Date      05/27/2015     K 3 6 02/01/2022      02/01/2022     CO2 29 02/01/2022     ANIONGAP 7 05/27/2015     BUN 15 02/01/2022     CREATININE 0 79 02/01/2022     GLUCOSE 103 05/27/2015     GLUF 92 06/30/2020     CALCIUM 9 4 02/01/2022     AST 29 02/01/2022     ALT 47 02/01/2022     ALKPHOS 109 02/01/2022     PROT 7 4 05/27/2015     BILITOT 1 18 (H) 05/27/2015     EGFR 99 02/01/2022          Trend:        Lab Results   Component Value Date      9 0 01/24/2022

## 2022-03-25 NOTE — ANESTHESIA PREPROCEDURE EVALUATION
Procedure:  OPEN OVARIAN CYSTECTOMY (Left Abdomen)  EXPLORATORY LAPAROTOMY (N/A Abdomen)    Relevant Problems   CARDIO   (+) Bleeding hemorrhoids      GI/HEPATIC   (+) Bleeding hemorrhoids      HEMATOLOGY   (+) Anemia      NEURO/PSYCH   (+) History of hemorrhoids   (+) PTSD (post-traumatic stress disorder)        Physical Exam    Airway    Mallampati score: II  TM Distance: >3 FB  Neck ROM: full     Dental   No notable dental hx     Cardiovascular  Cardiovascular exam normal    Pulmonary  Pulmonary exam normal     Other Findings        Anesthesia Plan  ASA Score- 2     Anesthesia Type- general with ASA Monitors  Additional Monitors:   Airway Plan:     Comment: TAP vs Epidural  Pt has card from [facility] stating that a 7 0 and 6 5 ETT were unable to pass the vc   6 0 ETT used at that time in 2016  Pt had anesthetic in 2016- as per anesthesia record 7 0 ETT used at that time with no issue  Plan Factors-Exercise tolerance (METS): >4 METS  Chart reviewed  Patient is not a current smoker  Patient instructed to abstain from smoking on day of procedure  Patient did not smoke on day of surgery  Obstructive sleep apnea risk education given perioperatively  Induction- intravenous  Postoperative Plan- Plan for postoperative opioid use  Planned trial extubation    Informed Consent- Anesthetic plan and risks discussed with patient

## 2022-03-25 NOTE — ANESTHESIA PROCEDURE NOTES
Peripheral Block    Patient location during procedure: OR  Start time: 3/25/2022 3:20 PM  Reason for block: at surgeon's request and post-op pain management  Staffing  Performed: Anesthesiologist   Anesthesiologist: Aura Roberts DO  Preanesthetic Checklist  Completed: patient identified, IV checked, site marked, risks and benefits discussed, surgical consent, monitors and equipment checked, pre-op evaluation and timeout performed  Peripheral Block  Patient position: supine  Prep: ChloraPrep  Patient monitoring: heart rate, cardiac monitor, continuous pulse ox and frequent blood pressure checks  Block type: TAP  Laterality: bilateral  Injection technique: single-shot  Procedures: ultrasound guided, Ultrasound guidance required for the procedure to increase accuracy and safety of medication placement and decrease risk of complications  Ultrasound permanent image savedbupivacaine (MARCAINE) 0 5 % perineural infiltration, 30 mL (exparel 20 ml total + NS 10 ml)  Needle  Needle type: Stimuplex   Needle gauge: 27 G  Needle length: 10 cm  Needle localization: anatomical landmarks and ultrasound guidance  Assessment  Injection assessment: incremental injection, local visualized surrounding nerve on ultrasound and negative aspiration for heme  Heart rate change: no  Slow fractionated injection: yes  Post-procedure:  site cleaned  Patient tolerance of procedure: pt anesthetized, no side effects noted

## 2022-03-25 NOTE — INTERVAL H&P NOTE
H&P reviewed  After examining the patient I find no changes in the patients condition since the H&P had been written    Plan left ovarian cystectomy possible left oophorectomy for likely dermoid cyst   Open roof planned due to significant adhesions from prior surgery per referring gyn    Vitals:    03/25/22 1228   BP: 121/68   Pulse: 105   Resp: 18   Temp: 98 3 °F (36 8 °C)   SpO2: 100%

## 2022-03-25 NOTE — ASSESSMENT & PLAN NOTE
POD#3 s/p open L oophorectomy w/ benign frozen (normal ovarian tissue)  Hgb 14 4 --> 8 3 --> 11 9 --> 13 0   Pain: Tylenol [address], Ann 5/10 PRN, s/p dPCA  S/p doyle catheter, voiding appropriately  FEN:  regular diet ; s/p IV fluids   K 3 2 --> 3 6 s/p repletion  DVT ppx: SCDs  F/u final pathology  Plan for d/c today

## 2022-03-25 NOTE — OP NOTE
OPERATIVE REPORT  PATIENT NAME: Antonio Kapadia    :  1990  MRN: [id number]  Pt Location: [facility] OR ROOM 08    SURGERY DATE: 3/25/2022    Surgeon(s) and Role:     * Erin Hopson MD - Primary     * Venecia Barnes MD - Assisting     * Brock Greenfield MD - Assisting    Preop Diagnosis:  Cyst of left ovary [N83 202]    Post-Op Diagnosis Codes:     * Cyst of left ovary [N83 202]    Procedure(s) (LRB):  OPEN OVARIAN CYSTECTOMY (Left)  EXPLORATORY LAPAROTOMY (N/A)    Specimen(s):  ID Type Source Tests Collected by Time Destination   1 :  Tissue Ovary, Left TISSUE EXAM Erin Hopson MD 3/25/2022 1638        Estimated Blood Loss:   250 mL    Drains:  Urethral Catheter Non-latex 16 Fr  (Active)   Number of days: 0       Anesthesia Type:   General    Operative Indications:  Cyst of left ovary [N83 202]  Extensive lysis of adhesions lasting an hour    Operative Findings:  Dense adhesions of all abdominal surface including small bowel to small bowel to anterior abdominal wall to uterus to left ovary  Left ovary was identified and a palpable nodule was within it  Due to the extensive lysis of adhesions the ovary was traumatized and was removed in total     Once removed the ovary had a palpable 2 cm mass with in it  The ovaries approximately 4 cm  Frozen section diagnosis indicated a benign findings  No evidence of a dermoid  Only 1 loop of small bowel was freed  The fundus of the uterus was seen  The bladder was seen  The there were no other visible findings  Minimal blood loss was noted the several peritoneal inclusion cyst leaked straw-colored fluid during the procedure  Complications:   None    Procedure and Technique:  After establishment of adequate general endotracheal anesthesia the patient was placed in supine position and prepped and draped in the usual sterile fashion  The patient was frog-legged and a vaginal prep and Fox catheter were placed      A vertical midline incision over previous vertical midline incision was created with a knife  The skin was removed  The subcutaneous tissue was taken down to the fascia with the Bovie electrocautery device  The fascia was incised with the Bovie electrocautery device  The peritoneum was incised with Bovie electrocautery device and bowel was immediately seen densely adherent to the anterior abdominal wall  Extensive lysis of adhesions was performed to release 1 loop of small bowel  The left ovary was immediately palpable and the fundus was palpable  Extensive dissection was performed to free up these structures  The round ligament was identified and the left utero-ovarian ligament was taken down with a large jaw EnSeal device  Continued dissection of the remainder of the ovary was performed with a sharp dissection and Bovie electrocautery device  The IP ligament was identified and taken down with the large jaw EnSeal device  The ovary was removed and sent to pathology  The abdomen is irrigated no bleeding was noted  Frozen section diagnosis returned with benign disease no evidence of dermoid or malignancy  The fascia was closed with 0 looped PDS  Subcutaneous tissue was closed with 2-0 Prolene in the skin was closed with 4-0 Stratafix      The patient tolerated procedure without complication sponge instrument counts were correct prior to closure and hemostasis was assured prior to closure   I was present for the entire procedure    Patient Disposition:  PACU       SIGNATURE: Hieu Beth MD  DATE: March 25, 2022  TIME: 5:13 PM

## 2022-03-26 LAB
ANION GAP SERPL CALCULATED.3IONS-SCNC: 10 MMOL/L (ref 4–13)
BASOPHILS # BLD AUTO: 0.01 THOUSANDS/ΜL (ref 0–0.1)
BASOPHILS NFR BLD AUTO: 0 % (ref 0–1)
BUN SERPL-MCNC: 11 MG/DL (ref 5–25)
CALCIUM SERPL-MCNC: 7.4 MG/DL (ref 8.3–10.1)
CHLORIDE SERPL-SCNC: 111 MMOL/L (ref 100–108)
CO2 SERPL-SCNC: 21 MMOL/L (ref 21–32)
CREAT SERPL-MCNC: 0.85 MG/DL (ref 0.6–1.3)
EOSINOPHIL # BLD AUTO: 0 THOUSAND/ΜL (ref 0–0.61)
EOSINOPHIL NFR BLD AUTO: 0 % (ref 0–6)
ERYTHROCYTE [DISTWIDTH] IN BLOOD BY AUTOMATED COUNT: 14.8 % (ref 11.6–15.1)
GFR SERPL CREATININE-BSD FRML MDRD: 90 ML/MIN/1.73SQ M
GLUCOSE SERPL-MCNC: 107 MG/DL (ref 65–140)
HCT VFR BLD AUTO: 26 % (ref 34.8–46.1)
HGB BLD-MCNC: 8.3 G/DL (ref 11.5–15.4)
IMM GRANULOCYTES # BLD AUTO: 0.03 THOUSAND/UL (ref 0–0.2)
IMM GRANULOCYTES NFR BLD AUTO: 1 % (ref 0–2)
LYMPHOCYTES # BLD AUTO: 0.68 THOUSANDS/ΜL (ref 0.6–4.47)
LYMPHOCYTES NFR BLD AUTO: 11 % (ref 14–44)
MCH RBC QN AUTO: 27.9 PG (ref 26.8–34.3)
MCHC RBC AUTO-ENTMCNC: 31.9 G/DL (ref 31.4–37.4)
MCV RBC AUTO: 87 FL (ref 82–98)
MONOCYTES # BLD AUTO: 0.54 THOUSAND/ΜL (ref 0.17–1.22)
MONOCYTES NFR BLD AUTO: 9 % (ref 4–12)
NEUTROPHILS # BLD AUTO: 5.04 THOUSANDS/ΜL (ref 1.85–7.62)
NEUTS SEG NFR BLD AUTO: 79 % (ref 43–75)
NRBC BLD AUTO-RTO: 0 /100 WBCS
PLATELET # BLD AUTO: 141 THOUSANDS/UL (ref 149–390)
PMV BLD AUTO: 10.3 FL (ref 8.9–12.7)
POTASSIUM SERPL-SCNC: 3.3 MMOL/L (ref 3.5–5.3)
RBC # BLD AUTO: 2.98 MILLION/UL (ref 3.81–5.12)
SODIUM SERPL-SCNC: 142 MMOL/L (ref 136–145)
WBC # BLD AUTO: 6.3 THOUSAND/UL (ref 4.31–10.16)

## 2022-03-26 PROCEDURE — 85025 COMPLETE CBC W/AUTO DIFF WBC: CPT | Performed by: OBSTETRICS & GYNECOLOGY

## 2022-03-26 PROCEDURE — 80048 BASIC METABOLIC PNL TOTAL CA: CPT | Performed by: OBSTETRICS & GYNECOLOGY

## 2022-03-26 PROCEDURE — 99024 POSTOP FOLLOW-UP VISIT: CPT | Performed by: OBSTETRICS & GYNECOLOGY

## 2022-03-26 RX ORDER — SENNOSIDES 8.6 MG
1 TABLET ORAL
Status: DISCONTINUED | OUTPATIENT
Start: 2022-03-26 | End: 2022-03-28 | Stop reason: HOSPADM

## 2022-03-26 RX ADMIN — SODIUM CHLORIDE 500 ML: 0.9 INJECTION, SOLUTION INTRAVENOUS at 00:12

## 2022-03-26 RX ADMIN — ACETAMINOPHEN 975 MG: 325 TABLET ORAL at 00:42

## 2022-03-26 RX ADMIN — SODIUM CHLORIDE 125 ML/HR: 0.9 INJECTION, SOLUTION INTRAVENOUS at 10:12

## 2022-03-26 RX ADMIN — ACETAMINOPHEN 975 MG: 325 TABLET ORAL at 17:41

## 2022-03-26 RX ADMIN — KETOROLAC TROMETHAMINE 15 MG: 30 INJECTION, SOLUTION INTRAMUSCULAR at 12:52

## 2022-03-26 RX ADMIN — SODIUM CHLORIDE 500 ML: 0.9 INJECTION, SOLUTION INTRAVENOUS at 04:18

## 2022-03-26 RX ADMIN — SENNOSIDES 8.6 MG: 8.6 TABLET ORAL at 21:35

## 2022-03-26 RX ADMIN — KETOROLAC TROMETHAMINE 15 MG: 30 INJECTION, SOLUTION INTRAMUSCULAR at 00:42

## 2022-03-26 RX ADMIN — KETOROLAC TROMETHAMINE 15 MG: 30 INJECTION, SOLUTION INTRAMUSCULAR at 23:46

## 2022-03-26 RX ADMIN — ACETAMINOPHEN 975 MG: 325 TABLET ORAL at 23:46

## 2022-03-26 RX ADMIN — ACETAMINOPHEN 975 MG: 325 TABLET ORAL at 12:47

## 2022-03-26 RX ADMIN — KETOROLAC TROMETHAMINE 15 MG: 30 INJECTION, SOLUTION INTRAMUSCULAR at 17:41

## 2022-03-26 NOTE — PROGRESS NOTES
Gyn Oncology Progress note   Bonny Andersen 27 y o  female MRN: [id number]  Unit/Bed#: E5 -01 Encounter: [id number]    Assessment: 27 y o  POD# 1 from open L oophorectomy w/ benign frozen pathology (normal ovarian tissue)  Plan:  Hgb 14 4 --> f/u AM CBC  Pain: Tylenol & Toradol yarely, dPCA --> transition to PO pain medication  Doyle catheter in place --> plan to remove today  FEN: CLD, advance as tolerated, IVF until regular PO intake   DVT ppx: SCDs  F/u final pathology      Subjective:  Bonny Andersen has no current complaints  Pain is well controlled  Patient is voiding via doyle  She is not ambulating  Patient is not currently passing flatus and has had no bowel movement  She is tolerating PO, and denies nausea or vomitting  Patient denies fever, chills, chest pain, shortness of breath, or calf tenderness  BP 91/56   Pulse 87   Temp 98 °F (36 7 °C)   Resp 16   Ht 5' 1" (1 549 m)   Wt 54 1 kg (119 lb 4 3 oz)   SpO2 99%   BMI 22 54 kg/m²     I/O last 3 completed shifts: In: 3000 [I V :3000]  Out: 450 [Urine:200; Blood:250]  No intake/output data recorded  Lab Results   Component Value Date    WBC 5 94 02/26/2022    HGB 14 4 02/26/2022    HCT 45 6 02/26/2022    MCV 84 02/26/2022     02/26/2022       Lab Results   Component Value Date    GLUCOSE 103 05/27/2015    CALCIUM 9 9 02/26/2022     05/27/2015    K 3 5 02/26/2022    CO2 29 02/26/2022     02/26/2022    BUN 14 02/26/2022    CREATININE 0 74 02/26/2022           Physical Exam  Constitutional:       Appearance: Normal appearance  HENT:      Head: Normocephalic and atraumatic  Eyes:      Extraocular Movements: Extraocular movements intact  Cardiovascular:      Rate and Rhythm: Normal rate and regular rhythm  Pulmonary:      Effort: Pulmonary effort is normal       Breath sounds: Normal breath sounds  Abdominal:      General: There is no distension  Palpations: Abdomen is soft  Tenderness:  There is no abdominal tenderness  There is no guarding  Comments: Vertical incision c/d/i   Musculoskeletal:         General: Normal range of motion  Right lower leg: No edema  Left lower leg: No edema  Neurological:      Mental Status: She is alert  Mental status is at baseline     Psychiatric:         Mood and Affect: Mood normal          Behavior: Behavior normal            Daniel Almanzar MD  3/26/2022  4:26 AM

## 2022-03-26 NOTE — NURSING NOTE
Pt Tachycardic resting in 110's and soft BP 90's/60's OB provider made aware  Provider verbalized 500 ml bolus of NS  Bolus given and follow-up VS given to provider  Provider verbalized to give another 500 ml bolus of NS  Bolus given pt tolerated well and is stable

## 2022-03-26 NOTE — PLAN OF CARE
Problem: PAIN - ADULT  Goal: Verbalizes/displays adequate comfort level or baseline comfort level  Description: Interventions:  - Encourage patient to monitor pain and request assistance  - Assess pain using appropriate pain scale  - Administer analgesics based on type and severity of pain and evaluate response  - Implement non-pharmacological measures as appropriate and evaluate response  - Consider cultural and social influences on pain and pain management  - Notify physician/advanced practitioner if interventions unsuccessful or patient reports new pain  Outcome: Progressing     Problem: INFECTION - ADULT  Goal: Absence or prevention of progression during hospitalization  Description: INTERVENTIONS:  - Assess and monitor for signs and symptoms of infection  - Monitor lab/diagnostic results  - Monitor all insertion sites, i e  indwelling lines, tubes, and drains  - Monitor endotracheal if appropriate and nasal secretions for changes in amount and color  - [place] appropriate cooling/warming therapies per order  - Administer medications as ordered  - Instruct and encourage patient and family to use good hand hygiene technique  - Identify and instruct in appropriate isolation precautions for identified infection/condition  Outcome: Progressing  Goal: Absence of fever/infection during neutropenic period  Description: INTERVENTIONS:  - Monitor WBC    Outcome: Progressing     Problem: SAFETY ADULT  Goal: Patient will remain free of falls  Description: INTERVENTIONS:  - Educate patient/family on patient safety including physical limitations  - Instruct patient to call for assistance with activity   - Consult OT/PT to assist with strengthening/mobility   - Keep Call bell within reach  - Keep bed low and locked with side rails adjusted as appropriate  - Keep care items and personal belongings within reach  - Initiate and maintain comfort rounds  - Make Fall Risk Sign visible to staff  - Offer Toileting every 2 Hours, in advance of need  - Initiate/Maintain bed alarm  - Obtain necessary fall risk management equipment:   - Apply yellow socks and bracelet for high fall risk patients  - Consider moving patient to room near nurses station  Outcome: Progressing  Goal: Maintain or return to baseline ADL function  Description: INTERVENTIONS:  -  Assess patient's ability to carry out ADLs; assess patient's baseline for ADL function and identify physical deficits which impact ability to perform ADLs (bathing, care of mouth/teeth, toileting, grooming, dressing, etc )  - Assess/evaluate cause of self-care deficits   - Assess range of motion  - Assess patient's mobility; develop plan if impaired  - Assess patient's need for assistive devices and provide as appropriate  - Encourage maximum independence but intervene and supervise when necessary  - Involve family in performance of ADLs  - Assess for home care needs following discharge   - Consider OT consult to assist with ADL evaluation and planning for discharge  - Provide patient education as appropriate  Outcome: Progressing  Goal: Maintains/Returns to pre admission functional level  Description: INTERVENTIONS:  - Perform BMAT or MOVE assessment daily    - Set and communicate daily mobility goal to care team and patient/family/caregiver  - Collaborate with rehabilitation services on mobility goals if consulted  - Perform Range of Motion 5 times a day  - Reposition patient every 2 hours    - Dangle patient 3 times a day  - Stand patient 3 times a day  - Ambulate patient 3 times a day  - Out of bed to chair 3 times a day   - Out of bed for meals 3 times a day  - Out of bed for toileting  - Record patient progress and toleration of activity level   Outcome: Progressing     Problem: DISCHARGE PLANNING  Goal: Discharge to home or other facility with appropriate resources  Description: INTERVENTIONS:  - Identify barriers to discharge w/patient and caregiver  - Arrange for needed discharge resources and transportation as appropriate  - Identify discharge learning needs (meds, wound care, etc )  - Arrange for interpretive services to assist at discharge as needed  - Refer to Case Management Department for coordinating discharge planning if the patient needs post-hospital services based on physician/advanced practitioner order or complex needs related to functional status, cognitive ability, or social support system  Outcome: Progressing     Problem: Knowledge Deficit  Goal: Patient/family/caregiver demonstrates understanding of disease process, treatment plan, medications, and discharge instructions  Description: Complete learning assessment and assess knowledge base    Interventions:  - Provide teaching at level of understanding  - Provide teaching via preferred learning methods  Outcome: Progressing

## 2022-03-26 NOTE — QUICK NOTE
Progress Note - OB/GYN  Jayce Musty 27 y o  female MRN: [id number]  Unit/Bed#: E5 -01 Encounter: [id number]      Subjective: The patient reports doing well and her pain is well controlled  She does not feel that she has needed to press the PCA button much  She is drinking and denies any nausea  She has not passed any gas or had a bowel movement  Her doyle remains in place at this time  She feels well overall  No CP, SOB or leg pain  Vitals:   BP 90/59   Pulse (!) 106   Temp 98 8 °F (37 1 °C) (Oral)   Resp 18   Ht 5' 1" (1 549 m)   Wt 54 1 kg (119 lb 4 3 oz)   SpO2 96%   BMI 22 54 kg/m²       Intake/Output Summary (Last 24 hours) at 3/26/2022 0110  Last data filed at 3/25/2022 1858  Gross per 24 hour   Intake 3000 ml   Output 450 ml   Net 2550 ml       Invasive Devices  Report    Peripheral Intravenous Line            Peripheral IV 03/25/22 Left Hand <1 day    Peripheral IV 03/25/22 Right Forearm <1 day          Drain            Urethral Catheter Non-latex 16 Fr  <1 day                Physical Exam  Constitutional:       Appearance: Normal appearance  HENT:      Head: Normocephalic and atraumatic  Eyes:      Extraocular Movements: Extraocular movements intact  Cardiovascular:      Rate and Rhythm: Normal rate and regular rhythm  Pulmonary:      Effort: Pulmonary effort is normal       Breath sounds: Normal breath sounds  Abdominal:      General: There is no distension  Palpations: Abdomen is soft  Tenderness: There is no abdominal tenderness  There is no guarding  Comments: Vertical incision c/d/i   Musculoskeletal:         General: Normal range of motion  Skin:     General: Skin is warm and dry  Neurological:      Mental Status: She is alert  Mental status is at baseline     Psychiatric:         Mood and Affect: Mood normal          Behavior: Behavior normal            Labs:   Admission on 03/25/2022   Component Date Value    EXT Preg Test, Ur 03/25/2022 Negative     Control 03/25/2022 Valid     Case Report 03/25/2022                      Value:Surgical Pathology Report                         Case: [id number]                                   Authorizing Provider:  Iman Espinal MD       Collected:           03/25/2022 1638              Ordering Location:     [facility]        Received:            03/25/2022 [address],[facility] Operating Room                                                     Pathologist:           Shaina Ngo MD                                                         Intraop:               Shaina Ngo MD                                                         Specimen:    Ovary, Left                                                                                Intraoperative Consultat* 03/25/2022                      Value: This result contains rich text formatting which cannot be displayed here  Patient Active Problem List   Diagnosis    History of hemorrhoids    PTSD (post-traumatic stress disorder)    Tachycardia    Cyst of left ovary    S/P left oophorectomy        Assessment:  32yo POD#0 from open L oophorectomy w/ benign frozen (normal ovarian tissue)      Plan:  Postoperative care   F/u AM labs   Fox in place   Tylenol, Toradol and dPCA   CLD, advance as tolerated   Encourage ambulation       Reinaldo Nelson MD  3/26/2022  1:10 AM

## 2022-03-26 NOTE — PLAN OF CARE
Problem: PAIN - ADULT  Goal: Verbalizes/displays adequate comfort level or baseline comfort level  Description: Interventions:  - Encourage patient to monitor pain and request assistance  - Assess pain using appropriate pain scale  - Administer analgesics based on type and severity of pain and evaluate response  - Implement non-pharmacological measures as appropriate and evaluate response  - Consider cultural and social influences on pain and pain management  - Notify physician/advanced practitioner if interventions unsuccessful or patient reports new pain  Outcome: Progressing     Problem: INFECTION - ADULT  Goal: Absence or prevention of progression during hospitalization  Description: INTERVENTIONS:  - Assess and monitor for signs and symptoms of infection  - Monitor lab/diagnostic results  - Monitor all insertion sites, i e  indwelling lines, tubes, and drains  - Monitor endotracheal if appropriate and nasal secretions for changes in amount and color  - [place] appropriate cooling/warming therapies per order  - Administer medications as ordered  - Instruct and encourage patient and family to use good hand hygiene technique  - Identify and instruct in appropriate isolation precautions for identified infection/condition  Outcome: Progressing  Goal: Absence of fever/infection during neutropenic period  Description: INTERVENTIONS:  - Monitor WBC    Outcome: Progressing     Problem: SAFETY ADULT  Goal: Patient will remain free of falls  Description: INTERVENTIONS:  - Educate patient/family on patient safety including physical limitations  - Instruct patient to call for assistance with activity   - Consult OT/PT to assist with strengthening/mobility   - Keep Call bell within reach  - Keep bed low and locked with side rails adjusted as appropriate  - Keep care items and personal belongings within reach  - Initiate and maintain comfort rounds  - Make Fall Risk Sign visible to staff  - Apply yellow socks and bracelet for high fall risk patients  - Consider moving patient to room near nurses station  Outcome: Progressing  Goal: Maintain or return to baseline ADL function  Description: INTERVENTIONS:  -  Assess patient's ability to carry out ADLs; assess patient's baseline for ADL function and identify physical deficits which impact ability to perform ADLs (bathing, care of mouth/teeth, toileting, grooming, dressing, etc )  - Assess/evaluate cause of self-care deficits   - Assess range of motion  - Assess patient's mobility; develop plan if impaired  - Assess patient's need for assistive devices and provide as appropriate  - Encourage maximum independence but intervene and supervise when necessary  - Involve family in performance of ADLs  - Assess for home care needs following discharge   - Consider OT consult to assist with ADL evaluation and planning for discharge  - Provide patient education as appropriate  Outcome: Progressing  Goal: Maintains/Returns to pre admission functional level  Description: INTERVENTIONS:  - Perform BMAT or MOVE assessment daily    - Set and communicate daily mobility goal to care team and patient/family/caregiver     - Collaborate with rehabilitation services on mobility goals if consulted  - Out of bed for toileting  - Record patient progress and toleration of activity level   Outcome: Progressing     Problem: DISCHARGE PLANNING  Goal: Discharge to home or other facility with appropriate resources  Description: INTERVENTIONS:  - Identify barriers to discharge w/patient and caregiver  - Arrange for needed discharge resources and transportation as appropriate  - Identify discharge learning needs (meds, wound care, etc )  - Arrange for interpretive services to assist at discharge as needed  - Refer to Case Management Department for coordinating discharge planning if the patient needs post-hospital services based on physician/advanced practitioner order or complex needs related to functional status, cognitive ability, or social support system  Outcome: Progressing     Problem: Knowledge Deficit  Goal: Patient/family/caregiver demonstrates understanding of disease process, treatment plan, medications, and discharge instructions  Description: Complete learning assessment and assess knowledge base    Interventions:  - Provide teaching at level of understanding  - Provide teaching via preferred learning methods  Outcome: Progressing     Problem: Prexisting or High Potential for Compromised Skin Integrity  Goal: Skin integrity is maintained or improved  Description: INTERVENTIONS:  - Identify patients at risk for skin breakdown  - Assess and monitor skin integrity  - Assess and monitor nutrition and hydration status  - Monitor labs   - Assess for incontinence   - Turn and reposition patient  - Assist with mobility/ambulation  - Relieve pressure over bony prominences  - Avoid friction and shearing  - Provide appropriate hygiene as needed including keeping skin clean and dry  - Evaluate need for skin moisturizer/barrier cream  - Collaborate with interdisciplinary team   - Patient/family teaching  - Consider wound care consult   Outcome: Progressing     Problem: Potential for Falls  Goal: Patient will remain free of falls  Description: INTERVENTIONS:  - Educate patient/family on patient safety including physical limitations  - Instruct patient to call for assistance with activity   - Consult OT/PT to assist with strengthening/mobility   - Keep Call bell within reach  - Keep bed low and locked with side rails adjusted as appropriate  - Keep care items and personal belongings within reach  - Initiate and maintain comfort rounds  - Make Fall Risk Sign visible to staff  - Apply yellow socks and bracelet for high fall risk patients  - Consider moving patient to room near nurses station  Outcome: Progressing     Problem: MOBILITY - ADULT  Goal: Maintain or return to baseline ADL function  Description: INTERVENTIONS:  - Assess patient's ability to carry out ADLs; assess patient's baseline for ADL function and identify physical deficits which impact ability to perform ADLs (bathing, care of mouth/teeth, toileting, grooming, dressing, etc )  - Assess/evaluate cause of self-care deficits   - Assess range of motion  - Assess patient's mobility; develop plan if impaired  - Assess patient's need for assistive devices and provide as appropriate  - Encourage maximum independence but intervene and supervise when necessary  - Involve family in performance of ADLs  - Assess for home care needs following discharge   - Consider OT consult to assist with ADL evaluation and planning for discharge  - Provide patient education as appropriate  Outcome: Progressing  Goal: Maintains/Returns to pre admission functional level  Description: INTERVENTIONS:  - Perform BMAT or MOVE assessment daily    - Set and communicate daily mobility goal to care team and patient/family/caregiver     - Collaborate with rehabilitation services on mobility goals if consulted  - Out of bed for toileting  - Record patient progress and toleration of activity level   Outcome: Progressing

## 2022-03-27 LAB
ANION GAP SERPL CALCULATED.3IONS-SCNC: 7 MMOL/L (ref 4–13)
BUN SERPL-MCNC: 10 MG/DL (ref 5–25)
CALCIUM SERPL-MCNC: 8.2 MG/DL (ref 8.3–10.1)
CHLORIDE SERPL-SCNC: 105 MMOL/L (ref 100–108)
CO2 SERPL-SCNC: 24 MMOL/L (ref 21–32)
CREAT SERPL-MCNC: 0.71 MG/DL (ref 0.6–1.3)
ERYTHROCYTE [DISTWIDTH] IN BLOOD BY AUTOMATED COUNT: 15.1 % (ref 11.6–15.1)
GFR SERPL CREATININE-BSD FRML MDRD: 113 ML/MIN/1.73SQ M
GLUCOSE SERPL-MCNC: 81 MG/DL (ref 65–140)
HCT VFR BLD AUTO: 37 % (ref 34.8–46.1)
HGB BLD-MCNC: 11.9 G/DL (ref 11.5–15.4)
MCH RBC QN AUTO: 28.2 PG (ref 26.8–34.3)
MCHC RBC AUTO-ENTMCNC: 32.2 G/DL (ref 31.4–37.4)
MCV RBC AUTO: 88 FL (ref 82–98)
PLATELET # BLD AUTO: 185 THOUSANDS/UL (ref 149–390)
PMV BLD AUTO: 10.5 FL (ref 8.9–12.7)
POTASSIUM SERPL-SCNC: 3.2 MMOL/L (ref 3.5–5.3)
RBC # BLD AUTO: 4.22 MILLION/UL (ref 3.81–5.12)
SODIUM SERPL-SCNC: 136 MMOL/L (ref 136–145)
WBC # BLD AUTO: 5.94 THOUSAND/UL (ref 4.31–10.16)

## 2022-03-27 PROCEDURE — 99024 POSTOP FOLLOW-UP VISIT: CPT | Performed by: OBSTETRICS & GYNECOLOGY

## 2022-03-27 PROCEDURE — 85027 COMPLETE CBC AUTOMATED: CPT

## 2022-03-27 PROCEDURE — 80048 BASIC METABOLIC PNL TOTAL CA: CPT

## 2022-03-27 RX ORDER — OXYCODONE HYDROCHLORIDE 10 MG/1
10 TABLET ORAL EVERY 4 HOURS PRN
Status: DISCONTINUED | OUTPATIENT
Start: 2022-03-27 | End: 2022-03-28 | Stop reason: HOSPADM

## 2022-03-27 RX ORDER — OXYCODONE HYDROCHLORIDE 5 MG/1
5 TABLET ORAL EVERY 4 HOURS PRN
Qty: 20 TABLET | Refills: 0 | Status: SHIPPED | OUTPATIENT
Start: 2022-03-27 | End: 2022-04-06

## 2022-03-27 RX ORDER — POTASSIUM CHLORIDE 20 MEQ/1
40 TABLET, EXTENDED RELEASE ORAL ONCE
Status: COMPLETED | OUTPATIENT
Start: 2022-03-27 | End: 2022-03-27

## 2022-03-27 RX ORDER — POTASSIUM CHLORIDE 20 MEQ/1
20 TABLET, EXTENDED RELEASE ORAL ONCE
Status: COMPLETED | OUTPATIENT
Start: 2022-03-27 | End: 2022-03-27

## 2022-03-27 RX ADMIN — POTASSIUM CHLORIDE 20 MEQ: 20 TABLET, EXTENDED RELEASE ORAL at 07:24

## 2022-03-27 RX ADMIN — POTASSIUM CHLORIDE 40 MEQ: 20 TABLET, EXTENDED RELEASE ORAL at 07:24

## 2022-03-27 RX ADMIN — ACETAMINOPHEN 975 MG: 325 TABLET ORAL at 05:53

## 2022-03-27 RX ADMIN — ACETAMINOPHEN 975 MG: 325 TABLET ORAL at 19:08

## 2022-03-27 RX ADMIN — OXYCODONE HYDROCHLORIDE 10 MG: 10 TABLET ORAL at 17:20

## 2022-03-27 RX ADMIN — ACETAMINOPHEN 975 MG: 325 TABLET ORAL at 12:53

## 2022-03-27 RX ADMIN — SENNOSIDES 8.6 MG: 8.6 TABLET ORAL at 21:55

## 2022-03-27 NOTE — PROGRESS NOTES
Gyn Oncology Progress note   Evelyn Smith 27 y o  female MRN: [id number]  Unit/Bed#: E5 -01 Encounter: [id number]    Assessment: 27 y o  POD#2 from open L oophorectomy w/ benign frozen pathology (normal ovarian tissue)  Plan:  Hgb 14 4 --> 8 3 --> 11 9  Pain: Tylenol & Toradol yarely, dPCA --> d/c dPCA today  S/p doyle catheter, passed VT yesterday   FEN: soft diet --> advance to regular die today; s/p IV fluids   K 3 2 --> repletion ordered  DVT ppx: SCDs  F/u final pathology      Subjective:  Evelyn Smith has no current complaints  She is tolerating PO, denies nausea or vomiting  Pain is well controlled  She passed her voiding trial  She is passing gas, has not had a bowel movement  She is ambulating the [address]  Patient denies fever, chills, chest pain, shortness of breath, or calf tenderness  /75   Pulse 85   Temp 99 5 °F (37 5 °C)   Resp 18   Ht 5' 1" (1 549 m)   Wt 54 1 kg (119 lb 4 3 oz)   SpO2 94%   BMI 22 54 kg/m²     I/O last 3 completed shifts: In: 1 4 [I V :1 4]  Out: 2100 [Urine:2100]  I/O this shift: In: 0 4 [I V :0 4]  Out: -     Lab Results   Component Value Date    WBC 5 94 03/27/2022    HGB 11 9 03/27/2022    HCT 37 0 03/27/2022    MCV 88 03/27/2022     03/27/2022       Lab Results   Component Value Date    GLUCOSE 103 05/27/2015    CALCIUM 8 2 (L) 03/27/2022     05/27/2015    K 3 2 (L) 03/27/2022    CO2 24 03/27/2022     03/27/2022    BUN 10 03/27/2022    CREATININE 0 71 03/27/2022           Physical Exam  Constitutional:       Appearance: Normal appearance  HENT:      Head: Normocephalic and atraumatic  Eyes:      Extraocular Movements: Extraocular movements intact  Cardiovascular:      Rate and Rhythm: Normal rate and regular rhythm  Pulmonary:      Effort: Pulmonary effort is normal       Breath sounds: Normal breath sounds  Abdominal:      General: There is no distension  Palpations: Abdomen is soft  Tenderness:  There is no abdominal tenderness  There is no guarding  Comments: Vertical incision c/d/i   Musculoskeletal:         General: Normal range of motion  Right lower leg: No edema  Left lower leg: No edema  Neurological:      Mental Status: She is alert  Mental status is at baseline     Psychiatric:         Mood and Affect: Mood normal          Behavior: Behavior normal            Melania Swenson MD  3/27/2022  7:44 AM

## 2022-03-27 NOTE — UTILIZATION REVIEW
Initial Clinical Review  UNABLE TO FAX THROUGH [facility]  Elective inpatient surgical procedure  Age/Sex: 27 y o  female  Surgery Date: 03-25-22  Procedure: OPEN OVARIAN CYSTECTOMY (Left)  EXPLORATORY LAPAROTOMY   Anesthesia: general  Operative Findings:   Dense adhesions of all abdominal surface including small bowel to small bowel to anterior abdominal wall to uterus to left ovary  Left ovary was identified and a palpable nodule was within it  Due to the extensive lysis of adhesions the ovary was traumatized and was removed in total      POD#1 Progress Note: 03-26-22 POD# 1 from open L oophorectomy w/ benign frozen pathology (normal ovarian tissue)  doyle to be d/c not currently passing flatus and has had no bowel movement, dPCA --> transition to PO pain medication IVF @ 125 ml/hr    Admission Orders: Date/Time/Statement:   Admission Orders (From admission, onward)     Ordered        03/25/22 1927  Inpatient Admission  Once                      Orders Placed This Encounter   Procedures    Inpatient Admission     Standing Status:   Standing     Number of Occurrences:   1     Order Specific Question:   Level of Care     Answer:   Med Surg [16]     Order Specific Question:   Estimated length of stay     Answer:   More than 2 Midnights     Order Specific Question:   Certification     Answer:   I certify that inpatient services are medically necessary for this patient for a duration of greater than two midnights  See H&P and MD Progress Notes for additional information about the patient's course of treatment       Vital Signs: BP 98/67   Pulse 78   Temp 98 5 °F (36 9 °C)   Resp 18   Ht 5' 1" (1 549 m)   Wt 54 1 kg (119 lb 4 3 oz)   SpO2 96%   BMI 22 54 kg/m²     Pertinent Labs/Diagnostic Test Results:   No orders to display         Results from last 7 days   Lab Units 03/27/22  0446 03/26/22  0443   WBC Thousand/uL 5 94 6 30   HEMOGLOBIN g/dL 11 9 8 3*   HEMATOCRIT % 37 0 26 0*   PLATELETS Thousands/uL 185 141* NEUTROS ABS Thousands/µL  --  5 04         Results from last 7 days   Lab Units 03/27/22  0446 03/26/22  0821   SODIUM mmol/L 136 142   POTASSIUM mmol/L 3 2* 3 3*   CHLORIDE mmol/L 105 111*   CO2 mmol/L 24 21   ANION GAP mmol/L 7 10   BUN mg/dL 10 11   CREATININE mg/dL 0 71 0 85   EGFR ml/min/1 73sq m 113 90   CALCIUM mg/dL 8 2* 7 4*             Results from last 7 days   Lab Units 03/27/22  0446 03/26/22  0821   GLUCOSE RANDOM mg/dL 81 107   Diet: as tolerated  Mobility: oob  DVT Prophylaxis: SCD    Medications/Pain Control:   Scheduled Medications:  acetaminophen, 975 mg, Oral, Q6H JONEL  senna, 1 tablet, Oral, HS      Continuous IV Infusions:  IVF @ 125 ml/hr  IV dilaudid PCA pump      PRN Meds:  calcium carbonate, 1,000 mg, Oral, Daily PRN  diphenhydrAMINE, 25 mg, Oral, Q6H PRN  ondansetron, 4 mg, Intravenous, Q6H PRN  oxyCODONE, 10 mg, Oral, Q4H PRN  oxyCODONE, 10 mg, Oral, Q4H PRN        Network Utilization Review Department  ATTENTION: Please call with any questions or concerns to [phone] and carefully listen to the prompts so that you are directed to the right person  All voicemails are confidential   Lakisha Novoa all requests for admission clinical reviews, approved or denied determinations and any other requests to dedicated fax number below belonging to the campus where the patient is receiving treatment   List of dedicated fax numbers for the Facilities:  [address] DENIALS (Administrative/Medical Necessity) [phone]   [address] (Maternity/NICU/Pediatrics) [phone]   [address] [place] [address]  [address] [facility] [place] [address] [address] [address] [place] [address] [address] [phone]

## 2022-03-27 NOTE — NURSING NOTE
Stopped PCA pump per attending order  Wasted 43 ml of Dilaudid in med room with witness RN, Geoff Boggs

## 2022-03-27 NOTE — PLAN OF CARE
Problem: PAIN - ADULT  Goal: Verbalizes/displays adequate comfort level or baseline comfort level  Description: Interventions:  - Encourage patient to monitor pain and request assistance  - Assess pain using appropriate pain scale  - Administer analgesics based on type and severity of pain and evaluate response  - Implement non-pharmacological measures as appropriate and evaluate response  - Consider cultural and social influences on pain and pain management  - Notify physician/advanced practitioner if interventions unsuccessful or patient reports new pain  Outcome: Progressing     Problem: INFECTION - ADULT  Goal: Absence or prevention of progression during hospitalization  Description: INTERVENTIONS:  - Assess and monitor for signs and symptoms of infection  - Monitor lab/diagnostic results  - Monitor all insertion sites, i e  indwelling lines, tubes, and drains  - Monitor endotracheal if appropriate and nasal secretions for changes in amount and color  - [place] appropriate cooling/warming therapies per order  - Administer medications as ordered  - Instruct and encourage patient and family to use good hand hygiene technique  - Identify and instruct in appropriate isolation precautions for identified infection/condition  Outcome: Progressing  Goal: Absence of fever/infection during neutropenic period  Description: INTERVENTIONS:  - Monitor WBC    Outcome: Progressing     Problem: SAFETY ADULT  Goal: Patient will remain free of falls  Description: INTERVENTIONS:  - Educate patient/family on patient safety including physical limitations  - Instruct patient to call for assistance with activity   - Consult OT/PT to assist with strengthening/mobility   - Keep Call bell within reach  - Keep bed low and locked with side rails adjusted as appropriate  - Keep care items and personal belongings within reach  - Initiate and maintain comfort rounds  - Make Fall Risk Sign visible to staff  - Offer Toileting every 2 Hours, in advance of need  - Initiate/Maintain bed alarm  - Obtain necessary fall risk management equipment: bed alarm  - Apply yellow socks and bracelet for high fall risk patients  - Consider moving patient to room near nurses station  Outcome: Progressing  Goal: Maintain or return to baseline ADL function  Description: INTERVENTIONS:  -  Assess patient's ability to carry out ADLs; assess patient's baseline for ADL function and identify physical deficits which impact ability to perform ADLs (bathing, care of mouth/teeth, toileting, grooming, dressing, etc )  - Assess/evaluate cause of self-care deficits   - Assess range of motion  - Assess patient's mobility; develop plan if impaired  - Assess patient's need for assistive devices and provide as appropriate  - Encourage maximum independence but intervene and supervise when necessary  - Involve family in performance of ADLs  - Assess for home care needs following discharge   - Consider OT consult to assist with ADL evaluation and planning for discharge  - Provide patient education as appropriate  Outcome: Progressing  Goal: Maintains/Returns to pre admission functional level  Description: INTERVENTIONS:  - Perform BMAT or MOVE assessment daily    - Set and communicate daily mobility goal to care team and patient/family/caregiver  - Collaborate with rehabilitation services on mobility goals if consulted  - Perform Range of Motion 3 times a day  - Reposition patient every 2 hours    - Dangle patient 3 times a day  - Stand patient 3 times a day  - Ambulate patient 3 times a day  - Out of bed to chair 3 times a day   - Out of bed for meals 3 times a day  - Out of bed for toileting  - Record patient progress and toleration of activity level   Outcome: Progressing     Problem: DISCHARGE PLANNING  Goal: Discharge to home or other facility with appropriate resources  Description: INTERVENTIONS:  - Identify barriers to discharge w/patient and caregiver  - Arrange for needed discharge resources and transportation as appropriate  - Identify discharge learning needs (meds, wound care, etc )  - Arrange for interpretive services to assist at discharge as needed  - Refer to Case Management Department for coordinating discharge planning if the patient needs post-hospital services based on physician/advanced practitioner order or complex needs related to functional status, cognitive ability, or social support system  Outcome: Progressing     Problem: Knowledge Deficit  Goal: Patient/family/caregiver demonstrates understanding of disease process, treatment plan, medications, and discharge instructions  Description: Complete learning assessment and assess knowledge base    Interventions:  - Provide teaching at level of understanding  - Provide teaching via preferred learning methods  Outcome: Progressing     Problem: Prexisting or High Potential for Compromised Skin Integrity  Goal: Skin integrity is maintained or improved  Description: INTERVENTIONS:  - Identify patients at risk for skin breakdown  - Assess and monitor skin integrity  - Assess and monitor nutrition and hydration status  - Monitor labs   - Assess for incontinence   - Turn and reposition patient  - Assist with mobility/ambulation  - Relieve pressure over bony prominences  - Avoid friction and shearing  - Provide appropriate hygiene as needed including keeping skin clean and dry  - Evaluate need for skin moisturizer/barrier cream  - Collaborate with interdisciplinary team   - Patient/family teaching  - Consider wound care consult   Outcome: Progressing     Problem: Potential for Falls  Goal: Patient will remain free of falls  Description: INTERVENTIONS:  - Educate patient/family on patient safety including physical limitations  - Instruct patient to call for assistance with activity   - Consult OT/PT to assist with strengthening/mobility   - Keep Call bell within reach  - Keep bed low and locked with side rails adjusted as appropriate  - Keep care items and personal belongings within reach  - Initiate and maintain comfort rounds  - Make Fall Risk Sign visible to staff  - Offer Toileting every 2 Hours, in advance of need  - Initiate/Maintain bed alarm  - Obtain necessary fall risk management equipment: bed alarm  - Apply yellow socks and bracelet for high fall risk patients  - Consider moving patient to room near nurses station  Outcome: Progressing     Problem: MOBILITY - ADULT  Goal: Maintain or return to baseline ADL function  Description: INTERVENTIONS:  -  Assess patient's ability to carry out ADLs; assess patient's baseline for ADL function and identify physical deficits which impact ability to perform ADLs (bathing, care of mouth/teeth, toileting, grooming, dressing, etc )  - Assess/evaluate cause of self-care deficits   - Assess range of motion  - Assess patient's mobility; develop plan if impaired  - Assess patient's need for assistive devices and provide as appropriate  - Encourage maximum independence but intervene and supervise when necessary  - Involve family in performance of ADLs  - Assess for home care needs following discharge   - Consider OT consult to assist with ADL evaluation and planning for discharge  - Provide patient education as appropriate  Outcome: Progressing  Goal: Maintains/Returns to pre admission functional level  Description: INTERVENTIONS:  - Perform BMAT or MOVE assessment daily    - Set and communicate daily mobility goal to care team and patient/family/caregiver  - Collaborate with rehabilitation services on mobility goals if consulted  - Perform Range of Motion 3 times a day  - Reposition patient every 2 hours    - Dangle patient 3 times a day  - Stand patient 3 times a day  - Ambulate patient 3 times a day  - Out of bed to chair 3 times a day   - Out of bed for meals 3 times a day  - Out of bed for toileting  - Record patient progress and toleration of activity level   Outcome: Progressing

## 2022-03-27 NOTE — PLAN OF CARE
Problem: PAIN - ADULT  Goal: Verbalizes/displays adequate comfort level or baseline comfort level  Description: Interventions:  - Encourage patient to monitor pain and request assistance  - Assess pain using appropriate pain scale  - Administer analgesics based on type and severity of pain and evaluate response  - Implement non-pharmacological measures as appropriate and evaluate response  - Consider cultural and social influences on pain and pain management  - Notify physician/advanced practitioner if interventions unsuccessful or patient reports new pain  Outcome: Progressing     Problem: INFECTION - ADULT  Goal: Absence or prevention of progression during hospitalization  Description: INTERVENTIONS:  - Assess and monitor for signs and symptoms of infection  - Monitor lab/diagnostic results  - Monitor all insertion sites, i e  indwelling lines, tubes, and drains  - Monitor endotracheal if appropriate and nasal secretions for changes in amount and color  - [place] appropriate cooling/warming therapies per order  - Administer medications as ordered  - Instruct and encourage patient and family to use good hand hygiene technique  - Identify and instruct in appropriate isolation precautions for identified infection/condition  Outcome: Progressing  Goal: Absence of fever/infection during neutropenic period  Description: INTERVENTIONS:  - Monitor WBC    Outcome: Progressing     Problem: SAFETY ADULT  Goal: Patient will remain free of falls  Description: INTERVENTIONS:  - Educate patient/family on patient safety including physical limitations  - Instruct patient to call for assistance with activity   - Consult OT/PT to assist with strengthening/mobility   - Keep Call bell within reach  - Keep bed low and locked with side rails adjusted as appropriate  - Keep care items and personal belongings within reach  - Initiate and maintain comfort rounds  - Make Fall Risk Sign visible to staff  - Offer Toileting every  Hours, in advance of need  - Initiate/Maintain alarm  - Obtain necessary fall risk management equipment:   - Apply yellow socks and bracelet for high fall risk patients  - Consider moving patient to room near nurses station  Outcome: Progressing  Goal: Maintain or return to baseline ADL function  Description: INTERVENTIONS:  -  Assess patient's ability to carry out ADLs; assess patient's baseline for ADL function and identify physical deficits which impact ability to perform ADLs (bathing, care of mouth/teeth, toileting, grooming, dressing, etc )  - Assess/evaluate cause of self-care deficits   - Assess range of motion  - Assess patient's mobility; develop plan if impaired  - Assess patient's need for assistive devices and provide as appropriate  - Encourage maximum independence but intervene and supervise when necessary  - Involve family in performance of ADLs  - Assess for home care needs following discharge   - Consider OT consult to assist with ADL evaluation and planning for discharge  - Provide patient education as appropriate  Outcome: Progressing  Goal: Maintains/Returns to pre admission functional level  Description: INTERVENTIONS:  - Perform BMAT or MOVE assessment daily    - Set and communicate daily mobility goal to care team and patient/family/caregiver  - Collaborate with rehabilitation services on mobility goals if consulted  - Perform Range of Motion  times a day  - Reposition patient every  hours    - Dangle patient  times a day  - Stand patient  times a day  - Ambulate patient  times a day  - Out of bed to chair  times a day   - Out of bed for meals  times a day  - Out of bed for toileting  - Record patient progress and toleration of activity level   Outcome: Progressing     Problem: DISCHARGE PLANNING  Goal: Discharge to home or other facility with appropriate resources  Description: INTERVENTIONS:  - Identify barriers to discharge w/patient and caregiver  - Arrange for needed discharge resources and transportation as appropriate  - Identify discharge learning needs (meds, wound care, etc )  - Arrange for interpretive services to assist at discharge as needed  - Refer to Case Management Department for coordinating discharge planning if the patient needs post-hospital services based on physician/advanced practitioner order or complex needs related to functional status, cognitive ability, or social support system  Outcome: Progressing     Problem: Knowledge Deficit  Goal: Patient/family/caregiver demonstrates understanding of disease process, treatment plan, medications, and discharge instructions  Description: Complete learning assessment and assess knowledge base    Interventions:  - Provide teaching at level of understanding  - Provide teaching via preferred learning methods  Outcome: Progressing     Problem: Prexisting or High Potential for Compromised Skin Integrity  Goal: Skin integrity is maintained or improved  Description: INTERVENTIONS:  - Identify patients at risk for skin breakdown  - Assess and monitor skin integrity  - Assess and monitor nutrition and hydration status  - Monitor labs   - Assess for incontinence   - Turn and reposition patient  - Assist with mobility/ambulation  - Relieve pressure over bony prominences  - Avoid friction and shearing  - Provide appropriate hygiene as needed including keeping skin clean and dry  - Evaluate need for skin moisturizer/barrier cream  - Collaborate with interdisciplinary team   - Patient/family teaching  - Consider wound care consult   Outcome: Progressing     Problem: Potential for Falls  Goal: Patient will remain free of falls  Description: INTERVENTIONS:  - Educate patient/family on patient safety including physical limitations  - Instruct patient to call for assistance with activity   - Consult OT/PT to assist with strengthening/mobility   - Keep Call bell within reach  - Keep bed low and locked with side rails adjusted as appropriate  - Keep care items and personal belongings within reach  - Initiate and maintain comfort rounds  - Make Fall Risk Sign visible to staff  - Offer Toileting every  Hours, in advance of need  - Initiate/Maintain alarm  - Obtain necessary fall risk management equipment:   - Apply yellow socks and bracelet for high fall risk patients  - Consider moving patient to room near nurses station  Outcome: Progressing     Problem: MOBILITY - ADULT  Goal: Maintain or return to baseline ADL function  Description: INTERVENTIONS:  -  Assess patient's ability to carry out ADLs; assess patient's baseline for ADL function and identify physical deficits which impact ability to perform ADLs (bathing, care of mouth/teeth, toileting, grooming, dressing, etc )  - Assess/evaluate cause of self-care deficits   - Assess range of motion  - Assess patient's mobility; develop plan if impaired  - Assess patient's need for assistive devices and provide as appropriate  - Encourage maximum independence but intervene and supervise when necessary  - Involve family in performance of ADLs  - Assess for home care needs following discharge   - Consider OT consult to assist with ADL evaluation and planning for discharge  - Provide patient education as appropriate  Outcome: Progressing  Goal: Maintains/Returns to pre admission functional level  Description: INTERVENTIONS:  - Perform BMAT or MOVE assessment daily    - Set and communicate daily mobility goal to care team and patient/family/caregiver  - Collaborate with rehabilitation services on mobility goals if consulted  - Perform Range of Motion  times a day  - Reposition patient every  hours    - Dangle patient  times a day  - Stand patient  times a day  - Ambulate patient  times a day  - Out of bed to chair  times a day   - Out of bed for meal times a day  - Out of bed for toileting  - Record patient progress and toleration of activity level   Outcome: Progressing

## 2022-03-28 VITALS
WEIGHT: 119.27 LBS | HEART RATE: 82 BPM | HEIGHT: 61 IN | OXYGEN SATURATION: 98 % | DIASTOLIC BLOOD PRESSURE: 76 MMHG | SYSTOLIC BLOOD PRESSURE: 112 MMHG | RESPIRATION RATE: 18 BRPM | BODY MASS INDEX: 22.52 KG/M2 | TEMPERATURE: 99.2 F

## 2022-03-28 LAB
ANION GAP SERPL CALCULATED.3IONS-SCNC: 7 MMOL/L (ref 4–13)
BUN SERPL-MCNC: 8 MG/DL (ref 5–25)
CALCIUM SERPL-MCNC: 8.5 MG/DL (ref 8.3–10.1)
CHLORIDE SERPL-SCNC: 105 MMOL/L (ref 100–108)
CO2 SERPL-SCNC: 28 MMOL/L (ref 21–32)
CREAT SERPL-MCNC: 0.76 MG/DL (ref 0.6–1.3)
ERYTHROCYTE [DISTWIDTH] IN BLOOD BY AUTOMATED COUNT: 15.1 % (ref 11.6–15.1)
GFR SERPL CREATININE-BSD FRML MDRD: 104 ML/MIN/1.73SQ M
GLUCOSE SERPL-MCNC: 87 MG/DL (ref 65–140)
HCT VFR BLD AUTO: 39.3 % (ref 34.8–46.1)
HGB BLD-MCNC: 13 G/DL (ref 11.5–15.4)
MCH RBC QN AUTO: 27.8 PG (ref 26.8–34.3)
MCHC RBC AUTO-ENTMCNC: 33.1 G/DL (ref 31.4–37.4)
MCV RBC AUTO: 84 FL (ref 82–98)
PLATELET # BLD AUTO: 211 THOUSANDS/UL (ref 149–390)
PMV BLD AUTO: 9.9 FL (ref 8.9–12.7)
POTASSIUM SERPL-SCNC: 3.6 MMOL/L (ref 3.5–5.3)
RBC # BLD AUTO: 4.68 MILLION/UL (ref 3.81–5.12)
SODIUM SERPL-SCNC: 140 MMOL/L (ref 136–145)
WBC # BLD AUTO: 6.3 THOUSAND/UL (ref 4.31–10.16)

## 2022-03-28 PROCEDURE — 85027 COMPLETE CBC AUTOMATED: CPT

## 2022-03-28 PROCEDURE — 80048 BASIC METABOLIC PNL TOTAL CA: CPT

## 2022-03-28 PROCEDURE — 99024 POSTOP FOLLOW-UP VISIT: CPT | Performed by: OBSTETRICS & GYNECOLOGY

## 2022-03-28 PROCEDURE — NC001 PR NO CHARGE: Performed by: OBSTETRICS & GYNECOLOGY

## 2022-03-28 RX ORDER — ACETAMINOPHEN 325 MG/1
975 TABLET ORAL EVERY 6 HOURS SCHEDULED
Refills: 0
Start: 2022-03-28 | End: 2022-04-27 | Stop reason: ALTCHOICE

## 2022-03-28 RX ADMIN — ACETAMINOPHEN 975 MG: 325 TABLET ORAL at 05:49

## 2022-03-28 NOTE — PLAN OF CARE
Problem: PAIN - ADULT  Goal: Verbalizes/displays adequate comfort level or baseline comfort level  Description: Interventions:  - Encourage patient to monitor pain and request assistance  - Assess pain using appropriate pain scale  - Administer analgesics based on type and severity of pain and evaluate response  - Implement non-pharmacological measures as appropriate and evaluate response  - Consider cultural and social influences on pain and pain management  - Notify physician/advanced practitioner if interventions unsuccessful or patient reports new pain  Outcome: Progressing     Problem: INFECTION - ADULT  Goal: Absence or prevention of progression during hospitalization  Description: INTERVENTIONS:  - Assess and monitor for signs and symptoms of infection  - Monitor lab/diagnostic results  - Monitor all insertion sites, i e  indwelling lines, tubes, and drains  - Monitor endotracheal if appropriate and nasal secretions for changes in amount and color  - [place] appropriate cooling/warming therapies per order  - Administer medications as ordered  - Instruct and encourage patient and family to use good hand hygiene technique  - Identify and instruct in appropriate isolation precautions for identified infection/condition  Outcome: Progressing  Goal: Absence of fever/infection during neutropenic period  Description: INTERVENTIONS:  - Monitor WBC    Outcome: Progressing     Problem: SAFETY ADULT  Goal: Patient will remain free of falls  Description: INTERVENTIONS:  - Educate patient/family on patient safety including physical limitations  - Instruct patient to call for assistance with activity   - Consult OT/PT to assist with strengthening/mobility   - Keep Call bell within reach  - Keep bed low and locked with side rails adjusted as appropriate  - Keep care items and personal belongings within reach  - Initiate and maintain comfort rounds  - Make Fall Risk Sign visible to staff  - Offer Toileting every 2 Hours, in advance of need  - Initiate/Maintain bed alarm  - Obtain necessary fall risk management equipment: bed alarm  - Apply yellow socks and bracelet for high fall risk patients  - Consider moving patient to room near nurses station  Outcome: Progressing  Goal: Maintain or return to baseline ADL function  Description: INTERVENTIONS:  -  Assess patient's ability to carry out ADLs; assess patient's baseline for ADL function and identify physical deficits which impact ability to perform ADLs (bathing, care of mouth/teeth, toileting, grooming, dressing, etc )  - Assess/evaluate cause of self-care deficits   - Assess range of motion  - Assess patient's mobility; develop plan if impaired  - Assess patient's need for assistive devices and provide as appropriate  - Encourage maximum independence but intervene and supervise when necessary  - Involve family in performance of ADLs  - Assess for home care needs following discharge   - Consider OT consult to assist with ADL evaluation and planning for discharge  - Provide patient education as appropriate  Outcome: Progressing  Goal: Maintains/Returns to pre admission functional level  Description: INTERVENTIONS:  - Perform BMAT or MOVE assessment daily    - Set and communicate daily mobility goal to care team and patient/family/caregiver  - Collaborate with rehabilitation services on mobility goals if consulted  - Perform Range of Motion 3 times a day  - Reposition patient every 2 hours    - Dangle patient 3 times a day  - Stand patient 3 times a day  - Ambulate patient 3 times a day  - Out of bed to chair 3 times a day   - Out of bed for meals 3 times a day  - Out of bed for toileting  - Record patient progress and toleration of activity level   Outcome: Progressing     Problem: DISCHARGE PLANNING  Goal: Discharge to home or other facility with appropriate resources  Description: INTERVENTIONS:  - Identify barriers to discharge w/patient and caregiver  - Arrange for needed discharge resources and transportation as appropriate  - Identify discharge learning needs (meds, wound care, etc )  - Arrange for interpretive services to assist at discharge as needed  - Refer to Case Management Department for coordinating discharge planning if the patient needs post-hospital services based on physician/advanced practitioner order or complex needs related to functional status, cognitive ability, or social support system  Outcome: Progressing     Problem: Knowledge Deficit  Goal: Patient/family/caregiver demonstrates understanding of disease process, treatment plan, medications, and discharge instructions  Description: Complete learning assessment and assess knowledge base    Interventions:  - Provide teaching at level of understanding  - Provide teaching via preferred learning methods  Outcome: Progressing     Problem: Prexisting or High Potential for Compromised Skin Integrity  Goal: Skin integrity is maintained or improved  Description: INTERVENTIONS:  - Identify patients at risk for skin breakdown  - Assess and monitor skin integrity  - Assess and monitor nutrition and hydration status  - Monitor labs   - Assess for incontinence   - Turn and reposition patient  - Assist with mobility/ambulation  - Relieve pressure over bony prominences  - Avoid friction and shearing  - Provide appropriate hygiene as needed including keeping skin clean and dry  - Evaluate need for skin moisturizer/barrier cream  - Collaborate with interdisciplinary team   - Patient/family teaching  - Consider wound care consult   Outcome: Progressing     Problem: Potential for Falls  Goal: Patient will remain free of falls  Description: INTERVENTIONS:  - Educate patient/family on patient safety including physical limitations  - Instruct patient to call for assistance with activity   - Consult OT/PT to assist with strengthening/mobility   - Keep Call bell within reach  - Keep bed low and locked with side rails adjusted as appropriate  - Keep care items and personal belongings within reach  - Initiate and maintain comfort rounds  - Make Fall Risk Sign visible to staff  - Offer Toileting every 2 Hours, in advance of need  - Initiate/Maintain bed alarm  - Obtain necessary fall risk management equipment: bed alarm  - Apply yellow socks and bracelet for high fall risk patients  - Consider moving patient to room near nurses station  Outcome: Progressing     Problem: MOBILITY - ADULT  Goal: Maintain or return to baseline ADL function  Description: INTERVENTIONS:  -  Assess patient's ability to carry out ADLs; assess patient's baseline for ADL function and identify physical deficits which impact ability to perform ADLs (bathing, care of mouth/teeth, toileting, grooming, dressing, etc )  - Assess/evaluate cause of self-care deficits   - Assess range of motion  - Assess patient's mobility; develop plan if impaired  - Assess patient's need for assistive devices and provide as appropriate  - Encourage maximum independence but intervene and supervise when necessary  - Involve family in performance of ADLs  - Assess for home care needs following discharge   - Consider OT consult to assist with ADL evaluation and planning for discharge  - Provide patient education as appropriate  Outcome: Progressing  Goal: Maintains/Returns to pre admission functional level  Description: INTERVENTIONS:  - Perform BMAT or MOVE assessment daily    - Set and communicate daily mobility goal to care team and patient/family/caregiver  - Collaborate with rehabilitation services on mobility goals if consulted  - Perform Range of Motion 3 times a day  - Reposition patient every 2 hours    - Dangle patient 3 times a day  - Stand patient 3 times a day  - Ambulate patient 3 times a day  - Out of bed to chair 3 times a day   - Out of bed for meals 3 times a day  - Out of bed for toileting  - Record patient progress and toleration of activity level   Outcome: Progressing

## 2022-03-28 NOTE — DISCHARGE SUMMARY
Discharge Summary   Chris Stacy MRN: [id number]  Unit/Bed#: E5 -01 Encounter: [id number]      Admission Date: 3/25/2022     Discharge Date: 3/28/22    Attending: Yamileth Sanchez MD    Principal Diagnosis: Cyst of left ovary [N83 [id number]]    Procedures:   Exploratory laparotomy, open ovarian cystectomy    Hospital course: Patient is a 34yo G0 who presented to the emergency department in December of 2021 with symptoms of viral gastroenteritis  She had a CT scan that showed a 3 3 cm left ovarian mass  A follow-up U/S showed a 5cm L ovarian mass with calcifications consistent with a dermoid  She was admitted on 3/25/22 for open ovarian cystectomy due to the patient's extensive surgical history  She underwent an ex-lap and open ovarian cystectomy that was notable for dense adhesions along the entire surface of the abdomen, and between loops of small bowel, uterus, left ovary, and anterior abdominal wall  Due to the extensive lysis of adhesions, the ovary was traumatized and required excision  Frozen section at the time of surgery suggested benign pathology  The surgery was otherwise uncomplicated  On POD#1, she was transitioned to PO main medication and her doyle catheter was removed  Overnight, the patient was slightly tachycardic and her BP was low, so she was treated with 500cc NS bolus  On POD#2, she was advanced to a regular diet and was given KCl for potassium repletion  She was discharged on POD#3; her pain was controlled, she was tolerating PO, and she was having normal bowel movements      Lab Results:   Lab Results   Component Value Date    WBC 6 30 03/28/2022    HGB 13 0 03/28/2022    HCT 39 3 03/28/2022    MCV 84 03/28/2022     03/28/2022     Lab Results   Component Value Date    GLUCOSE 103 05/27/2015    CALCIUM 8 5 03/28/2022     05/27/2015    K 3 6 03/28/2022    CO2 28 03/28/2022     03/28/2022    BUN 8 03/28/2022    CREATININE 0 76 03/28/2022     Lab Results   Component Value Date/Time    POCGLU 106 12/17/2021 01:49 PM    POCGLU 89 11/25/2014 08:16 AM     Lab Results   Component Value Date    PTT 30 06/29/2020     Lab Results   Component Value Date    INR 1 05 06/29/2020    INR 1 02 05/27/2015    PROTIME 13 7 06/29/2020    PROTIME 13 2 19/46/8086       Complications: none apparent    Condition at discharge: stable     Discharge instructions/Information to patient and family:   See After Visit Summary for information provided to patient and family  Provisions for Follow-Up Care:  See After Visit Summary for information related to follow-up care and any pertinent home health orders  Disposition: See After Visit Summary for discharge disposition information  Planned Readmission: Yes, pending clinical status    Discharge Medications: For a complete list of the patient's medications, please refer to her med rec      Edgar Aden MD  3/28/2022  7:27 AM

## 2022-03-28 NOTE — NURSING NOTE
Agree with previous RN assessment  Pt denies any pain  Pt states she is ready to go home Will prepare pt for discharge as ordered

## 2022-03-28 NOTE — PROGRESS NOTES
For questions/concerns on this patient, please reach out to the followin AM - 5 PM [facility]-GynOnc- Resident/AP  5 PM - 6 AM: [facility]- Senior/Consult Resident  Gyn Oncology Progress note   Dana Rose 27 y o  female MRN: [id number]  Unit/Bed#: E5 -01 Encounter: [id number]    Assessment/Plan:    * S/P left oophorectomy  Assessment & Plan   POD#3 s/p open L oophorectomy w/ benign frozen (normal ovarian tissue)  Hgb 14 4 --> 8 3 --> 11 9 --> 13 0   Pain: Tylenol [address], Ann 5/10 PRN, s/p dPCA  S/p doyle catheter, voiding appropriately  FEN:  regular diet ; s/p IV fluids   K 3 2 --> 3 6 s/p repletion  DVT ppx: SCDs  F/u final pathology  Plan for d/c today       Normotensive, one low BP 89/57 on 3/27 @1507  HR 70-80s yesterday  Afebrile, SpO2 96-98% overnight  Overnight voids not recorded, 900cc/54 1kg/3 hours = 5 55cc/kg/hr yesterday morning    Subjective:    Dana Rose has no current complaints  Pain is well controlled with tylenol; she declined overnight  Patient is currently voiding  She is ambulating without issue  Patient is currently passing flatus and has had bowel movements  She is tolerating PO, and denies nausea or vomitting  Patient denies fever, chills, chest pain, shortness of breath, or calf tenderness  Objective:  /76   Pulse 82   Temp 99 2 °F (37 3 °C)   Resp 18   Ht 5' 1" (1 549 m)   Wt 54 1 kg (119 lb 4 3 oz)   SpO2 98%   BMI 22 54 kg/m²     I/O last 3 completed shifts: In: 2 [I V :2]  Out: 3200 [Urine:3200]  No intake/output data recorded      Lab Results   Component Value Date    WBC 6 30 2022    HGB 13 0 2022    HCT 39 3 2022    MCV 84 2022     2022       Lab Results   Component Value Date    GLUCOSE 103 2015    CALCIUM 8 5 2022     2015    K 3 6 2022    CO2 28 2022     2022    BUN 8 2022    CREATININE 0 76 2022       Physical Exam  Constitutional:       General: She is not in acute distress  Appearance: Normal appearance  Cardiovascular:      Rate and Rhythm: Normal rate and regular rhythm  Heart sounds: Normal heart sounds  No murmur heard  No friction rub  No gallop  Pulmonary:      Effort: Pulmonary effort is normal  No respiratory distress  Breath sounds: Normal breath sounds  No stridor  No wheezing, rhonchi or rales  Abdominal:      General: There is no distension  Palpations: Abdomen is soft  Tenderness: There is no abdominal tenderness  There is no guarding or rebound  Comments: Vertical midline incision C/D/I, no surrounding erythema   Musculoskeletal:         General: No swelling or tenderness  Skin:     Coloration: Skin is not pale  Findings: No rash  Neurological:      Mental Status: She is alert           Marquez Thorne MD  3/28/2022  6:58 AM

## 2022-03-29 ENCOUNTER — TELEPHONE (OUTPATIENT)
Dept: FAMILY MEDICINE CLINIC | Facility: CLINIC | Age: 32
End: 2022-03-29

## 2022-03-29 NOTE — TELEPHONE ENCOUNTER
----- Message from Harjinder Barreto sent at 3/29/2022  9:07 AM EDT -----  Patient is being discharged from their inpatient hospitalization today  Patients is insured by [facility] and requires a follow up TCM appointment within 7 days of discharge  Please contact this patient to schedule appointment       Thank you    Inpatient Care Management

## 2022-03-30 ENCOUNTER — TRANSITIONAL CARE MANAGEMENT (OUTPATIENT)
Dept: FAMILY MEDICINE CLINIC | Facility: CLINIC | Age: 32
End: 2022-03-30

## 2022-03-30 NOTE — TELEPHONE ENCOUNTER
I called and spoke with the pt whom refused to follow up with us as the pt will be seeing her GYN doctor   Appt cancelled

## 2022-04-11 ENCOUNTER — OFFICE VISIT (OUTPATIENT)
Dept: GYNECOLOGIC ONCOLOGY | Facility: CLINIC | Age: 32
End: 2022-04-11

## 2022-04-11 VITALS
TEMPERATURE: 98.9 F | WEIGHT: 119.6 LBS | HEIGHT: 61 IN | DIASTOLIC BLOOD PRESSURE: 70 MMHG | SYSTOLIC BLOOD PRESSURE: 116 MMHG | BODY MASS INDEX: 22.58 KG/M2

## 2022-04-11 DIAGNOSIS — Z90.721 S/P LEFT OOPHORECTOMY: Primary | ICD-10-CM

## 2022-04-11 PROCEDURE — 99024 POSTOP FOLLOW-UP VISIT: CPT | Performed by: OBSTETRICS & GYNECOLOGY

## 2022-04-11 NOTE — LETTER
April 11, 2022     Jasen Hartley MD  [address]    Patient: Winifred Gongora   YOB: 1990   Date of Visit: 4/11/2022       Dear Dr Prudence Briseno: Thank you for referring Winifred Gongora to me for evaluation  Below are my notes for this consultation  If you have questions, please do not hesitate to call me  I look forward to following your patient along with you  Sincerely,        David Wright MD        CC: No Recipients  David Wright MD  4/11/2022 10:37 AM  Signed  Assessment/Plan:    Problem List Items Addressed This Visit        Other    S/P left oophorectomy - Primary     Patient underwent exploratory laparotomy and left oophorectomy for enlarged persistent mucinous cyst adenofibroma as well as Bao Jerry tumor of the ovary  Due to significant scarring the ovary could not be saved  The patient has done well postoperatively she will turn to Dr Prudence Briseno for continued management  CHIEF COMPLAINT:  Postoperative evaluation        Patient ID: Winifred Gongora is a 27 y o  female  Patient is very pleasant 70-year-old female with history of multiple abdominal surgeries as an infant  She recently underwent open left oophorectomy due to a persistent mass  Final pathology report reveals the following:  Final Diagnosis  A  Left ovary:     - Mucinous cystadenofibroma with dystrophic calcifications      Lindaann Aurora tumor, 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  - Cystic follicles  - Tubo-ovarian adhesions  Postoperatively the patient did well  She remained in-house for approximately 2 days and presents today in follow-up  Today, the patient is doing well  She denies significant abdominal pain, pelvic pain, nausea, vomiting, constipation, diarrhea, fevers, chills, or vaginal bleeding        The following portions of the patient's history were reviewed and updated as appropriate: allergies, current medications, past medical history, past social history, past surgical history and problem list     Review of Systems   Constitutional: Negative  HENT: Negative  Eyes: Negative  Respiratory: Negative  Cardiovascular: Negative  Gastrointestinal: Negative  Endocrine: Negative  Genitourinary: Negative  Musculoskeletal: Negative  Skin: Negative  Neurological: Negative  Hematological: Negative  Psychiatric/Behavioral: Negative  Current Outpatient Medications   Medication Sig Dispense Refill    acetaminophen (TYLENOL) 325 mg tablet Take 3 tablets (975 mg total) by mouth every 6 (six) hours  0    multivitamin (THERAGRAN) TABS Take 1 tablet by mouth daily      ondansetron (ZOFRAN-ODT) 4 mg disintegrating tablet Take 1 tablet (4 mg total) by mouth every 8 (eight) hours as needed for nausea or vomiting 10 tablet 0     No current facility-administered medications for this visit  Objective:    Blood pressure 116/70, temperature 98 9 °F (37 2 °C), temperature source Tympanic, height 5' 1" (1 549 m), weight 54 3 kg (119 lb 9 6 oz)  Body mass index is 22 6 kg/m²  Body surface area is 1 52 meters squared  Physical Exam  Constitutional:       Appearance: She is well-developed  HENT:      Head: Normocephalic and atraumatic  Eyes:      Pupils: Pupils are equal, round, and reactive to light  Cardiovascular:      Rate and Rhythm: Normal rate and regular rhythm  Heart sounds: Normal heart sounds  Pulmonary:      Effort: Pulmonary effort is normal  No respiratory distress  Breath sounds: Normal breath sounds  Chest:   Breasts:      Right: No supraclavicular adenopathy  Left: No supraclavicular adenopathy  Abdominal:      General: Bowel sounds are normal  There is no distension  Palpations: Abdomen is soft  Abdomen is not rigid  Tenderness: There is no abdominal tenderness  There is no guarding or rebound        Comments: Well-healed midline incision   Genitourinary:     Comments: -Normal external female genitalia, normal Bartholin's and [place]'s glands                  -Normal midline urethral meatus  No lesions notes                  -Bladder without fullness mass or tenderness                  -Vagina without lesion or discharge No significant cystocele or rectocele noted                  -Cervix normal appearing without visible lesions                  -Uterus with normal contour, mobility  No tenderness,                  -Adnexae without  mass or tenderness                  - Anus without fissure of lesion    Musculoskeletal:         General: Normal range of motion  Cervical back: Normal range of motion and neck supple  Lymphadenopathy:      Cervical: No cervical adenopathy  Upper Body:      Right upper body: No supraclavicular adenopathy  Left upper body: No supraclavicular adenopathy  Skin:     General: Skin is warm and dry  Neurological:      Mental Status: She is alert and oriented to person, place, and time     Psychiatric:         Behavior: Behavior normal          Lab Results   Component Value Date     9 0 01/24/2022     Lab Results   Component Value Date     05/27/2015    K 3 6 03/28/2022     03/28/2022    CO2 28 03/28/2022    ANIONGAP 7 05/27/2015    BUN 8 03/28/2022    CREATININE 0 76 03/28/2022    GLUCOSE 103 05/27/2015    GLUF 87 02/26/2022    CALCIUM 8 5 03/28/2022    AST 39 02/26/2022    ALT 68 02/26/2022    ALKPHOS 102 02/26/2022    PROT 7 4 05/27/2015    BILITOT 1 18 (H) 05/27/2015    EGFR 104 03/28/2022     Lab Results   Component Value Date    WBC 6 30 03/28/2022    HGB 13 0 03/28/2022    HCT 39 3 03/28/2022    MCV 84 03/28/2022     03/28/2022     Lab Results   Component Value Date    NEUTROABS 5 04 03/26/2022        Trend:  Lab Results   Component Value Date     9 0 01/24/2022

## 2022-04-11 NOTE — PROGRESS NOTES
Assessment/Plan:    Problem List Items Addressed This Visit        Other    S/P left oophorectomy - Primary     Patient underwent exploratory laparotomy and left oophorectomy for enlarged persistent mucinous cyst adenofibroma as well as Shelva Amend tumor of the ovary  Due to significant scarring the ovary could not be saved  The patient has done well postoperatively she will turn to Dr Selene Ramos for continued management  CHIEF COMPLAINT:  Postoperative evaluation        Patient ID: Delmas Opitz is a 27 y o  female  Patient is very pleasant 76-year-old female with history of multiple abdominal surgeries as an infant  She recently underwent open left oophorectomy due to a persistent mass  Final pathology report reveals the following:  Final Diagnosis  A  Left ovary:     - Mucinous cystadenofibroma with dystrophic calcifications      Jimmy Ban tumor, 6 mm  - Cystic follicles  - Tubo-ovarian adhesions  Postoperatively the patient did well  She remained in-house for approximately 2 days and presents today in follow-up  Today, the patient is doing well  She denies significant abdominal pain, pelvic pain, nausea, vomiting, constipation, diarrhea, fevers, chills, or vaginal bleeding  The following portions of the patient's history were reviewed and updated as appropriate: allergies, current medications, past medical history, past social history, past surgical history and problem list     Review of Systems   Constitutional: Negative  HENT: Negative  Eyes: Negative  Respiratory: Negative  Cardiovascular: Negative  Gastrointestinal: Negative  Endocrine: Negative  Genitourinary: Negative  Musculoskeletal: Negative  Skin: Negative  Neurological: Negative  Hematological: Negative  Psychiatric/Behavioral: Negative          Current Outpatient Medications   Medication Sig Dispense Refill    acetaminophen (TYLENOL) 325 mg tablet Take 3 tablets (975 mg total) by mouth every 6 (six) hours  0    multivitamin (THERAGRAN) TABS Take 1 tablet by mouth daily      ondansetron (ZOFRAN-ODT) 4 mg disintegrating tablet Take 1 tablet (4 mg total) by mouth every 8 (eight) hours as needed for nausea or vomiting 10 tablet 0     No current facility-administered medications for this visit  Objective:    Blood pressure 116/70, temperature 98 9 °F (37 2 °C), temperature source Tympanic, height 5' 1" (1 549 m), weight 54 3 kg (119 lb 9 6 oz)  Body mass index is 22 6 kg/m²  Body surface area is 1 52 meters squared  Physical Exam  Constitutional:       Appearance: She is well-developed  HENT:      Head: Normocephalic and atraumatic  Eyes:      Pupils: Pupils are equal, round, and reactive to light  Cardiovascular:      Rate and Rhythm: Normal rate and regular rhythm  Heart sounds: Normal heart sounds  Pulmonary:      Effort: Pulmonary effort is normal  No respiratory distress  Breath sounds: Normal breath sounds  Chest:   Breasts:      Right: No supraclavicular adenopathy  Left: No supraclavicular adenopathy  Abdominal:      General: Bowel sounds are normal  There is no distension  Palpations: Abdomen is soft  Abdomen is not rigid  Tenderness: There is no abdominal tenderness  There is no guarding or rebound  Comments: Well-healed midline incision   Genitourinary:     Comments: -Normal external female genitalia, normal Bartholin's and [place]'s glands                  -Normal midline urethral meatus  No lesions notes                  -Bladder without fullness mass or tenderness                  -Vagina without lesion or discharge No significant cystocele or rectocele noted                  -Cervix normal appearing without visible lesions                  -Uterus with normal contour, mobility   No tenderness,                  -Adnexae without  mass or tenderness                  - Anus without fissure of lesion    Musculoskeletal: General: Normal range of motion  Cervical back: Normal range of motion and neck supple  Lymphadenopathy:      Cervical: No cervical adenopathy  Upper Body:      Right upper body: No supraclavicular adenopathy  Left upper body: No supraclavicular adenopathy  Skin:     General: Skin is warm and dry  Neurological:      Mental Status: She is alert and oriented to person, place, and time     Psychiatric:         Behavior: Behavior normal          Lab Results   Component Value Date     9 0 01/24/2022     Lab Results   Component Value Date     05/27/2015    K 3 6 03/28/2022     03/28/2022    CO2 28 03/28/2022    ANIONGAP 7 05/27/2015    BUN 8 03/28/2022    CREATININE 0 76 03/28/2022    GLUCOSE 103 05/27/2015    GLUF 87 02/26/2022    CALCIUM 8 5 03/28/2022    AST 39 02/26/2022    ALT 68 02/26/2022    ALKPHOS 102 02/26/2022    PROT 7 4 05/27/2015    BILITOT 1 18 (H) 05/27/2015    EGFR 104 03/28/2022     Lab Results   Component Value Date    WBC 6 30 03/28/2022    HGB 13 0 03/28/2022    HCT 39 3 03/28/2022    MCV 84 03/28/2022     03/28/2022     Lab Results   Component Value Date    NEUTROABS 5 04 03/26/2022        Trend:  Lab Results   Component Value Date     9 0 01/24/2022

## 2022-04-11 NOTE — ASSESSMENT & PLAN NOTE
Patient underwent exploratory laparotomy and left oophorectomy for enlarged persistent mucinous cyst adenofibroma as well as Genice Fried tumor of the ovary  Due to significant scarring the ovary could not be saved  The patient has done well postoperatively she will turn to Dr Waldo Espino for continued management

## 2022-04-27 ENCOUNTER — OFFICE VISIT (OUTPATIENT)
Dept: FAMILY MEDICINE CLINIC | Facility: CLINIC | Age: 32
End: 2022-04-27
Payer: COMMERCIAL

## 2022-04-27 VITALS
BODY MASS INDEX: 23 KG/M2 | HEIGHT: 61 IN | WEIGHT: 121.8 LBS | OXYGEN SATURATION: 99 % | HEART RATE: 97 BPM | RESPIRATION RATE: 12 BRPM | SYSTOLIC BLOOD PRESSURE: 122 MMHG | DIASTOLIC BLOOD PRESSURE: 70 MMHG

## 2022-04-27 DIAGNOSIS — E55.9 VITAMIN D DEFICIENCY: ICD-10-CM

## 2022-04-27 DIAGNOSIS — F43.10 PTSD (POST-TRAUMATIC STRESS DISORDER): ICD-10-CM

## 2022-04-27 DIAGNOSIS — Z87.19 HISTORY OF HEMORRHOIDS: ICD-10-CM

## 2022-04-27 DIAGNOSIS — Z13.89 SCREENING FOR BLOOD OR PROTEIN IN URINE: ICD-10-CM

## 2022-04-27 DIAGNOSIS — Z13.220 SCREENING FOR HYPERLIPIDEMIA: ICD-10-CM

## 2022-04-27 DIAGNOSIS — Z12.4 SCREENING FOR CERVICAL CANCER: Primary | ICD-10-CM

## 2022-04-27 DIAGNOSIS — N83.202 CYST OF LEFT OVARY: ICD-10-CM

## 2022-04-27 DIAGNOSIS — Z13.1 SCREENING FOR DIABETES MELLITUS: ICD-10-CM

## 2022-04-27 PROCEDURE — 1036F TOBACCO NON-USER: CPT | Performed by: INTERNAL MEDICINE

## 2022-04-27 PROCEDURE — 3008F BODY MASS INDEX DOCD: CPT | Performed by: INTERNAL MEDICINE

## 2022-04-27 PROCEDURE — 99204 OFFICE O/P NEW MOD 45 MIN: CPT | Performed by: INTERNAL MEDICINE

## 2022-04-27 RX ORDER — VENLAFAXINE HYDROCHLORIDE 37.5 MG/1
37.5 CAPSULE, EXTENDED RELEASE ORAL
Qty: 30 CAPSULE | Refills: 0 | Status: SHIPPED | OUTPATIENT
Start: 2022-04-27 | End: 2022-05-19

## 2022-04-27 NOTE — ASSESSMENT & PLAN NOTE
Start venlafaxine 37 5 mg daily  Side effects discussed  She will update me in 3 weeks on her symptoms

## 2022-04-27 NOTE — PROGRESS NOTES
Assessment/Plan:    PTSD (post-traumatic stress disorder)  Start venlafaxine 37 5 mg daily  Side effects discussed  She will update me in 3 weeks on her symptoms  History of hemorrhoids  She had surgical procedure for that  Currently resolved  Cyst of left ovary  She follows up with Oncology  Status post left oophorectomy       Diagnoses and all orders for this visit:    Screening for cervical cancer    Vitamin D deficiency  -     Vitamin D 25 hydroxy; Future    Screening for diabetes mellitus  -     HEMOGLOBIN A1C W/ EAG ESTIMATION; Future    Screening for blood or protein in urine  -     UA (URINE) with reflex to Scope    Screening for hyperlipidemia  -     Lipid panel; Future    PTSD (post-traumatic stress disorder)  -     venlafaxine (EFFEXOR-XR) 37 5 mg 24 hr capsule; Take 1 capsule (37 5 mg total) by mouth daily with breakfast    History of hemorrhoids    Cyst of left ovary          Subjective:      Patient ID: Yolande Corcoran is a 27 y o  female  Patient came to establish care  She is up-to-date on her vaccinations  She does not smoke, do not drink alcohol  She had history of left oophoreectomy due to mucinous adenofibroma  Also she had history of hemorrhoids with profuse bleeding, she developed posttraumatic stress disorder after that  She said that she is very anxious when she is using bathroom  She is not constipated in the bleeding is currently resolved but she still feeling very anxious        The following portions of the patient's history were reviewed and updated as appropriate: allergies, current medications, past family history, past medical history, past social history, past surgical history, and problem list     Review of Systems      Objective:      /70 (BP Location: Left arm, Patient Position: Sitting, Cuff Size: Standard)   Pulse 97   Resp 12   Ht 5' 1" (1 549 m)   Wt 55 2 kg (121 lb 12 8 oz)   SpO2 99%   BMI 23 01 kg/m²     Allergies   Allergen Reactions    Latex Other (See Comments)     Condoms  Burning sensation wears normal underwear and can eat banans           Current Outpatient Medications:     multivitamin (THERAGRAN) TABS, Take 1 tablet by mouth daily, Disp: , Rfl:     venlafaxine (EFFEXOR-XR) 37 5 mg 24 hr capsule, Take 1 capsule (37 5 mg total) by mouth daily with breakfast, Disp: 30 capsule, Rfl: 0     There are no Patient Instructions on file for this visit  Physical Exam  Constitutional:       General: She is not in acute distress  Appearance: Normal appearance  She is not ill-appearing  HENT:      Nose: No rhinorrhea  Cardiovascular:      Rate and Rhythm: Normal rate and regular rhythm  Heart sounds: No murmur heard  No friction rub  No gallop  Pulmonary:      Effort: No respiratory distress  Breath sounds: No wheezing or rhonchi  Chest:      Chest wall: No tenderness  Abdominal:      General: There is no distension  Palpations: There is no mass  Tenderness: There is no abdominal tenderness  There is no guarding or rebound  Hernia: No hernia is present  Musculoskeletal:         General: No swelling or tenderness  Lymphadenopathy:      Cervical: No cervical adenopathy  Skin:     Coloration: Skin is not jaundiced  Findings: No rash  Neurological:      Mental Status: She is alert and oriented to person, place, and time  Motor: No weakness        Gait: Gait normal    Psychiatric:         Mood and Affect: Mood normal          Behavior: Behavior normal

## 2022-05-19 DIAGNOSIS — F43.10 PTSD (POST-TRAUMATIC STRESS DISORDER): ICD-10-CM

## 2022-05-19 RX ORDER — VENLAFAXINE HYDROCHLORIDE 37.5 MG/1
CAPSULE, EXTENDED RELEASE ORAL
Qty: 30 CAPSULE | Refills: 0 | Status: SHIPPED | OUTPATIENT
Start: 2022-05-19 | End: 2022-06-02

## 2022-06-29 ENCOUNTER — APPOINTMENT (OUTPATIENT)
Dept: LAB | Facility: MEDICAL CENTER | Age: 32
End: 2022-06-29
Payer: COMMERCIAL

## 2022-06-29 DIAGNOSIS — Z32.00 PREGNANCY EXAMINATION OR TEST, PREGNANCY UNCONFIRMED: ICD-10-CM

## 2022-06-29 LAB — B-HCG SERPL-ACNC: <2 MIU/ML

## 2022-06-29 PROCEDURE — 36415 COLL VENOUS BLD VENIPUNCTURE: CPT

## 2022-06-29 PROCEDURE — 84702 CHORIONIC GONADOTROPIN TEST: CPT

## 2023-01-16 ENCOUNTER — OFFICE VISIT (OUTPATIENT)
Dept: OBGYN CLINIC | Facility: MEDICAL CENTER | Age: 33
End: 2023-01-16

## 2023-01-16 VITALS — WEIGHT: 128 LBS | HEIGHT: 61 IN | BODY MASS INDEX: 24.17 KG/M2

## 2023-01-16 DIAGNOSIS — N97.9 INFERTILITY, FEMALE: Primary | ICD-10-CM

## 2023-01-16 NOTE — PROGRESS NOTES
A/P    1   infertility    Trying x 13 years   States sometimes gets a faint + then cycle comes down    Never been pregnant      Number of partners tried with    Cycles - reports as regular      Things has tired - pt reports that she had surgery as a new borne and again laparotomy and was told a lot of adhesion and her tubes were blocked had the left side removed  because us such        Willing to try [facility] - info for [facility] given so that she can speak to them they are affordable

## 2023-07-17 ENCOUNTER — TELEPHONE (OUTPATIENT)
Dept: OBGYN CLINIC | Facility: MEDICAL CENTER | Age: 33
End: 2023-07-17

## 2023-07-17 DIAGNOSIS — N83.202 CYST OF LEFT OVARY: Primary | ICD-10-CM

## 2023-07-17 NOTE — TELEPHONE ENCOUNTER
Pt needs a follow up US pelvis complete w transvaginal script put in per Vito's recommendation to do another US three months from the last one. Please review, thank you.

## 2023-07-18 ENCOUNTER — HOSPITAL ENCOUNTER (OUTPATIENT)
Dept: ULTRASOUND IMAGING | Facility: MEDICAL CENTER | Age: 33
Discharge: HOME | End: 2023-07-18
Payer: COMMERCIAL

## 2023-07-18 DIAGNOSIS — N83.202 CYST OF LEFT OVARY: ICD-10-CM

## 2023-07-18 PROCEDURE — 76830 TRANSVAGINAL US NON-OB: CPT

## 2023-07-18 PROCEDURE — 76856 US EXAM PELVIC COMPLETE: CPT

## 2023-11-13 ENCOUNTER — TELEPHONE (OUTPATIENT)
Dept: OBGYN CLINIC | Facility: MEDICAL CENTER | Age: 33
End: 2023-11-13

## 2023-11-13 NOTE — TELEPHONE ENCOUNTER
Pt called in to see if pelvic us was needed. States that she was told to have another completed in 3 months but on previous us no rec made for 3 month f/u.  Please review and contact patient

## 2023-11-16 DIAGNOSIS — N83.201 RIGHT OVARIAN CYST: Primary | ICD-10-CM

## 2023-12-13 ENCOUNTER — TELEPHONE (OUTPATIENT)
Dept: OBGYN CLINIC | Facility: MEDICAL CENTER | Age: 33
End: 2023-12-13

## 2023-12-13 ENCOUNTER — APPOINTMENT (OUTPATIENT)
Dept: LAB | Facility: MEDICAL CENTER | Age: 33
End: 2023-12-13
Payer: COMMERCIAL

## 2023-12-13 DIAGNOSIS — N92.6 MISSED MENSES: Primary | ICD-10-CM

## 2023-12-13 DIAGNOSIS — N92.6 MISSED MENSES: ICD-10-CM

## 2023-12-13 LAB — B-HCG SERPL-ACNC: <1 MIU/ML (ref 0–5)

## 2023-12-13 PROCEDURE — 84702 CHORIONIC GONADOTROPIN TEST: CPT

## 2023-12-13 PROCEDURE — 36415 COLL VENOUS BLD VENIPUNCTURE: CPT

## 2023-12-13 NOTE — TELEPHONE ENCOUNTER
Returned patient's call. Patient reports her period is 3 days late. Patient reports her period is never late and always on time. Patient has taken several pregnancy tests and they have all been negative. Patient made aware that period may be late due to several reasons including stress. Patient states she only has one ovary and is worried she may have an ectopic pregnancy that is not showing on an urine test. Patient denies pain and/or any other symptoms that would suggest ectopic. Patient aware that even in the case of an ectopic she would still get a positive test due to rise in hcg. Patient verbalized understanding but she is till requesting order to be placed. Order placed.

## 2023-12-13 NOTE — TELEPHONE ENCOUNTER
Patient called stating she is 3 days late with her menstrual cycle. Her LMP was 11/11/2023. Has taken 4 urine pregnancy tests and all were negative. Stated she is concerned. Can labs be ordered?  Please advise

## 2023-12-15 ENCOUNTER — VBI (OUTPATIENT)
Dept: ADMINISTRATIVE | Facility: OTHER | Age: 33
End: 2023-12-15

## 2024-01-05 ENCOUNTER — OFFICE VISIT (OUTPATIENT)
Dept: URGENT CARE | Facility: MEDICAL CENTER | Age: 34
End: 2024-01-05
Payer: COMMERCIAL

## 2024-01-05 VITALS
OXYGEN SATURATION: 99 % | DIASTOLIC BLOOD PRESSURE: 79 MMHG | SYSTOLIC BLOOD PRESSURE: 120 MMHG | WEIGHT: 125.4 LBS | TEMPERATURE: 98.6 F | HEART RATE: 116 BPM | BODY MASS INDEX: 23.68 KG/M2 | RESPIRATION RATE: 18 BRPM | HEIGHT: 61 IN

## 2024-01-05 DIAGNOSIS — R68.89 FLU-LIKE SYMPTOMS: Primary | ICD-10-CM

## 2024-01-05 DIAGNOSIS — R52 BODY ACHES: ICD-10-CM

## 2024-01-05 LAB
SARS-COV-2 AG UPPER RESP QL IA: NEGATIVE
VALID CONTROL: NORMAL

## 2024-01-05 PROCEDURE — 87636 SARSCOV2 & INF A&B AMP PRB: CPT

## 2024-01-05 PROCEDURE — 87811 SARS-COV-2 COVID19 W/OPTIC: CPT

## 2024-01-05 PROCEDURE — 99213 OFFICE O/P EST LOW 20 MIN: CPT

## 2024-01-05 RX ORDER — OSELTAMIVIR PHOSPHATE 6 MG/ML
75 FOR SUSPENSION ORAL 2 TIMES DAILY
Qty: 125 ML | Refills: 0 | Status: SHIPPED | OUTPATIENT
Start: 2024-01-05 | End: 2024-01-10

## 2024-01-05 NOTE — PROGRESS NOTES
[facility]        NAME: Alexandra Verdugo is a 33 y.o. female  : 1990    MRN: [id number]  DATE: 2024  TIME: 10:16 AM    Assessment and Plan   Flu-like symptoms [R68.89]  1. Flu-like symptoms  oseltamivir (TAMIFLU) 6 mg/mL suspension      2. Body aches  Poct Covid 19 Rapid Antigen Test    Covid/Flu- Office Collect Normal        Rapid COVID test: Negative.    Presentation with flu-like symptoms. Prescribed course of Tamiflu. Advised patient may discontinue medication if influenza testing is negative. Advised symptomatic treatment.    Patient Instructions     Rapid COVID test: Negative.  COVID/Flu PCR sent out. If flu testing returns positive, continue Tamiflu as prescribed. If flu testing returns negative, discontinue Tamiflu.  Treat symptoms as below.  Fever/Body Aches: We recommend you take 600mg ibuprofen every 6 hours or tylenol 650mg every 6 hours as needed for fever. If needed, you can alternate these medications so that you take one medication every 3 hours. For instance, at noon take ibuprofen, then at 3pm take tylenol, then at 6pm take ibuprofen.   Cough: Delsym, an over the counter cough medication may be used every 12 hours as needed.  Mucinex XR (guafenisen) 600 mg tablets may be used to thin out the mucous to make it easier to cough up.  You may take 1-2 tablets twice per day as needed. Utilizing a vaporizer/humidifier may help, as well as increasing fluids.  Sore Throat: Salt water gargles with 1 teaspoon of salt dissolved in 6-8 oz of water as needed can help with a sore throat, as can honey, drinking plenty of liquids, eating soft foods. If severe, can utilize OTC chloraseptic spray.  Nasal Congestion: Over the counter allergy medication like Claritin, Allegra or Zyrtec can help with nasal congestion and post nasal drip.  Over the counter saline or steroid nasal sprays like Flonase can help with nasal congestion and post nasal drip as well. Over the counter decongestants such  as Sudafed may also help.  Upset Stomach: Drink plenty of fluids. May utilize Gatorade, Pedialyte, or other electrolyte solutions to supplement. Eat bland foods (ex: BRAT - bananas, rice, applesauce, toast) and slowly advance to other foods as tolerated.  Please note, if you have heart issues or high blood pressure, the cough/cold medication of choice is Coricidin. Talk to your doctor before trying any new medications.    Follow up with PCP in 3-5 days.  Proceed to  ER if symptoms worsen.    Chief Complaint     Chief Complaint   Patient presents with    Cold Like Symptoms     Pt c/o sore throat, body aches and cough productive for yel/green sputum for the last 2 days.  Denies fever.  Requesting covid testing.         History of Present Illness       URI   This is a new problem. Episode onset: 2 days ago. The problem has been gradually worsening. There has been no fever. Associated symptoms include congestion, coughing, ear pain (left), rhinorrhea and a sore throat. Pertinent negatives include no abdominal pain, diarrhea, nausea, vomiting or wheezing. Treatments tried: Daqyuil, ibuprofen, Nyquil. The treatment provided mild relief.       Review of Systems   Review of Systems   Constitutional:  Positive for appetite change (decreased) and chills. Negative for fever.   HENT:  Positive for congestion, ear pain (left), rhinorrhea and sore throat. Negative for ear discharge.    Eyes:  Positive for pain (burning). Negative for discharge, redness and itching.   Respiratory:  Positive for cough. Negative for chest tightness, shortness of breath and wheezing.    Gastrointestinal:  Negative for abdominal pain, diarrhea, nausea and vomiting.   Musculoskeletal:  Positive for myalgias.         Current Medications       Current Outpatient Medications:     multivitamin (THERAGRAN) TABS, Take 1 tablet by mouth daily, Disp: , Rfl:     oseltamivir (TAMIFLU) 6 mg/mL suspension, Take 12.5 mL (75 mg total) by mouth 2 (two) times a day  for 5 days, Disp: 125 mL, Rfl: 0    venlafaxine (EFFEXOR-XR) 37.5 mg 24 hr capsule, TAKE 1 CAPSULE BY MOUTH DAILY WITH BREAKFAST. (Patient not taking: Reported on 4/12/2023), Disp: 90 capsule, Rfl: 1    Current Allergies     Allergies as of 01/05/2024 - Reviewed 01/05/2024   Allergen Reaction Noted    Latex Other (See Comments) 03/05/2020            The following portions of the patient's history were reviewed and updated as appropriate: allergies, current medications, past family history, past medical history, past social history, past surgical history and problem list.     Past Medical History:   Diagnosis Date    Anemia     iron def    History of transfusion 06/2020    Irritable bowel syndrome     PONV (postoperative nausea and vomiting)        Past Surgical History:   Procedure Laterality Date    COLONOSCOPY      EXAMINATION UNDER ANESTHESIA N/A 7/8/2020    Procedure: EXAM UNDER ANESTHESIA (EUA), diagnostic anoscopy with control of bleeding;  Surgeon: Ashvin Valles MD;  [facility];  Service: Colorectal    EXPLORATORY LAPAROTOMY      SC COLONOSCOPY FLX DX W/COLLJ SPEC WHEN PFRMD N/A 8/1/2018    Procedure: COLONOSCOPY;  Surgeon: Ashvin Valles MD;  [facility];  Service: Colorectal    SC EXPLORATORY LAPAROTOMY CELIOTOMY W/WO BIOPSY SPX N/A 3/25/2022    Procedure: EXPLORATORY LAPAROTOMY;  Surgeon: Kayode Hutton MD;  [facility];  Service: Gynecology Oncology    SC HEMORRHOIDOPEXY STAPLING N/A 6/23/2020    Procedure: Hemorrhoidectomy, internal and external,3 column;  Surgeon: PARIS Lobo MD;  [facility];  Service: Colorectal    SC OVARIAN CYSTECTOMY UNI/BI Left 3/25/2022    Procedure: OPEN OVARIAN CYSTECTOMY;  Surgeon: Kayode Hutton MD;  [facility];  Service: Gynecology Oncology    STOMACH SURGERY      as an Infant    UPPER GASTROINTESTINAL ENDOSCOPY         Family History   Problem Relation Age of Onset    Diabetes Mother     HIV Father      "Colon cancer Neg Hx          Medications have been verified.        Objective   /79   Pulse (!) 116   Temp 98.6 °F (37 °C) (Tympanic)   Resp 18   Ht 5' 1\" (1.549 m)   Wt 56.9 kg (125 lb 6.4 oz)   LMP 12/17/2023 (Exact Date)   SpO2 99%   BMI 23.69 kg/m²        Physical Exam     Physical Exam  Vitals and nursing note reviewed.   Constitutional:       General: She is not in acute distress.     Appearance: Normal appearance. She is not ill-appearing.   HENT:      Right Ear: Tympanic membrane, ear canal and external ear normal.      Left Ear: Tympanic membrane, ear canal and external ear normal.      Nose: Congestion and rhinorrhea present.      Mouth/Throat:      Mouth: Mucous membranes are moist.      Pharynx: No oropharyngeal exudate or posterior oropharyngeal erythema.   Eyes:      General:         Right eye: No discharge.         Left eye: No discharge.      Extraocular Movements: Extraocular movements intact.   Cardiovascular:      Rate and Rhythm: Normal rate and regular rhythm.      Pulses: Normal pulses.      Heart sounds: Normal heart sounds.   Pulmonary:      Effort: Pulmonary effort is normal. No respiratory distress.      Breath sounds: Normal breath sounds. No wheezing, rhonchi or rales.   Musculoskeletal:      Cervical back: Neck supple. No tenderness.   Lymphadenopathy:      Cervical: No cervical adenopathy.   Skin:     General: Skin is warm and dry.   Neurological:      Mental Status: She is alert.                   "

## 2024-01-05 NOTE — PATIENT INSTRUCTIONS
Rapid COVID test: Negative.  COVID/Flu PCR sent out. If flu testing returns positive, continue Tamiflu as prescribed. If flu testing returns negative, discontinue Tamiflu.  Treat symptoms as below.  Fever/Body Aches: We recommend you take 600mg ibuprofen every 6 hours or tylenol 650mg every 6 hours as needed for fever. If needed, you can alternate these medications so that you take one medication every 3 hours. For instance, at noon take ibuprofen, then at 3pm take tylenol, then at 6pm take ibuprofen.   Cough: Delsym, an over the counter cough medication may be used every 12 hours as needed.  Mucinex XR (guafenisen) 600 mg tablets may be used to thin out the mucous to make it easier to cough up.  You may take 1-2 tablets twice per day as needed. Utilizing a vaporizer/humidifier may help, as well as increasing fluids.  Sore Throat: Salt water gargles with 1 teaspoon of salt dissolved in 6-8 oz of water as needed can help with a sore throat, as can honey, drinking plenty of liquids, eating soft foods. If severe, can utilize OTC chloraseptic spray.  Nasal Congestion: Over the counter allergy medication like Claritin, Allegra or Zyrtec can help with nasal congestion and post nasal drip.  Over the counter saline or steroid nasal sprays like Flonase can help with nasal congestion and post nasal drip as well. Over the counter decongestants such as Sudafed may also help.  Upset Stomach: Drink plenty of fluids. May utilize Gatorade, Pedialyte, or other electrolyte solutions to supplement. Eat bland foods (ex: BRAT - bananas, rice, applesauce, toast) and slowly advance to other foods as tolerated.  Please note, if you have heart issues or high blood pressure, the cough/cold medication of choice is Coricidin. Talk to your doctor before trying any new medications.    Follow up with PCP in 3-5 days.  Proceed to  ER if symptoms worsen.    Viral Syndrome   AMBULATORY CARE:   Viral syndrome  is a term used for symptoms of an  infection caused by a virus. Viruses are spread easily from person to person on shared items.  Signs and symptoms  may start slowly or suddenly and last hours to days. They can be mild to severe and can change over days or hours. You may have any of the following:  Fever and chills    A runny or stuffy nose    Cough, sore throat, or hoarseness    Headache, or pain and pressure around your eyes    Muscle aches and joint pain    Shortness of breath or wheezing    Abdominal pain, cramps, and diarrhea    Nausea, vomiting, or loss of appetite    Call your local emergency number (911 in the ), or have someone call if:   You have a seizure.    You cannot be woken.    You have chest pain or trouble breathing.    Seek care immediately if:   You have a stiff neck, a bad headache, and sensitivity to light.    You feel weak, dizzy, or confused.    You stop urinating or urinate a lot less than usual.    You cough up blood or thick yellow or green mucus.    You have severe abdominal pain or your abdomen is larger than usual.    Call your doctor if:   Your symptoms do not get better with treatment or get worse after 3 days.    You have a rash or ear pain.    You have burning when you urinate.    You have questions or concerns about your condition or care.    Treatment for viral syndrome  may include medicines to manage your symptoms. Antibiotics are not given for a viral infection. You may  need any of the following:  Acetaminophen  decreases pain and fever. It is available without a doctor's order. Ask how much to take and how often to take it. Follow directions. Read the labels of all other medicines you are using to see if they also contain acetaminophen, or ask your doctor or pharmacist. Acetaminophen can cause liver damage if not taken correctly.    NSAIDs , such as ibuprofen, help decrease swelling, pain, and fever. NSAIDs can cause stomach bleeding or kidney problems in certain people. If you take blood thinner medicine,  always ask your healthcare provider if NSAIDs are safe for you. Always read the medicine label and follow directions.    Cold medicine  helps decrease swelling, control a cough, and relieve chest or nasal congestion.    Saline nasal spray  helps decrease nasal congestion.    Manage your symptoms:   Drink liquids as directed to prevent dehydration.  Ask how much liquid to drink each day and which liquids are best for you. Do not drink liquids with caffeine. Caffeine can make dehydration worse.     Get plenty of rest to help your body heal.  Take naps throughout the day. Ask your healthcare provider when you can return to work and your normal activities.    Use a cool mist humidifier  to increase air moisture in your home. This may make it easier for you to breathe and help decrease your cough.     Drink tea with honey or use cough drops for a sore throat.  Cough drops are available without a doctor's order. Follow directions for taking cough drops.    Do not smoke or be close to anyone who is smoking.  Nicotine and other chemicals in cigarettes and cigars can cause lung damage. Smoking can also delay healing. Ask your healthcare provider for information if you currently smoke and need help to quit. E-cigarettes or smokeless tobacco still contain nicotine. Talk to your healthcare provider before you use these products.    Prevent the spread of germs:   Wash your hands often throughout the day.  Use soap and water. Rub your soapy hands together, lacing your fingers, for at least 20 seconds. Rinse with warm, running water. Dry your hands with a clean towel or paper towel. Use hand  that contains alcohol if soap and water are not available. Teach children how to wash their hands and use hand .         Cover sneezes and coughs.  Turn your face away and cover your mouth and nose with a tissue. Throw the tissue away. Use the bend of your arm if a tissue is not available. Then wash your hands well with soap  and water or use hand . Teach children how to cover a cough or sneeze.    Stay home while you are sick.  Avoid crowds as much as possible.    Get the influenza (flu) vaccine as soon as recommended each year.  The flu vaccine is available starting in September or October. Ask your healthcare provider about the pneumonia vaccine. This vaccine is usually recommended every 5 years in older adults.       Follow up with your doctor as directed:  Write down your questions so you remember to ask them during your visits.  © Copyright [employer] 2023 Information is for End User's use only and may not be sold, redistributed or otherwise used for commercial purposes.  The above information is an  only. It is not intended as medical advice for individual conditions or treatments. Talk to your doctor, nurse or pharmacist before following any medical regimen to see if it is safe and effective for you.

## 2024-01-05 NOTE — LETTER
January 5, 2024     Patient: Alexandra Verdugo   YOB: 1990   Date of Visit: 1/5/2024       To Whom it May Concern:    Alexandra Verdugo was seen in my clinic on 1/5/2024. She may return to work on 1/8/2024 .    If you have any questions or concerns, please don't hesitate to call.         Sincerely,          Shaylee Baker PA-C        CC: No Recipients

## 2024-01-06 LAB
FLUAV RNA RESP QL NAA+PROBE: POSITIVE
FLUBV RNA RESP QL NAA+PROBE: NEGATIVE
SARS-COV-2 RNA RESP QL NAA+PROBE: NEGATIVE

## 2024-01-15 ENCOUNTER — OFFICE VISIT (OUTPATIENT)
Dept: FAMILY MEDICINE CLINIC | Facility: CLINIC | Age: 34
End: 2024-01-15
Payer: COMMERCIAL

## 2024-01-15 ENCOUNTER — HOSPITAL ENCOUNTER (OUTPATIENT)
Dept: ULTRASOUND IMAGING | Facility: MEDICAL CENTER | Age: 34
Discharge: HOME | End: 2024-01-15
Payer: COMMERCIAL

## 2024-01-15 VITALS
SYSTOLIC BLOOD PRESSURE: 110 MMHG | RESPIRATION RATE: 12 BRPM | HEART RATE: 93 BPM | WEIGHT: 126.8 LBS | DIASTOLIC BLOOD PRESSURE: 70 MMHG | BODY MASS INDEX: 23.94 KG/M2 | HEIGHT: 61 IN | OXYGEN SATURATION: 98 %

## 2024-01-15 DIAGNOSIS — N83.201 RIGHT OVARIAN CYST: ICD-10-CM

## 2024-01-15 DIAGNOSIS — Z11.4 SCREENING FOR HIV (HUMAN IMMUNODEFICIENCY VIRUS): ICD-10-CM

## 2024-01-15 DIAGNOSIS — Z13.220 SCREENING FOR HYPERLIPIDEMIA: ICD-10-CM

## 2024-01-15 DIAGNOSIS — Z11.59 NEED FOR HEPATITIS C SCREENING TEST: ICD-10-CM

## 2024-01-15 DIAGNOSIS — R00.0 TACHYCARDIA: ICD-10-CM

## 2024-01-15 DIAGNOSIS — Z13.89 SCREENING FOR BLOOD OR PROTEIN IN URINE: ICD-10-CM

## 2024-01-15 DIAGNOSIS — Z00.00 ANNUAL PHYSICAL EXAM: Primary | ICD-10-CM

## 2024-01-15 DIAGNOSIS — Z13.0 SCREENING FOR DEFICIENCY ANEMIA: ICD-10-CM

## 2024-01-15 DIAGNOSIS — Z13.29 SCREENING FOR HYPOTHYROIDISM: ICD-10-CM

## 2024-01-15 DIAGNOSIS — Z13.1 SCREENING FOR DIABETES MELLITUS: ICD-10-CM

## 2024-01-15 PROCEDURE — 76856 US EXAM PELVIC COMPLETE: CPT

## 2024-01-15 PROCEDURE — 76830 TRANSVAGINAL US NON-OB: CPT

## 2024-01-15 PROCEDURE — 99395 PREV VISIT EST AGE 18-39: CPT | Performed by: INTERNAL MEDICINE

## 2024-01-15 NOTE — PROGRESS NOTES
Assessment/Plan:    No problem-specific Assessment & Plan notes found for this encounter.       Diagnoses and all orders for this visit:    Annual physical exam    Need for hepatitis C screening test  -     Hepatitis C Antibody; Future    Screening for HIV (human immunodeficiency virus)  -     HIV 1/2 AG/AB w Reflex [facility] for 2 yr old and above; Future    Screening for diabetes mellitus  -     Hemoglobin A1C; Future    Screening for blood or protein in urine  -     UA (URINE) with reflex to Scope    Screening for hyperlipidemia  -     Lipid panel; Future    Screening for deficiency anemia  -     CBC and differential; Future    Screening for hypothyroidism  -     TSH, 3rd generation with Free T4 reflex; Future    Tachycardia  -     Comprehensive metabolic panel; Future  -     Cancel: Vitamin D 25 hydroxy; Future          Subjective:      Patient ID: Alexandra Verdugo is a 33 y.o. female.    Today for annual checkup, vital signs are normal.  No family history of breast cancer or colon cancer.  She will follow-up with OB/GYN.  She does not smoke, does not drink alcohol.  She is healthy.  Up-to-date on tetanus shot.          The following portions of the patient's history were reviewed and updated as appropriate: allergies, current medications, past family history, past medical history, past social history, past surgical history, and problem list.    Review of Systems   Constitutional:  Negative for activity change, appetite change, chills, fatigue and fever.   HENT:  Negative for congestion, ear pain, rhinorrhea and sore throat.    Respiratory:  Negative for cough, shortness of breath and wheezing.    Cardiovascular:  Negative for chest pain, palpitations and leg swelling.   Gastrointestinal:  Negative for abdominal distention, abdominal pain, diarrhea, nausea and vomiting.   Genitourinary:  Negative for difficulty urinating, frequency and pelvic pain.   Musculoskeletal:  Negative for arthralgias, back pain and neck pain.  "  Skin:  Negative for rash.   Neurological:  Negative for dizziness, tremors, weakness, numbness and headaches.         Objective:      /70 (BP Location: Left arm, Patient Position: Sitting, Cuff Size: Standard)   Pulse 93   Resp 12   Ht 5' 1\" (1.549 m)   Wt 57.5 kg (126 lb 12.8 oz)   LMP 12/17/2023 (Exact Date)   SpO2 98%   BMI 23.96 kg/m²     Allergies   Allergen Reactions    Latex Other (See Comments)     Condoms  Burning sensation wears normal underwear and can eat banans           Current Outpatient Medications:     multivitamin (THERAGRAN) TABS, Take 1 tablet by mouth daily, Disp: , Rfl:      There are no Patient Instructions on file for this visit.        Physical Exam  Constitutional:       General: She is not in acute distress.     Appearance: Normal appearance. She is not ill-appearing.   HENT:      Nose: No rhinorrhea.   Cardiovascular:      Rate and Rhythm: Normal rate and regular rhythm.      Heart sounds: No murmur heard.     No friction rub. No gallop.   Pulmonary:      Effort: No respiratory distress.      Breath sounds: No wheezing or rhonchi.   Chest:      Chest wall: No tenderness.   Abdominal:      General: There is no distension.      Palpations: There is no mass.      Tenderness: There is no abdominal tenderness. There is no guarding or rebound.      Hernia: No hernia is present.   Musculoskeletal:         General: No swelling or tenderness.   Lymphadenopathy:      Cervical: No cervical adenopathy.   Skin:     Coloration: Skin is not jaundiced.      Findings: No rash.   Neurological:      Mental Status: She is alert and oriented to person, place, and time.      Motor: No weakness.      Gait: Gait normal.   Psychiatric:         Mood and Affect: Mood normal.         Behavior: Behavior normal.         "

## 2024-01-20 ENCOUNTER — APPOINTMENT (OUTPATIENT)
Dept: LAB | Facility: MEDICAL CENTER | Age: 34
End: 2024-01-20
Payer: COMMERCIAL

## 2024-01-20 DIAGNOSIS — Z13.220 SCREENING FOR HYPERLIPIDEMIA: ICD-10-CM

## 2024-01-20 DIAGNOSIS — Z13.0 SCREENING FOR DEFICIENCY ANEMIA: ICD-10-CM

## 2024-01-20 DIAGNOSIS — Z11.59 NEED FOR HEPATITIS C SCREENING TEST: ICD-10-CM

## 2024-01-20 DIAGNOSIS — Z13.29 SCREENING FOR HYPOTHYROIDISM: ICD-10-CM

## 2024-01-20 DIAGNOSIS — Z13.1 SCREENING FOR DIABETES MELLITUS: ICD-10-CM

## 2024-01-20 DIAGNOSIS — Z11.4 SCREENING FOR HIV (HUMAN IMMUNODEFICIENCY VIRUS): ICD-10-CM

## 2024-01-20 DIAGNOSIS — R00.0 TACHYCARDIA: ICD-10-CM

## 2024-01-20 LAB
ALBUMIN SERPL BCP-MCNC: 4.1 G/DL (ref 3.5–5)
ALP SERPL-CCNC: 105 U/L (ref 34–104)
ALT SERPL W P-5'-P-CCNC: 40 U/L (ref 7–52)
ANION GAP SERPL CALCULATED.3IONS-SCNC: 9 MMOL/L
AST SERPL W P-5'-P-CCNC: 29 U/L (ref 13–39)
BACTERIA UR QL AUTO: ABNORMAL /HPF
BASOPHILS # BLD AUTO: 0.04 THOUSANDS/ÂΜL (ref 0–0.1)
BASOPHILS NFR BLD AUTO: 1 % (ref 0–1)
BILIRUB SERPL-MCNC: 1.97 MG/DL (ref 0.2–1)
BILIRUB UR QL STRIP: NEGATIVE
BUN SERPL-MCNC: 10 MG/DL (ref 5–25)
CALCIUM SERPL-MCNC: 9.5 MG/DL (ref 8.4–10.2)
CHLORIDE SERPL-SCNC: 104 MMOL/L (ref 96–108)
CHOLEST SERPL-MCNC: 168 MG/DL
CLARITY UR: CLEAR
CO2 SERPL-SCNC: 28 MMOL/L (ref 21–32)
COLOR UR: YELLOW
CREAT SERPL-MCNC: 0.69 MG/DL (ref 0.6–1.3)
EOSINOPHIL # BLD AUTO: 0.09 THOUSAND/ÂΜL (ref 0–0.61)
EOSINOPHIL NFR BLD AUTO: 2 % (ref 0–6)
ERYTHROCYTE [DISTWIDTH] IN BLOOD BY AUTOMATED COUNT: 13.2 % (ref 11.6–15.1)
EST. AVERAGE GLUCOSE BLD GHB EST-MCNC: 105 MG/DL
GFR SERPL CREATININE-BSD FRML MDRD: 113 ML/MIN/1.73SQ M
GLUCOSE P FAST SERPL-MCNC: 83 MG/DL (ref 65–99)
GLUCOSE UR STRIP-MCNC: NEGATIVE MG/DL
HBA1C MFR BLD: 5.3 %
HCT VFR BLD AUTO: 46.4 % (ref 34.8–46.1)
HDLC SERPL-MCNC: 55 MG/DL
HGB BLD-MCNC: 15.1 G/DL (ref 11.5–15.4)
HGB UR QL STRIP.AUTO: ABNORMAL
IMM GRANULOCYTES # BLD AUTO: 0.01 THOUSAND/UL (ref 0–0.2)
IMM GRANULOCYTES NFR BLD AUTO: 0 % (ref 0–2)
KETONES UR STRIP-MCNC: NEGATIVE MG/DL
LDLC SERPL CALC-MCNC: 82 MG/DL (ref 0–100)
LEUKOCYTE ESTERASE UR QL STRIP: NEGATIVE
LYMPHOCYTES # BLD AUTO: 1.47 THOUSANDS/ÂΜL (ref 0.6–4.47)
LYMPHOCYTES NFR BLD AUTO: 28 % (ref 14–44)
MCH RBC QN AUTO: 28.1 PG (ref 26.8–34.3)
MCHC RBC AUTO-ENTMCNC: 32.5 G/DL (ref 31.4–37.4)
MCV RBC AUTO: 86 FL (ref 82–98)
MONOCYTES # BLD AUTO: 0.75 THOUSAND/ÂΜL (ref 0.17–1.22)
MONOCYTES NFR BLD AUTO: 14 % (ref 4–12)
MUCOUS THREADS UR QL AUTO: ABNORMAL
NEUTROPHILS # BLD AUTO: 2.84 THOUSANDS/ÂΜL (ref 1.85–7.62)
NEUTS SEG NFR BLD AUTO: 55 % (ref 43–75)
NITRITE UR QL STRIP: NEGATIVE
NON-SQ EPI CELLS URNS QL MICRO: ABNORMAL /HPF
NONHDLC SERPL-MCNC: 113 MG/DL
NRBC BLD AUTO-RTO: 0 /100 WBCS
PH UR STRIP.AUTO: 6.5 [PH]
PLATELET # BLD AUTO: 327 THOUSANDS/UL (ref 149–390)
PMV BLD AUTO: 11 FL (ref 8.9–12.7)
POTASSIUM SERPL-SCNC: 3.8 MMOL/L (ref 3.5–5.3)
PROT SERPL-MCNC: 7.5 G/DL (ref 6.4–8.4)
PROT UR STRIP-MCNC: ABNORMAL MG/DL
RBC # BLD AUTO: 5.37 MILLION/UL (ref 3.81–5.12)
RBC #/AREA URNS AUTO: ABNORMAL /HPF
SODIUM SERPL-SCNC: 141 MMOL/L (ref 135–147)
SP GR UR STRIP.AUTO: 1.03 (ref 1–1.03)
TRIGL SERPL-MCNC: 155 MG/DL
TSH SERPL DL<=0.05 MIU/L-ACNC: 1.5 UIU/ML (ref 0.45–4.5)
UROBILINOGEN UR STRIP-ACNC: <2 MG/DL
WBC # BLD AUTO: 5.2 THOUSAND/UL (ref 4.31–10.16)
WBC #/AREA URNS AUTO: ABNORMAL /HPF

## 2024-01-20 PROCEDURE — 83036 HEMOGLOBIN GLYCOSYLATED A1C: CPT

## 2024-01-20 PROCEDURE — 81001 URINALYSIS AUTO W/SCOPE: CPT | Performed by: INTERNAL MEDICINE

## 2024-01-20 PROCEDURE — 86803 HEPATITIS C AB TEST: CPT

## 2024-01-20 PROCEDURE — 80061 LIPID PANEL: CPT

## 2024-01-20 PROCEDURE — 36415 COLL VENOUS BLD VENIPUNCTURE: CPT

## 2024-01-20 PROCEDURE — 85025 COMPLETE CBC W/AUTO DIFF WBC: CPT

## 2024-01-20 PROCEDURE — 87389 HIV-1 AG W/HIV-1&-2 AB AG IA: CPT

## 2024-01-20 PROCEDURE — 80053 COMPREHEN METABOLIC PANEL: CPT

## 2024-01-20 PROCEDURE — 84443 ASSAY THYROID STIM HORMONE: CPT

## 2024-01-21 LAB
HCV AB SER QL: NORMAL
HIV 1+2 AB+HIV1 P24 AG SERPL QL IA: NORMAL
HIV 2 AB SERPL QL IA: NORMAL
HIV1 AB SERPL QL IA: NORMAL
HIV1 P24 AG SERPL QL IA: NORMAL

## 2024-02-08 ENCOUNTER — OFFICE VISIT (OUTPATIENT)
Age: 34
End: 2024-02-08
Payer: COMMERCIAL

## 2024-02-08 VITALS
DIASTOLIC BLOOD PRESSURE: 72 MMHG | HEIGHT: 61 IN | BODY MASS INDEX: 22.84 KG/M2 | SYSTOLIC BLOOD PRESSURE: 118 MMHG | WEIGHT: 121 LBS

## 2024-02-08 DIAGNOSIS — Z87.19 HISTORY OF HEMORRHOIDS: Primary | ICD-10-CM

## 2024-02-08 PROCEDURE — 99213 OFFICE O/P EST LOW 20 MIN: CPT | Performed by: COLON & RECTAL SURGERY

## 2024-02-08 NOTE — ASSESSMENT & PLAN NOTE
Patient presents for evaluation of rectal pain and discomfort.  Patient has a history of hemorrhoidectomy complicated by postoperative bleeding.  From this point of view she is doing well.  She notes intermittent anorectal discomfort described as burning or sharp shocking pain.  Examination shows small perianal skin tags.  No obvious recurrent hemorrhoidal disease.  She has levator level tenderness.    Based upon her symptoms and examination, I believe she has more of a functional process than a mechanical 1.  As such I recommend support.  High-fiber diet.  Avoidance of straining.  Warm soaks in the tub.  Nonsteroidal anti-inflammatory medications as needed.  If this becomes more disabling, pelvic floor physical therapy can be added.  Otherwise I have no other specific recommendations at this time.  She will return as needed.

## 2024-02-08 NOTE — PROGRESS NOTES
Diagnoses and all orders for this visit:    History of hemorrhoids       History of hemorrhoids  Patient presents for evaluation of rectal pain and discomfort.  Patient has a history of hemorrhoidectomy complicated by postoperative bleeding.  From this point of view she is doing well.  She notes intermittent anorectal discomfort described as burning or sharp shocking pain.  Examination shows small perianal skin tags.  No obvious recurrent hemorrhoidal disease.  She has levator level tenderness.    Based upon her symptoms and examination, I believe she has more of a functional process than a mechanical 1.  As such I recommend support.  High-fiber diet.  Avoidance of straining.  Warm soaks in the tub.  Nonsteroidal anti-inflammatory medications as needed.  If this becomes more disabling, pelvic floor physical therapy can be added.  Otherwise I have no other specific recommendations at this time.  She will return as needed.      TIFFANY Verdugo is here for evaluation of anal discomfort.     Last seen in the office on 2/25/2021 for rectal bleeding.     She is status post EUA, diagnostic anoscopy with control of bleeding on 07.08.2020.     She is status post hemorrhoidectomy, internal and external, 3 column on 06.23.2020 with Dr. Lobo.     Her most recent colonoscopy was 07.06.2020 which revealed,  - No active bleeding seen to suggest high volume GI bleed.  - Mild ooze at hemorrhoidectomy site.  Clips placed for compression with no - bleeding at completion of case.  Recommended recall as needed basis.    Past Medical History:   Diagnosis Date    Anemia     iron def    History of transfusion 06/2020    Irritable bowel syndrome     PONV (postoperative nausea and vomiting)      Past Surgical History:   Procedure Laterality Date    COLONOSCOPY      EXAMINATION UNDER ANESTHESIA N/A 7/8/2020    Procedure: EXAM UNDER ANESTHESIA (EUA), diagnostic anoscopy with control of bleeding;  Surgeon: Ashvin Valles MD;   [facility];  Service: Colorectal    EXPLORATORY LAPAROTOMY      [place] COLONOSCOPY FLX DX W/COLLJ SPEC WHEN PFRMD N/A 8/1/2018    Procedure: COLONOSCOPY;  Surgeon: Ashvin Valles MD;  [facility];  Service: Colorectal    AZ EXPLORATORY LAPAROTOMY CELIOTOMY W/WO BIOPSY SPX N/A 3/25/2022    Procedure: EXPLORATORY LAPAROTOMY;  Surgeon: Kayode Hutton MD;  [facility];  Service: Gynecology Oncology    AZ HEMORRHOIDOPEXY STAPLING N/A 6/23/2020    Procedure: Hemorrhoidectomy, internal and external,3 column;  Surgeon: PARIS Lobo MD;  [facility];  Service: Colorectal    AZ OVARIAN CYSTECTOMY UNI/BI Left 3/25/2022    Procedure: OPEN OVARIAN CYSTECTOMY;  Surgeon: Kayode Hutton MD;  [facility];  Service: Gynecology Oncology    STOMACH SURGERY      as an Infant    UPPER GASTROINTESTINAL ENDOSCOPY         Current Outpatient Medications:     multivitamin (THERAGRAN) TABS, Take 1 tablet by mouth daily, Disp: , Rfl:   Allergies as of 02/08/2024 - Reviewed 02/08/2024   Allergen Reaction Noted    Latex Other (See Comments) 03/05/2020     Review of Systems   Gastrointestinal:  Positive for rectal pain.   All other systems reviewed and are negative.    Vitals:    02/08/24 1127   BP: 118/72     Physical Exam  Constitutional:       Appearance: Normal appearance.   HENT:      Head: Normocephalic and atraumatic.   Eyes:      Extraocular Movements: Extraocular movements intact.      Pupils: Pupils are equal, round, and reactive to light.   Pulmonary:      Effort: Pulmonary effort is normal.   Musculoskeletal:         General: Normal range of motion.   Skin:     General: Skin is warm and dry.   Neurological:      General: No focal deficit present.      Mental Status: She is alert and oriented to person, place, and time.   Psychiatric:         Mood and Affect: Mood normal.         Behavior: Behavior normal.         Thought Content: Thought content normal.         Judgment: Judgment  normal.

## 2024-10-06 ENCOUNTER — APPOINTMENT (EMERGENCY)
Dept: CT IMAGING | Facility: HOSPITAL | Age: 34
End: 2024-10-06
Payer: COMMERCIAL

## 2024-10-06 ENCOUNTER — APPOINTMENT (EMERGENCY)
Dept: ULTRASOUND IMAGING | Facility: HOSPITAL | Age: 34
End: 2024-10-06
Payer: COMMERCIAL

## 2024-10-06 ENCOUNTER — HOSPITAL ENCOUNTER (EMERGENCY)
Facility: HOSPITAL | Age: 34
Discharge: HOME | End: 2024-10-06
Attending: EMERGENCY MEDICINE | Admitting: EMERGENCY MEDICINE
Payer: COMMERCIAL

## 2024-10-06 VITALS
HEART RATE: 82 BPM | DIASTOLIC BLOOD PRESSURE: 63 MMHG | OXYGEN SATURATION: 99 % | WEIGHT: 125 LBS | BODY MASS INDEX: 23.62 KG/M2 | RESPIRATION RATE: 18 BRPM | TEMPERATURE: 98.5 F | SYSTOLIC BLOOD PRESSURE: 101 MMHG

## 2024-10-06 DIAGNOSIS — R10.9 ABDOMINAL PAIN: ICD-10-CM

## 2024-10-06 DIAGNOSIS — R19.00 PELVIC MASS: Primary | ICD-10-CM

## 2024-10-06 LAB
ALBUMIN SERPL BCG-MCNC: 3.9 G/DL (ref 3.5–5)
ALP SERPL-CCNC: 60 U/L (ref 34–104)
ALT SERPL W P-5'-P-CCNC: 18 U/L (ref 7–52)
ANION GAP SERPL CALCULATED.3IONS-SCNC: 7 MMOL/L (ref 4–13)
AST SERPL W P-5'-P-CCNC: 25 U/L (ref 13–39)
BACTERIA UR QL AUTO: ABNORMAL /HPF
BASOPHILS # BLD AUTO: 0.05 THOUSANDS/ΜL (ref 0–0.1)
BASOPHILS NFR BLD AUTO: 1 % (ref 0–1)
BILIRUB DIRECT SERPL-MCNC: 0.12 MG/DL (ref 0–0.2)
BILIRUB SERPL-MCNC: 1.05 MG/DL (ref 0.2–1)
BILIRUB UR QL STRIP: NEGATIVE
BUN SERPL-MCNC: 14 MG/DL (ref 5–25)
CALCIUM SERPL-MCNC: 8.9 MG/DL (ref 8.4–10.2)
CHLORIDE SERPL-SCNC: 106 MMOL/L (ref 96–108)
CLARITY UR: CLEAR
CO2 SERPL-SCNC: 24 MMOL/L (ref 21–32)
COLOR UR: YELLOW
CREAT SERPL-MCNC: 0.72 MG/DL (ref 0.6–1.3)
EOSINOPHIL # BLD AUTO: 0.1 THOUSAND/ΜL (ref 0–0.61)
EOSINOPHIL NFR BLD AUTO: 1 % (ref 0–6)
ERYTHROCYTE [DISTWIDTH] IN BLOOD BY AUTOMATED COUNT: 12.8 % (ref 11.6–15.1)
EXT PREGNANCY TEST URINE: NEGATIVE
EXT. CONTROL: NORMAL
GFR SERPL CREATININE-BSD FRML MDRD: 109 ML/MIN/1.73SQ M
GLUCOSE SERPL-MCNC: 84 MG/DL (ref 65–140)
GLUCOSE UR STRIP-MCNC: NEGATIVE MG/DL
HCT VFR BLD AUTO: 42.5 % (ref 34.8–46.1)
HGB BLD-MCNC: 14.6 G/DL (ref 11.5–15.4)
HGB UR QL STRIP.AUTO: NEGATIVE
IMM GRANULOCYTES # BLD AUTO: 0.01 THOUSAND/UL (ref 0–0.2)
IMM GRANULOCYTES NFR BLD AUTO: 0 % (ref 0–2)
KETONES UR STRIP-MCNC: NEGATIVE MG/DL
LEUKOCYTE ESTERASE UR QL STRIP: ABNORMAL
LIPASE SERPL-CCNC: 28 U/L (ref 11–82)
LYMPHOCYTES # BLD AUTO: 2.83 THOUSANDS/ΜL (ref 0.6–4.47)
LYMPHOCYTES NFR BLD AUTO: 34 % (ref 14–44)
MCH RBC QN AUTO: 28.4 PG (ref 26.8–34.3)
MCHC RBC AUTO-ENTMCNC: 34.4 G/DL (ref 31.4–37.4)
MCV RBC AUTO: 83 FL (ref 82–98)
MONOCYTES # BLD AUTO: 0.81 THOUSAND/ΜL (ref 0.17–1.22)
MONOCYTES NFR BLD AUTO: 10 % (ref 4–12)
MUCOUS THREADS UR QL AUTO: ABNORMAL
NEUTROPHILS # BLD AUTO: 4.46 THOUSANDS/ΜL (ref 1.85–7.62)
NEUTS SEG NFR BLD AUTO: 54 % (ref 43–75)
NITRITE UR QL STRIP: NEGATIVE
NON-SQ EPI CELLS URNS QL MICRO: ABNORMAL /HPF
NRBC BLD AUTO-RTO: 0 /100 WBCS
PH UR STRIP.AUTO: 7 [PH] (ref 4.5–8)
PLATELET # BLD AUTO: 235 THOUSANDS/UL (ref 149–390)
PMV BLD AUTO: 9.8 FL (ref 8.9–12.7)
POTASSIUM SERPL-SCNC: 3.9 MMOL/L (ref 3.5–5.3)
PROT SERPL-MCNC: 6.9 G/DL (ref 6.4–8.4)
PROT UR STRIP-MCNC: NEGATIVE MG/DL
RBC # BLD AUTO: 5.14 MILLION/UL (ref 3.81–5.12)
RBC #/AREA URNS AUTO: ABNORMAL /HPF
SODIUM SERPL-SCNC: 137 MMOL/L (ref 135–147)
SP GR UR STRIP.AUTO: 1.02 (ref 1–1.03)
TRANS CELLS #/AREA URNS HPF: PRESENT /[HPF]
UROBILINOGEN UR QL STRIP.AUTO: 0.2 E.U./DL
WBC # BLD AUTO: 8.26 THOUSAND/UL (ref 4.31–10.16)
WBC #/AREA URNS AUTO: ABNORMAL /HPF

## 2024-10-06 PROCEDURE — 96374 THER/PROPH/DIAG INJ IV PUSH: CPT

## 2024-10-06 PROCEDURE — 80048 BASIC METABOLIC PNL TOTAL CA: CPT | Performed by: EMERGENCY MEDICINE

## 2024-10-06 PROCEDURE — 36415 COLL VENOUS BLD VENIPUNCTURE: CPT | Performed by: EMERGENCY MEDICINE

## 2024-10-06 PROCEDURE — 85025 COMPLETE CBC W/AUTO DIFF WBC: CPT | Performed by: EMERGENCY MEDICINE

## 2024-10-06 PROCEDURE — 74177 CT ABD & PELVIS W/CONTRAST: CPT

## 2024-10-06 PROCEDURE — 81001 URINALYSIS AUTO W/SCOPE: CPT

## 2024-10-06 PROCEDURE — 80076 HEPATIC FUNCTION PANEL: CPT | Performed by: EMERGENCY MEDICINE

## 2024-10-06 PROCEDURE — 96361 HYDRATE IV INFUSION ADD-ON: CPT

## 2024-10-06 PROCEDURE — 81025 URINE PREGNANCY TEST: CPT | Performed by: EMERGENCY MEDICINE

## 2024-10-06 PROCEDURE — 76856 US EXAM PELVIC COMPLETE: CPT

## 2024-10-06 PROCEDURE — 99285 EMERGENCY DEPT VISIT HI MDM: CPT | Performed by: EMERGENCY MEDICINE

## 2024-10-06 PROCEDURE — 99284 EMERGENCY DEPT VISIT MOD MDM: CPT

## 2024-10-06 PROCEDURE — 83690 ASSAY OF LIPASE: CPT | Performed by: EMERGENCY MEDICINE

## 2024-10-06 PROCEDURE — 76830 TRANSVAGINAL US NON-OB: CPT

## 2024-10-06 RX ORDER — KETOROLAC TROMETHAMINE 30 MG/ML
15 INJECTION, SOLUTION INTRAMUSCULAR; INTRAVENOUS ONCE
Status: COMPLETED | OUTPATIENT
Start: 2024-10-06 | End: 2024-10-06

## 2024-10-06 RX ADMIN — IOHEXOL 100 ML: 350 INJECTION, SOLUTION INTRAVENOUS at 20:13

## 2024-10-06 RX ADMIN — SODIUM CHLORIDE 1000 ML: 0.9 INJECTION, SOLUTION INTRAVENOUS at 18:55

## 2024-10-06 RX ADMIN — KETOROLAC TROMETHAMINE 15 MG: 30 INJECTION, SOLUTION INTRAMUSCULAR; INTRAVENOUS at 18:54

## 2024-10-06 NOTE — Clinical Note
Alexandra Verdugo was seen and treated in our emergency department on 10/6/2024.                Diagnosis:     Alexandra  may return to work on return date.    She may return on this date: 10/08/2024         If you have any questions or concerns, please don't hesitate to call.      Ashvin Peck MD    ______________________________           _______________          _______________  Hospital Representative                              Date                                Time

## 2024-10-07 NOTE — ED PROVIDER NOTES
Final diagnoses:   Pelvic mass   Abdominal pain     ED Disposition       ED Disposition   Discharge    Condition   Stable    Date/Time   Sun Oct 6, 2024 11:00 PM    Comment   Alexandra Verdugo discharge to home/self care.                   Assessment & Plan       Medical Decision Making  33-year-old female here for right lower quadrant pain.  She is a extensive surgical history as delineated below.  Pelvic ultrasound and CT imaging show right ovarian complex cyst unchanged from previous as well as a large cystic adnexal mass that was not mentioned on previous CT from 3 years ago.  Pain improved with Toradol and patient declined further pain meds.  No evidence of ovarian torsion on ultrasound and does not seem consistent with torsion based on history and physical exam.  Urinalysis was equivocal for UTI and patient denies any frequency, urgency, dysuria.  Will defer antibiotics pending culture.  Discussed with OB/GYN on-call who agree and recommend close follow-up with patient's previous gyn/onc surgeon.  Discussed this plan with patient who is in agreement.  We did discuss the possibility of intermittent torsion and the fact that patient is at risk for the same, stressed strict return precautions for any change in pain pattern or worsening of symptoms.    Amount and/or Complexity of Data Reviewed  Labs: ordered. Decision-making details documented in ED Course.  Radiology: ordered. Decision-making details documented in ED Course.    Risk  Prescription drug management.             Medications   sodium chloride 0.9 % bolus 1,000 mL (0 mL Intravenous Stopped 10/6/24 1955)   ketorolac (TORADOL) injection 15 mg (15 mg Intravenous Given 10/6/24 1854)   iohexol (OMNIPAQUE) 350 MG/ML injection (MULTI-DOSE) 100 mL (100 mL Intravenous Given 10/6/24 2013)       ED Risk Strat Scores                           SBIRT 20yo+      Flowsheet Row Most Recent Value   Initial Alcohol Screen: US AUDIT-C     1. How often do you have a drink  containing alcohol? 0 Filed at: 10/06/2024 1813   2. How many drinks containing alcohol do you have on a typical day you are drinking?  0 Filed at: 10/06/2024 1813   3a. Male UNDER 65: How often do you have five or more drinks on one occasion? 0 Filed at: 10/06/2024 1813   3b. FEMALE Any Age, or MALE 65+: How often do you have 4 or more drinks on one occassion? 0 Filed at: 10/06/2024 1813   Audit-C Score 0 Filed at: 10/06/2024 1813   ELIAN: How many times in the past year have you...    Used an illegal drug or used a prescription medication for non-medical reasons? Never Filed at: 10/06/2024 1813                            History of Present Illness       Chief Complaint   Patient presents with    Abdominal Pain     Pt reports right sided abd pain since Friday, denies N/V/D.       Past Medical History:   Diagnosis Date    Anemia     iron def    History of transfusion 06/2020    Irritable bowel syndrome     PONV (postoperative nausea and vomiting)       Past Surgical History:   Procedure Laterality Date    COLONOSCOPY      EXAMINATION UNDER ANESTHESIA N/A 7/8/2020    Procedure: EXAM UNDER ANESTHESIA (EUA), diagnostic anoscopy with control of bleeding;  Surgeon: Ashvin Valles MD;  Location: [facility];  Service: Colorectal    EXPLORATORY LAPAROTOMY      NV COLONOSCOPY FLX DX W/COLLJ SPEC WHEN PFRMD N/A 8/1/2018    Procedure: COLONOSCOPY;  Surgeon: Ashvin Valles MD;  Location: [facility];  Service: Colorectal    NV EXPLORATORY LAPAROTOMY CELIOTOMY W/WO BIOPSY SPX N/A 3/25/2022    Procedure: EXPLORATORY LAPAROTOMY;  Surgeon: Kayode Hutton MD;  Location: [facility];  Service: Gynecology Oncology    NV HEMORRHOIDOPEXY STAPLING N/A 6/23/2020    Procedure: Hemorrhoidectomy, internal and external,3 column;  Surgeon: PARIS Lobo MD;  Location: [facility];  Service: Colorectal    NV OVARIAN CYSTECTOMY UNI/BI Left 3/25/2022    Procedure: OPEN OVARIAN CYSTECTOMY;  Surgeon: Kayode Hutton MD;  Location: [facility];  Service: Gynecology Oncology    STOMACH SURGERY      as an Infant    UPPER GASTROINTESTINAL ENDOSCOPY        Family History   Problem Relation Age of Onset    Diabetes Mother     Diabetes type II Mother     HIV Father     Colon cancer Neg Hx       Social History     Tobacco Use    Smoking status: Never    Smokeless tobacco: Never   Vaping Use    Vaping status: Never Used   Substance Use Topics    Alcohol use: Not Currently    Drug use: Never      E-Cigarette/Vaping    E-Cigarette Use Never User       E-Cigarette/Vaping Substances    Nicotine No     THC No     CBD No     Flavoring No     Other No     Unknown No       I have reviewed and agree with the history as documented.     Alexandra is a very pleasant 34 yo F here for evaluation of RLQ pain.  She reports history of recurrent ovarian cysts and states that her pain today feels similar.  Pain is similar in character and intensity to previous episodes but has been lasting longer than previous episodes.  She has been having discomfort now for at least 2 days.  She has a complicated surgical history that included multiple abdominal surgeries as an infant.  In 2022 she had a laparotomy for a left ovarian cystectomy that required extensive lysis of adhesions and left oophorectomy with Dr. Hutton.  She has had pain in her right lower quadrant off and on but it typically does not last this long.  She has been taking Motrin off and on which seems to take the edge off but she continues to have pain.  She denies associated fevers, nausea, vomiting, urinary symptoms.      Abdominal Pain  Associated symptoms: no chest pain, no chills, no cough, no dysuria, no fever, no hematuria, no nausea, no shortness of breath and no vomiting        Review of Systems   Constitutional:  Negative for chills and fever.   Eyes:  Negative for visual disturbance.   Respiratory:  Negative for cough and shortness of breath.    Cardiovascular:  Negative for chest pain and palpitations.    Gastrointestinal:  Positive for abdominal pain. Negative for nausea and vomiting.   Genitourinary:  Negative for dysuria and hematuria.   Musculoskeletal:  Negative for arthralgias and back pain.   Skin:  Negative for color change and rash.   Neurological:  Negative for syncope.   All other systems reviewed and are negative.          Objective       ED Triage Vitals   Temperature Pulse Blood Pressure Respirations SpO2 Patient Position - Orthostatic VS   10/06/24 1657 10/06/24 1657 10/06/24 1657 10/06/24 1657 10/06/24 1657 10/06/24 1815   98.5 °F (36.9 °C) 95 145/71 16 98 % Sitting      Temp src Heart Rate Source BP Location FiO2 (%) Pain Score    -- 10/06/24 1657 10/06/24 1815 -- 10/06/24 1657     Monitor Right arm  8      Vitals      Date and Time Temp Pulse SpO2 Resp BP Pain Score FACES Pain Rating User   10/06/24 2100 -- 82 99 % 18 101/63 -- -- SS   10/06/24 2030 -- 82 99 % 18 101/58 -- -- SS   10/06/24 1900 -- 79 96 % 20 100/60 -- -- SS   10/06/24 1854 -- -- -- -- -- 8 -- SS   10/06/24 1815 -- 83 99 % 18 120/67 -- -- SS   10/06/24 1657 98.5 °F (36.9 °C) 95 98 % 16 145/71 8 -- DIC            Physical Exam  Vitals and nursing note reviewed.   Constitutional:       General: She is not in acute distress.     Appearance: She is well-developed.   HENT:      Head: Normocephalic and atraumatic.   Eyes:      Conjunctiva/sclera: Conjunctivae normal.   Cardiovascular:      Rate and Rhythm: Normal rate and regular rhythm.      Heart sounds: No murmur heard.  Pulmonary:      Effort: Pulmonary effort is normal. No respiratory distress.      Breath sounds: Normal breath sounds.   Abdominal:      Palpations: Abdomen is soft.      Tenderness: There is abdominal tenderness in the right lower quadrant. There is no guarding or rebound.      Comments: Multiple well-healed surgical scars noted.  Soft and nondistended, mild to moderate right lower quadrant tenderness without rebound or guarding.   Musculoskeletal:         General:  No swelling.      Cervical back: Neck supple.   Skin:     General: Skin is warm and dry.      Capillary Refill: Capillary refill takes less than 2 seconds.   Neurological:      Mental Status: She is alert.   Psychiatric:         Mood and Affect: Mood normal.         Results Reviewed       Procedure Component Value Units Date/Time    Basic metabolic panel [[id number]] Collected: 10/06/24 1852    Lab Status: Final result Specimen: Blood from Arm, Right Updated: 10/06/24 1941     Sodium 137 mmol/L      Potassium 3.9 mmol/L      Chloride 106 mmol/L      CO2 24 mmol/L      ANION GAP 7 mmol/L      BUN 14 mg/dL      Creatinine 0.72 mg/dL      Glucose 84 mg/dL      Calcium 8.9 mg/dL      eGFR 109 ml/min/1.73sq m     Narrative:      [employer] guidelines for Chronic Kidney Disease (CKD):     Stage 1 with normal or high GFR (GFR > 90 mL/min/1.73 square meters)    Stage 2 Mild CKD (GFR = 60-89 mL/min/1.73 square meters)    Stage 3A Moderate CKD (GFR = 45-59 mL/min/1.73 square meters)    Stage 3B Moderate CKD (GFR = 30-44 mL/min/1.73 square meters)    Stage 4 Severe CKD (GFR = 15-29 mL/min/1.73 square meters)    Stage 5 End Stage CKD (GFR <15 mL/min/1.73 square meters)  Note: GFR calculation is accurate only with a steady state creatinine    Hepatic function panel [[id number]]  (Abnormal) Collected: 10/06/24 1852    Lab Status: Final result Specimen: Blood from Arm, Right Updated: 10/06/24 1941     Total Bilirubin 1.05 mg/dL      Bilirubin, Direct 0.12 mg/dL      Alkaline Phosphatase 60 U/L      AST 25 U/L      ALT 18 U/L      Total Protein 6.9 g/dL      Albumin 3.9 g/dL     Lipase [[id number]]  (Normal) Collected: 10/06/24 1852    Lab Status: Final result Specimen: Blood from Arm, Right Updated: 10/06/24 1941     Lipase 28 u/L     Urine Microscopic [[id number]]  (Abnormal) Collected: 10/06/24 1810    Lab Status: Final result Specimen: Urine, Clean Catch Updated: 10/06/24 1830     RBC, UA 1-2 /hpf      WBC,  UA 4-10 /hpf      Epithelial Cells Occasional /hpf      Bacteria, UA Occasional /hpf      MUCUS THREADS Occasional     Transitional Epithelial Cells Present    CBC and differential [[id number]]  (Abnormal) Collected: 10/06/24 1812    Lab Status: Final result Specimen: Blood from Arm, Right Updated: 10/06/24 1819     WBC 8.26 Thousand/uL      RBC 5.14 Million/uL      Hemoglobin 14.6 g/dL      Hematocrit 42.5 %      MCV 83 fL      MCH 28.4 pg      MCHC 34.4 g/dL      RDW 12.8 %      MPV 9.8 fL      Platelets 235 Thousands/uL      nRBC 0 /100 WBCs      Segmented % 54 %      Immature Grans % 0 %      Lymphocytes % 34 %      Monocytes % 10 %      Eosinophils Relative 1 %      Basophils Relative 1 %      Absolute Neutrophils 4.46 Thousands/µL      Absolute Immature Grans 0.01 Thousand/uL      Absolute Lymphocytes 2.83 Thousands/µL      Absolute Monocytes 0.81 Thousand/µL      Eosinophils Absolute 0.10 Thousand/µL      Basophils Absolute 0.05 Thousands/µL     POCT pregnancy, urine [[id number]]  (Normal) Resulted: 10/06/24 1813    Lab Status: Final result Updated: 10/06/24 1813     EXT Preg Test, Ur Negative     Control Valid    Urine Macroscopic, POC [[id number]]  (Abnormal) Collected: 10/06/24 1810    Lab Status: Final result Specimen: Urine Updated: 10/06/24 1811     Color, UA Yellow     Clarity, UA Clear     pH, UA 7.0     Leukocytes, UA Trace     Nitrite, UA Negative     Protein, UA Negative mg/dl      Glucose, UA Negative mg/dl      Ketones, UA Negative mg/dl      Urobilinogen, UA 0.2 E.U./dl      Bilirubin, UA Negative     Occult Blood, UA Negative     Specific Gravity, UA 1.020    Narrative:      [facility] RESULT            CT abdomen pelvis with contrast   Final Interpretation by Brandon Roe MD (10/06 2044)      No acute findings in the abdomen or pelvis.      Large cystic mass involving the right lower quadrant and pelvis which appears to arise from the right adnexa measuring 14.4 x 7.4 x 4.9 cm. Another 3.6  cm complex appearing cyst is noted within the right ovary also described on a pelvic ultrasound    performed earlier the same day. Again, consider pelvic MRI for further characterization. Nonemergent OB/GYN consultation is recommended.      The appendix was not clearly visualized on this study given the right lower quadrant/pelvic cystic mass. If symptoms persist, a repeat CT abdomen/pelvis after the administration of oral contrast could be performed for further evaluation.      No free air or free fluid. No evidence of large or small bowel obstruction.         Workstation performed: [id number]         US pelvis complete w transvaginal   Final Interpretation by Wilson Goode MD (10/06 2000)      Unchanged complex 3.3 cm right ovarian cyst. This may or hemorrhagic cyst or endometrioma. Consider MRI for further characterization; otherwise, recommend follow-up ultrasound in 6 to 12 months to ensure stability or resolution. No evidence of torsion.      Left oophorectomy.      The study was marked in EPIC for immediate notification.               Workstation performed: [id number]             Procedures    ED Medication and Procedure Management   Prior to Admission Medications   Prescriptions Last Dose Informant Patient Reported? Taking?   multivitamin (THERAGRAN) TABS  Self Yes No   Sig: Take 1 tablet by mouth daily      Facility-Administered Medications: None     Discharge Medication List as of 10/6/2024 11:02 PM        START taking these medications    Details   naproxen (NAPROSYN) 375 mg tablet Take 1 tablet (375 mg total) by mouth 2 (two) times a day with meals, Starting Sun 10/6/2024, Normal           CONTINUE these medications which have NOT CHANGED    Details   multivitamin (THERAGRAN) TABS Take 1 tablet by mouth daily, Historical Med             ED SEPSIS DOCUMENTATION   Time reflects when diagnosis was documented in both MDM as applicable and the Disposition within this note       Time User Action Codes  Description Comment    10/6/2024 11:01 PM Ashvin Peck [R19.00] Pelvic mass     10/6/2024 11:01 PM Ashvin Peck [R10.9] Abdominal pain                  Ashvin Peck MD  10/07/24 0226

## 2024-10-10 NOTE — QUICK NOTE
Called patient early afternoon 10/7/2024.  She reports pain improved, has an appointment with Gyn/Onc next week.  Encouraged her to follow-up, reiterated return precautions for new or worsening symptoms.

## 2024-10-17 ENCOUNTER — OFFICE VISIT (OUTPATIENT)
Dept: GYNECOLOGIC ONCOLOGY | Facility: CLINIC | Age: 34
End: 2024-10-17
Payer: COMMERCIAL

## 2024-10-17 VITALS
RESPIRATION RATE: 18 BRPM | OXYGEN SATURATION: 99 % | BODY MASS INDEX: 23.41 KG/M2 | SYSTOLIC BLOOD PRESSURE: 114 MMHG | WEIGHT: 124 LBS | HEART RATE: 94 BPM | DIASTOLIC BLOOD PRESSURE: 72 MMHG | TEMPERATURE: 98.2 F | HEIGHT: 61 IN

## 2024-10-17 DIAGNOSIS — N83.201 CYST OF RIGHT OVARY: Primary | ICD-10-CM

## 2024-10-17 DIAGNOSIS — R19.00 PELVIC MASS: ICD-10-CM

## 2024-10-17 PROCEDURE — 99214 OFFICE O/P EST MOD 30 MIN: CPT | Performed by: OBSTETRICS & GYNECOLOGY

## 2024-10-17 NOTE — ASSESSMENT & PLAN NOTE
Patient is a very pleasant 33-year-old female with a right ovary in situ.  She is status post left oophorectomy for benign ovarian cyst in the past.  Patient has a long history of hemorrhagic ovarian cyst with pelvic pain.  Patient's most recent ultrasound reveals a small 3 cm ovarian complex cyst consistent with hemorrhagic ovarian cyst.  The CAT scan reveals a 12 cm fluid collection described as a cyst.  When I have reviewed the images this is most characteristic of a pelvic inclusion cyst.  Exam confirms this as this is not palpable.    I feel that the patient has had a hemorrhagic ovarian cyst with resultant inclusion cyst which is asymptomatic at this time.  The patient desires fertility.  Surgery would likely result in removal of ovary and further scarring.  She is asymptomatic at this time.    We have recommended follow-up ultrasound and exam in 2 months.  If patient becomes more symptomatic in the meantime she will call us.

## 2024-10-17 NOTE — PROGRESS NOTES
Assessment/Plan:    Problem List Items Addressed This Visit       Cyst of right ovary - Primary     Patient is a very pleasant 33-year-old female with a right ovary in situ.  She is status post left oophorectomy for benign ovarian cyst in the past.  Patient has a long history of hemorrhagic ovarian cyst with pelvic pain.  Patient's most recent ultrasound reveals a small 3 cm ovarian complex cyst consistent with hemorrhagic ovarian cyst.  The CAT scan reveals a 12 cm fluid collection described as a cyst.  When I have reviewed the images this is most characteristic of a pelvic inclusion cyst.  Exam confirms this as this is not palpable.    I feel that the patient has had a hemorrhagic ovarian cyst with resultant inclusion cyst which is asymptomatic at this time.  The patient desires fertility.  Surgery would likely result in removal of ovary and further scarring.  She is asymptomatic at this time.    We have recommended follow-up ultrasound and exam in 2 months.  If patient becomes more symptomatic in the meantime she will call us.          Other Visit Diagnoses       Pelvic mass        Relevant Orders    US abdomen and pelvis with transvaginal                CHIEF COMPLAINT: New large pelvic cyst            Patient ID: Alexandra Verdugo is a 33 y.o. female  Patient has a history of resection of a benign ovarian mass with significant pelvic adhesions.  She is since done well since that resection in 2 years.  Patient remains interested in fertility.  She had a episode of what she describes as ovarian cyst pain.  This lasted 3 days.  Patient was seen in the emergency department and underwent ultrasound and CAT scan.    Recent ultrasound was performed and reveals the following:  TECHNIQUE: Transabdominal pelvic ultrasound was performed in sagittal and transverse planes with a curvilinear transducer. Additional transvaginal imaging was performed to better evaluate the endometrium and ovaries. Imaging included volumetric sweeps  as   well as traditional still imaging technique.     FINDINGS:     UTERUS:  The uterus is anteverted in position, measuring 7.2 x 3.9 x 5.0 cm.  The uterus has a normal contour and echotexture.  The cervix appears within normal limits.     ENDOMETRIUM:  The endometrial echo complex has an AP caliber of 10.0 mm.  Appearance within normal limits.     OVARIES/ADNEXA:  Right ovary: 3.9 x 4.6 x 4.1 cm. 38.6 mL.  Ovarian Doppler flow is within normal limits.  3.3 x 2.7 x 2.8 cm complex cyst with low-level internal echoes. This is similar to the prior study.     Left ovary: Surgically absent.  No adnexal abnormality.        OTHER:  Small amount of simple free fluid in the pelvis, likely physiologic in this premenopausal female.     IMPRESSION:     Unchanged complex 3.3 cm right ovarian cyst. This may or hemorrhagic cyst or endometrioma. Consider MRI for further characterization; otherwise, recommend follow-up ultrasound in 6 to 12 months to ensure stability or resolution. No evidence of torsion.     Left oophorectomy.      Subsequent CAT scan reveals the following:    No acute findings in the abdomen or pelvis.     Large cystic mass involving the right lower quadrant and pelvis which appears to arise from the right adnexa measuring 14.4 x 7.4 x 4.9 cm. Another 3.6 cm complex appearing cyst is noted within the right ovary also described on a pelvic ultrasound   performed earlier the same day. Again, consider pelvic MRI for further characterization. Nonemergent OB/GYN consultation is recommended.     The appendix was not clearly visualized on this study given the right lower quadrant/pelvic cystic mass. If symptoms persist, a repeat CT abdomen/pelvis after the administration of oral contrast could be performed for further evaluation.     No free air or free fluid. No evidence of large or small bowel obstruction.  Follow-up ultrasound reveals the following:  Narrative & Impression  PELVIC ULTRASOUND, COMPLETE     INDICATION:  The patient is 33 years old. R pelvic pain, r/o torsion.     COMPARISON: Ultrasound 1/15/2024.     Of note patient has previously undergone open left salpingo-oophorectomy in 2022.  Patient had significant dense adhesions and the entirety of the operative field was not visualized due to dense adhesions.  The left tube and ovary were removed and final pathology report reveals the following:  Final Diagnosis  A.  Left ovary:     - Mucinous cystadenofibroma with dystrophic calcifications.     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     - Cystic follicles.     - Tubo-ovarian adhesions.        The patient's pain is subsequently resolved.  She remains interested in fertility.  I have reviewed her prior op report indicating a hostile abdomen and I have reviewed the images.    Today, the patient is doing well.  She denies significant abdominal pain, pelvic pain, nausea, vomiting, constipation, diarrhea, fevers, chills, or vaginal bleeding.           Review of Systems   Constitutional: Negative.    HENT: Negative.     Eyes: Negative.    Respiratory: Negative.     Cardiovascular: Negative.    Gastrointestinal: Negative.    Endocrine: Negative.    Genitourinary: Negative.    Musculoskeletal: Negative.    Skin: Negative.    Neurological: Negative.    Hematological: Negative.    Psychiatric/Behavioral: Negative.         Current Outpatient Medications   Medication Sig Dispense Refill    multivitamin (THERAGRAN) TABS Take 1 tablet by mouth daily      naproxen (NAPROSYN) 375 mg tablet Take 1 tablet (375 mg total) by mouth 2 (two) times a day with meals (Patient not taking: Reported on 10/17/2024) 20 tablet 0     No current facility-administered medications for this visit.       Allergies   Allergen Reactions    Latex Other (See Comments)     Condoms  Burning sensation wears normal underwear and can eat banans        Past Medical History:   Diagnosis Date    Anemia     iron def    History of transfusion 06/2020    Irritable bowel syndrome     PONV  "(postoperative nausea and vomiting)        Past Surgical History:   Procedure Laterality Date    COLONOSCOPY      EXAMINATION UNDER ANESTHESIA N/A 7/8/2020    Procedure: EXAM UNDER ANESTHESIA (EUA), diagnostic anoscopy with control of bleeding;  Surgeon: Ashvin Valles MD;  Location: [facility];  Service: Colorectal    EXPLORATORY LAPAROTOMY      SD COLONOSCOPY FLX DX W/COLLJ SPEC WHEN PFRMD N/A 8/1/2018    Procedure: COLONOSCOPY;  Surgeon: Ashvin Valles MD;  Location: [facility];  Service: Colorectal    SD EXPLORATORY LAPAROTOMY CELIOTOMY W/WO BIOPSY SPX N/A 3/25/2022    Procedure: EXPLORATORY LAPAROTOMY;  Surgeon: Kayode Hutton MD;  Location: [facility];  Service: Gynecology Oncology    SD HEMORRHOIDOPEXY STAPLING N/A 6/23/2020    Procedure: Hemorrhoidectomy, internal and external,3 column;  Surgeon: PARIS Lobo MD;  Location: [facility];  Service: Colorectal    SD OVARIAN CYSTECTOMY UNI/BI Left 3/25/2022    Procedure: OPEN OVARIAN CYSTECTOMY;  Surgeon: Kayode Hutton MD;  Location: [facility];  Service: Gynecology Oncology    STOMACH SURGERY      as an Infant    UPPER GASTROINTESTINAL ENDOSCOPY         OB History    No obstetric history on file.         Family History   Problem Relation Age of Onset    Diabetes Mother     Diabetes type II Mother     HIV Father     Colon cancer Neg Hx        The following portions of the patient's history were reviewed and updated as appropriate: allergies, current medications, past family history, past medical history, past social history, past surgical history, and problem list.      Objective:    Blood pressure 114/72, pulse 94, temperature 98.2 °F (36.8 °C), resp. rate 18, height 5' 1\" (1.549 m), weight 56.2 kg (124 lb), last menstrual period 09/19/2024, SpO2 99%.  Body mass index is 23.43 kg/m².    Physical Exam  Constitutional:       Appearance: She is well-developed.   HENT:      Head: Normocephalic and atraumatic.   Eyes:      Pupils: Pupils are " equal, round, and reactive to light.   Cardiovascular:      Rate and Rhythm: Normal rate and regular rhythm.      Heart sounds: Normal heart sounds.   Pulmonary:      Effort: Pulmonary effort is normal. No respiratory distress.      Breath sounds: Normal breath sounds.   Abdominal:      General: Bowel sounds are normal. There is no distension.      Palpations: Abdomen is soft. Abdomen is not rigid.      Tenderness: There is no abdominal tenderness. There is no guarding or rebound.   Genitourinary:     Comments: -Normal external female genitalia, normal Bartholin's and [place]'s glands                  -Normal midline urethral meatus. No lesions notes                  -Bladder without fullness mass or tenderness                  -Vagina without lesion or discharge No significant cystocele or rectocele noted                  -Cervix normal appearing without visible lesions                  -Uterus with normal contour, mobility. No tenderness,                  -Adnexae without  mass or tenderness                  - Anus without fissure of lesion  No palpable cyst  Musculoskeletal:         General: Normal range of motion.      Cervical back: Normal range of motion and neck supple.   Lymphadenopathy:      Cervical: No cervical adenopathy.      Upper Body:      Right upper body: No supraclavicular adenopathy.      Left upper body: No supraclavicular adenopathy.   Skin:     General: Skin is warm and dry.   Neurological:      Mental Status: She is alert and oriented to person, place, and time.   Psychiatric:         Behavior: Behavior normal.           Lab Results   Component Value Date     9.0 01/24/2022     Lab Results   Component Value Date    WBC 8.26 10/06/2024    HGB 14.6 10/06/2024    HCT 42.5 10/06/2024    MCV 83 10/06/2024     10/06/2024     Lab Results   Component Value Date     05/27/2015    K 3.9 10/06/2024     10/06/2024    CO2 24 10/06/2024    ANIONGAP 7 05/27/2015    BUN 14 10/06/2024     CREATININE 0.72 10/06/2024    GLUCOSE 103 05/27/2015    GLUF 83 01/20/2024    CALCIUM 8.9 10/06/2024    AST 25 10/06/2024    ALT 18 10/06/2024    ALKPHOS 60 10/06/2024    PROT 7.4 05/27/2015    BILITOT 1.18 (H) 05/27/2015    EGFR 109 10/06/2024        Trend:  Lab Results   Component Value Date     9.0 01/24/2022

## 2024-12-17 ENCOUNTER — HOSPITAL ENCOUNTER (OUTPATIENT)
Dept: ULTRASOUND IMAGING | Facility: MEDICAL CENTER | Age: 34
Discharge: HOME | End: 2024-12-17
Payer: COMMERCIAL

## 2024-12-17 DIAGNOSIS — R19.00 PELVIC MASS: ICD-10-CM

## 2024-12-17 PROCEDURE — 76830 TRANSVAGINAL US NON-OB: CPT

## 2024-12-17 PROCEDURE — 76856 US EXAM PELVIC COMPLETE: CPT

## 2025-01-02 ENCOUNTER — OFFICE VISIT (OUTPATIENT)
Dept: GYNECOLOGIC ONCOLOGY | Facility: CLINIC | Age: 35
End: 2025-01-02
Payer: COMMERCIAL

## 2025-01-02 VITALS
SYSTOLIC BLOOD PRESSURE: 142 MMHG | DIASTOLIC BLOOD PRESSURE: 68 MMHG | BODY MASS INDEX: 24.02 KG/M2 | RESPIRATION RATE: 18 BRPM | TEMPERATURE: 97.8 F | OXYGEN SATURATION: 96 % | HEIGHT: 61 IN | WEIGHT: 127.2 LBS | HEART RATE: 107 BPM

## 2025-01-02 DIAGNOSIS — N83.201 CYST OF RIGHT OVARY: Primary | ICD-10-CM

## 2025-01-02 PROCEDURE — 99214 OFFICE O/P EST MOD 30 MIN: CPT | Performed by: OBSTETRICS & GYNECOLOGY

## 2025-01-02 NOTE — ASSESSMENT & PLAN NOTE
Patient has a small asymptomatic right ovarian cyst approximately 3 cm consistent with hemorrhagic ovarian cyst versus endometrioma.  Additionally she she has a stable large 12 cm pelvic inclusion cyst likely related to endometriosis and scarring from prior surgery.    At this point we have discussed that treatment of choice would be related to her oral contraceptives however the patient is presently actively trying to achieve pregnancy.  She is working with Dr. Tameka Loredo and reports that she has a hysterosalpingogram set up.  If significant scarring is noted in the tubes consideration of open surgery removal of endometrioma and salpingostomy may be required    Otherwise we will see the patient back in 6 months for repeat follow-up and ultrasound

## 2025-01-02 NOTE — PROGRESS NOTES
Assessment/Plan:    Problem List Items Addressed This Visit       Cyst of right ovary - Primary    Patient has a small asymptomatic right ovarian cyst approximately 3 cm consistent with hemorrhagic ovarian cyst versus endometrioma.  Additionally she she has a stable large 12 cm pelvic inclusion cyst likely related to endometriosis and scarring from prior surgery.    At this point we have discussed that treatment of choice would be related to her oral contraceptives however the patient is presently actively trying to achieve pregnancy.  She is working with Dr. Tameka Loredo and reports that she has a hysterosalpingogram set up.  If significant scarring is noted in the tubes consideration of open surgery removal of endometrioma and salpingostomy may be required    Otherwise we will see the patient back in 6 months for repeat follow-up and ultrasound              CHIEF COMPLAINT: Pelvic inclusion cyst hemorrhagic ovarian cyst        Patient ID: Alexandra Verdugo is a 34 y.o. female  Patient is a very pleasant 34-year-old female who is desirous of fertility and has a hemorrhagic ovarian cyst with pelvic inclusion cyst.  She was seen approximately 2 months ago.  She presents today for follow-up with repeat ultrasound.  This reveals the following:  FINDINGS:     UTERUS:  The uterus is anteverted in position, measuring 7.1 x 3.4 x 5.2 cm.  The uterus has a normal contour and echotexture.  The cervix appears within normal limits.     ENDOMETRIUM:  The endometrial echo complex has an AP caliber of 5.0 mm.  Appearance within normal limits.     OVARIES/ADNEXA:  Right ovary: 4.7 x 3.7 x 3.9 cm. 35.8 mL.  Ovarian Doppler flow is within normal limits.  Focal pelvic lesion as follows:  Lesion: 1  - Situs: Right.  - Relation to ovary: Intraovarian.  - Size: 3.4 x 2.9 x 3.4 cm, present and slightly smaller compared to ultrasound from 4/19/2023.  - Category/Lesion Type: Homogeneous low level echoes or groundglass appearance, consistent  with an endometrioma.  - O-RADS Assessment Category: O-RADS 2 = Almost certainly benign  - Management recommendation: If not removed surgically, follow-up by ultrasound in 12 months.     REFERENCE: O-RADS US v2022     Focal pelvic lesion as follows:  Lesion: 2  - Situs: Right.  - Relation to ovary: Adnexal.  - Size: 12.1 x 13.4 x 4.8 cm, unchanged from priors.  - Category/Lesion Type: Cystic lesion with ovary at margin or suspended within (no mass effect and follows contour of adjacent organs or retroperitoneum), consistent with a peritoneal inclusion cyst.  - O-RADS Assessment Category: O-RADS 2 = Almost certainly benign  - Management recommendation: No further imaging workup or follow-up needed. Management by gynecologist.     REFERENCE: O-RADS US v2022     Left ovary: Surgically absent     OTHER:  See above regarding right pelvic peritoneal inclusion cyst.     IMPRESSION:     Persistent 3.4 x 2.9 x 3.4 cm right ovarian cyst with ultrasound appearance consistent with an endometrioma. O-RADS 2 = Almost certainly benign.  If not removed surgically, follow-up by ultrasound in 12 months.     There is also a 12.1 x 13.4 x 4.8 cm right pelvic peritoneal inclusion cyst. O-RADS 2 = Almost certainly benign.  No further imaging work-up or follow-up needed. Management by gynecologist.    Patient does have some intermittent ovulatory pain but is otherwise not in significant daily pain she is continuing to try to get pregnant and has been trying for over a year.  She is seeing Dr. Tameka Loredo and has a hysterosalpingogram set up                  The following portions of the patient's history were reviewed and updated as appropriate: allergies, current medications, past family history, past medical history, past social history, past surgical history, and problem list.    Review of Systems   Constitutional: Negative.    HENT: Negative.     Eyes: Negative.    Respiratory: Negative.     Cardiovascular: Negative.   "  Gastrointestinal: Negative.    Endocrine: Negative.    Genitourinary: Negative.    Musculoskeletal: Negative.    Skin: Negative.    Neurological: Negative.    Hematological: Negative.    Psychiatric/Behavioral: Negative.         Current Outpatient Medications   Medication Sig Dispense Refill    multivitamin (THERAGRAN) TABS Take 1 tablet by mouth daily (Patient not taking: Reported on 1/2/2025)      naproxen (NAPROSYN) 375 mg tablet Take 1 tablet (375 mg total) by mouth 2 (two) times a day with meals (Patient not taking: Reported on 10/17/2024) 20 tablet 0     No current facility-administered medications for this visit.           Objective:    Blood pressure 142/68, pulse (!) 107, temperature 97.8 °F (36.6 °C), temperature source Temporal, resp. rate 18, height 5' 1\" (1.549 m), weight 57.7 kg (127 lb 3.2 oz), SpO2 96%.  Body mass index is 24.03 kg/m².  Body surface area is 1.56 meters squared.    Physical Exam  Constitutional:       Appearance: She is well-developed.   HENT:      Head: Normocephalic and atraumatic.   Eyes:      Pupils: Pupils are equal, round, and reactive to light.   Cardiovascular:      Rate and Rhythm: Normal rate and regular rhythm.      Heart sounds: Normal heart sounds.   Pulmonary:      Effort: Pulmonary effort is normal. No respiratory distress.      Breath sounds: Normal breath sounds.   Abdominal:      General: Bowel sounds are normal. There is no distension.      Palpations: Abdomen is soft. Abdomen is not rigid.      Tenderness: There is no abdominal tenderness. There is no guarding or rebound.   Genitourinary:     Comments: -Normal external female genitalia, normal Bartholin's and [place]'s glands                  -Normal midline urethral meatus. No lesions notes                  -Bladder without fullness mass or tenderness                  -Vagina without lesion or discharge No significant cystocele or rectocele noted                  -Cervix normal appearing without visible lesions    "               -Uterus with normal contour, mobility. No tenderness,                  -Adnexae without  mass or tenderness                  - Anus without fissure of lesion    Inclusion cyst is nonpalpable    Musculoskeletal:         General: Normal range of motion.      Cervical back: Normal range of motion and neck supple.   Lymphadenopathy:      Cervical: No cervical adenopathy.      Upper Body:      Right upper body: No supraclavicular adenopathy.      Left upper body: No supraclavicular adenopathy.   Skin:     General: Skin is warm and dry.   Neurological:      Mental Status: She is alert and oriented to person, place, and time.   Psychiatric:         Behavior: Behavior normal.         Lab Results   Component Value Date     9.0 01/24/2022     Lab Results   Component Value Date     05/27/2015    K 3.9 10/06/2024     10/06/2024    CO2 24 10/06/2024    ANIONGAP 7 05/27/2015    BUN 14 10/06/2024    CREATININE 0.72 10/06/2024    GLUCOSE 103 05/27/2015    GLUF 83 01/20/2024    CALCIUM 8.9 10/06/2024    AST 25 10/06/2024    ALT 18 10/06/2024    ALKPHOS 60 10/06/2024    PROT 7.4 05/27/2015    BILITOT 1.18 (H) 05/27/2015    EGFR 109 10/06/2024     Lab Results   Component Value Date    WBC 8.26 10/06/2024    HGB 14.6 10/06/2024    HCT 42.5 10/06/2024    MCV 83 10/06/2024     10/06/2024     Lab Results   Component Value Date    NEUTROABS 4.46 10/06/2024        Trend:  Lab Results   Component Value Date     9.0 01/24/2022

## 2025-02-19 NOTE — ASSESSMENT & PLAN NOTE
Problem: Adult Inpatient Plan of Care  Goal: Plan of Care Review  Outcome: Progressing  Goal: Absence of Hospital-Acquired Illness or Injury  Outcome: Progressing     Problem: Skin Injury Risk Increased  Goal: Skin Health and Integrity  Outcome: Progressing     Problem: Wound  Goal: Optimal Pain Control and Function  Outcome: Progressing      Patient is a new diagnosis of persistent left ovarian cyst consistent with a dermoid  We discussed treatment options and have recommended ovarian cystectomy  We have discussed open verses laparoscopic surgery  Due to the patient's significant surgical history we have recommended open ovarian cystectomy  We discussed with the patient risks and benefits of the procedure including bleeding requiring transfusion infection and damage to local structures including bowel or bladder  The patient understands and accepts the risks of surgery and has signed an informed consent  She understands that she is at higher risk for bowel injury due to the prior surgeries  Preoperative chest x-ray EKG routine blood work will be performed  A bowel prep will be recommended with antibiotics due to the high risk of perforation

## 2025-03-26 ENCOUNTER — APPOINTMENT (OUTPATIENT)
Dept: LAB | Facility: MEDICAL CENTER | Age: 35
End: 2025-03-26

## 2025-03-26 ENCOUNTER — OFFICE VISIT (OUTPATIENT)
Dept: GASTROENTEROLOGY | Facility: MEDICAL CENTER | Age: 35
End: 2025-03-26
Payer: COMMERCIAL

## 2025-03-26 VITALS
DIASTOLIC BLOOD PRESSURE: 83 MMHG | BODY MASS INDEX: 23.33 KG/M2 | HEIGHT: 61 IN | WEIGHT: 123.6 LBS | SYSTOLIC BLOOD PRESSURE: 118 MMHG | OXYGEN SATURATION: 98 % | TEMPERATURE: 98.3 F | HEART RATE: 99 BPM

## 2025-03-26 DIAGNOSIS — R10.9 ABDOMINAL SPASMS: ICD-10-CM

## 2025-03-26 DIAGNOSIS — Z87.19 HISTORY OF HEMORRHOIDS: Primary | ICD-10-CM

## 2025-03-26 DIAGNOSIS — Z86.19 HISTORY OF HELICOBACTER PYLORI INFECTION: ICD-10-CM

## 2025-03-26 DIAGNOSIS — K59.1 FUNCTIONAL DIARRHEA: ICD-10-CM

## 2025-03-26 PROCEDURE — 99204 OFFICE O/P NEW MOD 45 MIN: CPT | Performed by: INTERNAL MEDICINE

## 2025-03-26 RX ORDER — DICYCLOMINE HYDROCHLORIDE 10 MG/1
10 CAPSULE ORAL EVERY 6 HOURS PRN
Qty: 60 CAPSULE | Refills: 0 | Status: SHIPPED | OUTPATIENT
Start: 2025-03-26 | End: 2025-04-03

## 2025-03-26 NOTE — ASSESSMENT & PLAN NOTE
High fiber diet   Metamucil if tolerated  try to avoid dairy for 2 weeks and if there is improvement then can take lactaid.   Probitoics - Align.   Stool tests and blood tests as listed.   If no improvement then consider colonoscopy for further evaluation.   Bentyl as needed for pain/ diarrhea.     Orders:    C-reactive protein; Future    Calprotectin,Fecal; Future    Clostridioides difficile toxin by PCR with EIA; Future    Comprehensive metabolic panel; Future    CBC and differential; Future    IgA; Future    Tissue Transglutaminase, IgA; Future    dicyclomine (BENTYL) 10 mg capsule; Take 1 capsule (10 mg total) by mouth every 6 (six) hours as needed (cramping)    TSH, 3rd generation; Future

## 2025-03-26 NOTE — PROGRESS NOTES
Name: Alexandra Verdugo      : 1990      MRN: [id number]  Encounter Provider: Haseeb Avendano MD  Encounter Date: 3/26/2025   Encounter department: [facility]  :  Assessment & Plan  History of hemorrhoids  Resolved.            Functional diarrhea  High fiber diet   Metamucil if tolerated  try to avoid dairy for 2 weeks and if there is improvement then can take lactaid.   Probitoics - Align.   Stool tests and blood tests as listed.   If no improvement then consider colonoscopy for further evaluation.   Bentyl as needed for pain/ diarrhea.     Orders:    C-reactive protein; Future    Calprotectin,Fecal; Future    Clostridioides difficile toxin by PCR with EIA; Future    Comprehensive metabolic panel; Future    CBC and differential; Future    IgA; Future    Tissue Transglutaminase, IgA; Future    dicyclomine (BENTYL) 10 mg capsule; Take 1 capsule (10 mg total) by mouth every 6 (six) hours as needed (cramping)    TSH, 3rd generation; Future    History of Helicobacter pylori infection    Orders:    H. pylori antigen, stool; Future    Abdominal spasms    Orders:    dicyclomine (BENTYL) 10 mg capsule; Take 1 capsule (10 mg total) by mouth every 6 (six) hours as needed (cramping)        History of Present Illness   Alexandra Verdugo is a 34 y.o. female who presents for diarrhea.   HPI      She had antibiotics in December after a procedure for tubal ligation. She has been having diarrhea since then. She has been taking prenatal and that gave her diarrhea but even after stopping them the diarrhea persisted.   Every day , doesn't matter with eating, she has 4 - 5 times/ day. It wakes up from sleep. She doesn't eat much before she sleeps.   She had a scant amount of blood.     She had a colonoscopy done for hemorrhoids with bleeding and she had surgery done.   She had constipation prior.     Review of Systems   Constitutional: Negative.    HENT: Negative.     Eyes: Negative.    Respiratory:  "Negative.     Cardiovascular: Negative.    Gastrointestinal:         See HPI   Endocrine: Negative.    Genitourinary: Negative.    Musculoskeletal: Negative.    Skin: Negative.    Allergic/Immunologic: Negative.    Neurological: Negative.    Hematological: Negative.    Psychiatric/Behavioral: Negative.     All other systems reviewed and are negative.   A complete review of systems is negative other than that noted above in the HPI.         Objective   /83 (BP Location: Left arm)   Pulse 99   Temp 98.3 °F (36.8 °C)   Ht 5' 1\" (1.549 m)   Wt 56.1 kg (123 lb 9.6 oz)   SpO2 98%   BMI 23.35 kg/m²     Physical Exam  Constitutional:       Appearance: Normal appearance. She is well-developed.   HENT:      Head: Normocephalic and atraumatic.   Eyes:      General: No scleral icterus.     Conjunctiva/sclera: Conjunctivae normal.      Pupils: Pupils are equal, round, and reactive to light.   Cardiovascular:      Rate and Rhythm: Normal rate and regular rhythm.      Heart sounds: Normal heart sounds.   Pulmonary:      Effort: Pulmonary effort is normal. No respiratory distress.      Breath sounds: Normal breath sounds.   Abdominal:      General: Bowel sounds are normal. There is no distension.      Palpations: Abdomen is soft. There is no mass.      Tenderness: There is no abdominal tenderness.      Hernia: No hernia is present.   Musculoskeletal:         General: Normal range of motion.      Cervical back: Normal range of motion.   Lymphadenopathy:      Cervical: No cervical adenopathy.   Skin:     General: Skin is warm.   Neurological:      Mental Status: She is alert and oriented to person, place, and time.   Psychiatric:         Behavior: Behavior normal.         Thought Content: Thought content normal.            Lab Results: I personally reviewed relevant lab results.       Results for orders placed during the hospital encounter of 07/05/20    Colonoscopy    Impression  No active bleeding seen to suggest high " volume GI bleed.  Mild ooze at hemorrhoidectomy site.  Clips placed for compression with no bleeding at completion of case.    RECOMMENDATION:  Repeat in As-needed basis      Ashvin Valles MD

## 2025-03-29 ENCOUNTER — APPOINTMENT (OUTPATIENT)
Dept: LAB | Facility: MEDICAL CENTER | Age: 35
End: 2025-03-29
Payer: COMMERCIAL

## 2025-03-29 DIAGNOSIS — K59.1 FUNCTIONAL DIARRHEA: ICD-10-CM

## 2025-03-29 LAB
ALBUMIN SERPL BCG-MCNC: 4.4 G/DL (ref 3.5–5)
ALP SERPL-CCNC: 77 U/L (ref 34–104)
ALT SERPL W P-5'-P-CCNC: 24 U/L (ref 7–52)
ANION GAP SERPL CALCULATED.3IONS-SCNC: 11 MMOL/L (ref 4–13)
AST SERPL W P-5'-P-CCNC: 20 U/L (ref 13–39)
BASOPHILS # BLD AUTO: 0.06 THOUSANDS/ÂΜL (ref 0–0.1)
BASOPHILS NFR BLD AUTO: 1 % (ref 0–1)
BILIRUB SERPL-MCNC: 1.83 MG/DL (ref 0.2–1)
BUN SERPL-MCNC: 13 MG/DL (ref 5–25)
CALCIUM SERPL-MCNC: 9.6 MG/DL (ref 8.4–10.2)
CHLORIDE SERPL-SCNC: 101 MMOL/L (ref 96–108)
CO2 SERPL-SCNC: 27 MMOL/L (ref 21–32)
CREAT SERPL-MCNC: 0.74 MG/DL (ref 0.6–1.3)
CRP SERPL QL: <1 MG/L
EOSINOPHIL # BLD AUTO: 0.07 THOUSAND/ÂΜL (ref 0–0.61)
EOSINOPHIL NFR BLD AUTO: 1 % (ref 0–6)
ERYTHROCYTE [DISTWIDTH] IN BLOOD BY AUTOMATED COUNT: 13 % (ref 11.6–15.1)
GFR SERPL CREATININE-BSD FRML MDRD: 105 ML/MIN/1.73SQ M
GLUCOSE P FAST SERPL-MCNC: 89 MG/DL (ref 65–99)
HCT VFR BLD AUTO: 45.9 % (ref 34.8–46.1)
HGB BLD-MCNC: 15.3 G/DL (ref 11.5–15.4)
IGA SERPL-MCNC: 352 MG/DL (ref 66–433)
IMM GRANULOCYTES # BLD AUTO: 0.01 THOUSAND/UL (ref 0–0.2)
IMM GRANULOCYTES NFR BLD AUTO: 0 % (ref 0–2)
LYMPHOCYTES # BLD AUTO: 2.12 THOUSANDS/ÂΜL (ref 0.6–4.47)
LYMPHOCYTES NFR BLD AUTO: 37 % (ref 14–44)
MCH RBC QN AUTO: 28.5 PG (ref 26.8–34.3)
MCHC RBC AUTO-ENTMCNC: 33.3 G/DL (ref 31.4–37.4)
MCV RBC AUTO: 86 FL (ref 82–98)
MONOCYTES # BLD AUTO: 0.43 THOUSAND/ÂΜL (ref 0.17–1.22)
MONOCYTES NFR BLD AUTO: 8 % (ref 4–12)
NEUTROPHILS # BLD AUTO: 3.05 THOUSANDS/ÂΜL (ref 1.85–7.62)
NEUTS SEG NFR BLD AUTO: 53 % (ref 43–75)
NRBC BLD AUTO-RTO: 0 /100 WBCS
PLATELET # BLD AUTO: 269 THOUSANDS/UL (ref 149–390)
PMV BLD AUTO: 10.6 FL (ref 8.9–12.7)
POTASSIUM SERPL-SCNC: 3.6 MMOL/L (ref 3.5–5.3)
PROT SERPL-MCNC: 7.6 G/DL (ref 6.4–8.4)
RBC # BLD AUTO: 5.36 MILLION/UL (ref 3.81–5.12)
SODIUM SERPL-SCNC: 139 MMOL/L (ref 135–147)
TSH SERPL DL<=0.05 MIU/L-ACNC: 1.1 UIU/ML (ref 0.45–4.5)
WBC # BLD AUTO: 5.74 THOUSAND/UL (ref 4.31–10.16)

## 2025-03-29 PROCEDURE — 86364 TISS TRNSGLTMNASE EA IG CLAS: CPT

## 2025-03-29 PROCEDURE — 86140 C-REACTIVE PROTEIN: CPT

## 2025-03-29 PROCEDURE — 84443 ASSAY THYROID STIM HORMONE: CPT

## 2025-03-29 PROCEDURE — 85025 COMPLETE CBC W/AUTO DIFF WBC: CPT

## 2025-03-29 PROCEDURE — 80053 COMPREHEN METABOLIC PANEL: CPT

## 2025-03-29 PROCEDURE — 36415 COLL VENOUS BLD VENIPUNCTURE: CPT

## 2025-03-29 PROCEDURE — 82784 ASSAY IGA/IGD/IGG/IGM EACH: CPT

## 2025-04-02 ENCOUNTER — RESULTS FOLLOW-UP (OUTPATIENT)
Dept: GASTROENTEROLOGY | Facility: CLINIC | Age: 35
End: 2025-04-02

## 2025-04-02 LAB — TTG IGA SER IA-ACNC: 1.5 U/ML (ref ?–10)

## 2025-04-02 NOTE — RESULT ENCOUNTER NOTE
Inform patient via [employer].  Please review the pathology/lab result of further discussion.    Copied from [employer] message :       Julien Morris,     Your celiac test was normal.    Best regards,     Haseeb Avendano MD

## 2025-04-03 DIAGNOSIS — R10.9 ABDOMINAL SPASMS: ICD-10-CM

## 2025-04-03 DIAGNOSIS — K59.1 FUNCTIONAL DIARRHEA: ICD-10-CM

## 2025-04-03 RX ORDER — DICYCLOMINE HYDROCHLORIDE 10 MG/1
10 CAPSULE ORAL EVERY 6 HOURS PRN
Qty: 360 CAPSULE | Refills: 1 | Status: SHIPPED | OUTPATIENT
Start: 2025-04-03 | End: 2025-05-03

## 2025-06-26 ENCOUNTER — OFFICE VISIT (OUTPATIENT)
Dept: GASTROENTEROLOGY | Facility: MEDICAL CENTER | Age: 35
End: 2025-06-26
Payer: COMMERCIAL

## 2025-06-26 ENCOUNTER — APPOINTMENT (OUTPATIENT)
Dept: LAB | Facility: MEDICAL CENTER | Age: 35
End: 2025-06-26
Attending: INTERNAL MEDICINE
Payer: COMMERCIAL

## 2025-06-26 VITALS
OXYGEN SATURATION: 98 % | TEMPERATURE: 98.8 F | HEIGHT: 61 IN | SYSTOLIC BLOOD PRESSURE: 112 MMHG | BODY MASS INDEX: 23.71 KG/M2 | WEIGHT: 125.6 LBS | HEART RATE: 80 BPM | DIASTOLIC BLOOD PRESSURE: 70 MMHG

## 2025-06-26 DIAGNOSIS — Z86.19 HISTORY OF HELICOBACTER PYLORI INFECTION: ICD-10-CM

## 2025-06-26 DIAGNOSIS — K59.1 FUNCTIONAL DIARRHEA: ICD-10-CM

## 2025-06-26 DIAGNOSIS — K59.1 FUNCTIONAL DIARRHEA: Primary | ICD-10-CM

## 2025-06-26 PROCEDURE — 83993 ASSAY FOR CALPROTECTIN FECAL: CPT

## 2025-06-26 PROCEDURE — 87493 C DIFF AMPLIFIED PROBE: CPT

## 2025-06-26 PROCEDURE — 87338 HPYLORI STOOL AG IA: CPT

## 2025-06-26 PROCEDURE — 99213 OFFICE O/P EST LOW 20 MIN: CPT | Performed by: NURSE PRACTITIONER

## 2025-06-26 NOTE — PROGRESS NOTES
Name: Alexandra Verdugo      : 1990      MRN: [id number]  Encounter Provider: NATALY Tomas  Encounter Date: 2025   Encounter department: [facility]  :  Assessment & Plan  Functional diarrhea  Reports her stools are less frequent and not loose all the time.  Reports she gets loose stools when she has dairy products.  BMs can be 2-3 times per day and some are formed stools.  No melena or hematochezia.  Her celiac testing was negative.  CRP was normal.  She does have the cups to do the stool studies but has not done them as of yet.  Feeling well overall.  She is not using Bentyl.  Reports that her abdominal pain has resolved.    Obtain stool studies to rule out infectious process  Follow-up in office in 6 months or sooner if needed  Can use Lactaid or lactose-free milk       History of Helicobacter pylori infection  Was treated in the past for H. pylori but has not obtained a stool to assess for eradication.    Stool for H. pylori           History of Present Illness   Alexandra Verdugo is a 34 y.o. female who presents for follow-up.  She was last seen by Dr. Avendano 3/25 for hemorrhoids, functional diarrhea, abdominal spasms and H. pylori infection.    HPI    Reports her stools are less frequent and not loose all the time.  Reports she gets loose stools when she has dairy products.  BMs can be 2-3 times per day and some are formed stools.  No melena or hematochezia.  Her celiac testing was negative.  CRP was normal.  She does have the cups to do the stool studies but has not done them as of yet.  Feeling well overall.  She is not using Bentyl.  Reports that her abdominal pain has resolved.      Labs 3/25-CMP normal other than total bili 1.83.  Does have a history of Gilbert's disease.,  CBC normal other than RBCs 5.36, TSH normal, CRP normal, celiac panel negative.  Stool testing was not completed.    Ultrasound pelvis with transvaginal-   Persistent 3.4 x 2.9 x  3.4 cm right ovarian cyst with ultrasound appearance consistent with an endometrioma. O-RADS 2 = Almost certainly benign.  If not removed surgically, follow-up by ultrasound in 12 months.     There is also a 12.1 x 13.4 x 4.8 cm right pelvic peritoneal inclusion cyst. O-RADS 2 = Almost certainly benign.  No further imaging work-up or follow-up needed. Management by gynecologist.         Prior EGD/colonoscopy    Colonoscopy 7/20-no active bleeding.  Mild ooze of hemorrhoidectomy site.    EGD 2/20-normal esophagus.  Small gastric polyp biopsied normal duodenum.    Colonoscopy 2/20-history of ileocolonic anastomosis.  Normal colon and terminal ileum.  Large hemorrhoids.          Review of Systems   Constitutional:  Negative for chills and fever.   HENT:  Negative for ear pain and sore throat.    Eyes:  Negative for pain and visual disturbance.   Respiratory:  Negative for cough and shortness of breath.    Cardiovascular:  Negative for chest pain and palpitations.   Gastrointestinal:  Negative for abdominal pain and vomiting.   Genitourinary:  Negative for dysuria and hematuria.   Musculoskeletal:  Negative for arthralgias and back pain.   Skin:  Negative for color change and rash.   Neurological:  Negative for seizures and syncope.   All other systems reviewed and are negative.   A complete review of systems is negative other than that noted above in the HPI.      Current Medications[1]  Objective   There were no vitals taken for this visit.    Physical Exam  Vitals and nursing note reviewed.   Constitutional:       General: She is not in acute distress.     Appearance: She is well-developed.   HENT:      Head: Normocephalic and atraumatic.     Eyes:      Conjunctiva/sclera: Conjunctivae normal.       Cardiovascular:      Rate and Rhythm: Normal rate and regular rhythm.      Heart sounds: No murmur heard.  Pulmonary:      Effort: Pulmonary effort is normal. No respiratory distress.      Breath sounds: Normal breath  sounds.   Abdominal:      General: Bowel sounds are normal.      Palpations: Abdomen is soft.      Tenderness: There is no abdominal tenderness.     Musculoskeletal:         General: No swelling.      Cervical back: Neck supple.     Skin:     General: Skin is warm and dry.      Capillary Refill: Capillary refill takes less than 2 seconds.     Neurological:      Mental Status: She is alert and oriented to person, place, and time.     Psychiatric:         Mood and Affect: Mood normal.            Lab Results: I personally reviewed relevant lab results.       Results for orders placed during the hospital encounter of 07/05/20    Colonoscopy    Impression  No active bleeding seen to suggest high volume GI bleed.  Mild ooze at hemorrhoidectomy site.  Clips placed for compression with no bleeding at completion of case.    RECOMMENDATION:  Repeat in As-needed basis      Ashvin Valles MD               [1]   Current Outpatient Medications   Medication Sig Dispense Refill    dicyclomine (BENTYL) 10 mg capsule TAKE 1 CAPSULE (10 MG TOTAL) BY MOUTH EVERY 6 (SIX) HOURS AS NEEDED (CRAMPING) 360 capsule 1    multivitamin (THERAGRAN) TABS Take 1 tablet by mouth daily (Patient not taking: Reported on 1/2/2025)      naproxen (NAPROSYN) 375 mg tablet Take 1 tablet (375 mg total) by mouth 2 (two) times a day with meals (Patient not taking: Reported on 10/17/2024) 20 tablet 0     No current facility-administered medications for this visit.

## 2025-06-26 NOTE — ASSESSMENT & PLAN NOTE
Reports her stools are less frequent and not loose all the time.  Reports she gets loose stools when she has dairy products.  BMs can be 2-3 times per day and some are formed stools.  No melena or hematochezia.  Her celiac testing was negative.  CRP was normal.  She does have the cups to do the stool studies but has not done them as of yet.  Feeling well overall.  She is not using Bentyl.  Reports that her abdominal pain has resolved.    Obtain stool studies to rule out infectious process  Follow-up in office in 6 months or sooner if needed  Can use Lactaid or lactose-free milk

## 2025-06-26 NOTE — H&P (VIEW-ONLY)
Name: Alexandra Verdugo      : 1990      MRN: [id number]  Encounter Provider: NATALY Tomas  Encounter Date: 2025   Encounter department: [facility]  :  Assessment & Plan  Functional diarrhea  Reports her stools are less frequent and not loose all the time.  Reports she gets loose stools when she has dairy products.  BMs can be 2-3 times per day and some are formed stools.  No melena or hematochezia.  Her celiac testing was negative.  CRP was normal.  She does have the cups to do the stool studies but has not done them as of yet.  Feeling well overall.  She is not using Bentyl.  Reports that her abdominal pain has resolved.    Obtain stool studies to rule out infectious process  Follow-up in office in 6 months or sooner if needed  Can use Lactaid or lactose-free milk       History of Helicobacter pylori infection  Was treated in the past for H. pylori but has not obtained a stool to assess for eradication.    Stool for H. pylori           History of Present Illness   Alexandra Verdugo is a 34 y.o. female who presents for follow-up.  She was last seen by Dr. Avendano 3/25 for hemorrhoids, functional diarrhea, abdominal spasms and H. pylori infection.    HPI    Reports her stools are less frequent and not loose all the time.  Reports she gets loose stools when she has dairy products.  BMs can be 2-3 times per day and some are formed stools.  No melena or hematochezia.  Her celiac testing was negative.  CRP was normal.  She does have the cups to do the stool studies but has not done them as of yet.  Feeling well overall.  She is not using Bentyl.  Reports that her abdominal pain has resolved.      Labs 3/25-CMP normal other than total bili 1.83.  Does have a history of Gilbert's disease.,  CBC normal other than RBCs 5.36, TSH normal, CRP normal, celiac panel negative.  Stool testing was not completed.    Ultrasound pelvis with transvaginal-   Persistent 3.4 x 2.9 x  3.4 cm right ovarian cyst with ultrasound appearance consistent with an endometrioma. O-RADS 2 = Almost certainly benign.  If not removed surgically, follow-up by ultrasound in 12 months.     There is also a 12.1 x 13.4 x 4.8 cm right pelvic peritoneal inclusion cyst. O-RADS 2 = Almost certainly benign.  No further imaging work-up or follow-up needed. Management by gynecologist.         Prior EGD/colonoscopy    Colonoscopy 7/20-no active bleeding.  Mild ooze of hemorrhoidectomy site.    EGD 2/20-normal esophagus.  Small gastric polyp biopsied normal duodenum.    Colonoscopy 2/20-history of ileocolonic anastomosis.  Normal colon and terminal ileum.  Large hemorrhoids.          Review of Systems   Constitutional:  Negative for chills and fever.   HENT:  Negative for ear pain and sore throat.    Eyes:  Negative for pain and visual disturbance.   Respiratory:  Negative for cough and shortness of breath.    Cardiovascular:  Negative for chest pain and palpitations.   Gastrointestinal:  Negative for abdominal pain and vomiting.   Genitourinary:  Negative for dysuria and hematuria.   Musculoskeletal:  Negative for arthralgias and back pain.   Skin:  Negative for color change and rash.   Neurological:  Negative for seizures and syncope.   All other systems reviewed and are negative.   A complete review of systems is negative other than that noted above in the HPI.      Current Medications[1]  Objective   There were no vitals taken for this visit.    Physical Exam  Vitals and nursing note reviewed.   Constitutional:       General: She is not in acute distress.     Appearance: She is well-developed.   HENT:      Head: Normocephalic and atraumatic.     Eyes:      Conjunctiva/sclera: Conjunctivae normal.       Cardiovascular:      Rate and Rhythm: Normal rate and regular rhythm.      Heart sounds: No murmur heard.  Pulmonary:      Effort: Pulmonary effort is normal. No respiratory distress.      Breath sounds: Normal breath  sounds.   Abdominal:      General: Bowel sounds are normal.      Palpations: Abdomen is soft.      Tenderness: There is no abdominal tenderness.     Musculoskeletal:         General: No swelling.      Cervical back: Neck supple.     Skin:     General: Skin is warm and dry.      Capillary Refill: Capillary refill takes less than 2 seconds.     Neurological:      Mental Status: She is alert and oriented to person, place, and time.     Psychiatric:         Mood and Affect: Mood normal.            Lab Results: I personally reviewed relevant lab results.       Results for orders placed during the hospital encounter of 07/05/20    Colonoscopy    Impression  No active bleeding seen to suggest high volume GI bleed.  Mild ooze at hemorrhoidectomy site.  Clips placed for compression with no bleeding at completion of case.    RECOMMENDATION:  Repeat in As-needed basis      Ashvin Valles MD               [1]   Current Outpatient Medications   Medication Sig Dispense Refill    dicyclomine (BENTYL) 10 mg capsule TAKE 1 CAPSULE (10 MG TOTAL) BY MOUTH EVERY 6 (SIX) HOURS AS NEEDED (CRAMPING) 360 capsule 1    multivitamin (THERAGRAN) TABS Take 1 tablet by mouth daily (Patient not taking: Reported on 1/2/2025)      naproxen (NAPROSYN) 375 mg tablet Take 1 tablet (375 mg total) by mouth 2 (two) times a day with meals (Patient not taking: Reported on 10/17/2024) 20 tablet 0     No current facility-administered medications for this visit.

## 2025-06-27 ENCOUNTER — TELEPHONE (OUTPATIENT)
Age: 35
End: 2025-06-27

## 2025-06-27 LAB
C DIFF TOX GENS STL QL NAA+PROBE: NEGATIVE
CALPROTECTIN STL-MCNC: 155 ÂΜG/G
H PYLORI AG STL QL IA: NEGATIVE

## 2025-06-27 NOTE — TELEPHONE ENCOUNTER
Called pt and informed pt the h.pylori testing was negative and she was wondering about the fecal edwin protectin test being elevated

## 2025-06-27 NOTE — TELEPHONE ENCOUNTER
Patients GI provider:  JIMMY Zarate    Number to return call: [phone]    Reason for call: Pt calling because she saw her labs came in and would like to discuss them when possible. I let her know I will tell the Providers team and reach back out as soon as they advise.

## 2025-06-30 ENCOUNTER — HOSPITAL ENCOUNTER (OUTPATIENT)
Dept: ULTRASOUND IMAGING | Facility: HOSPITAL | Age: 35
Discharge: HOME | End: 2025-06-30
Attending: OBSTETRICS & GYNECOLOGY
Payer: COMMERCIAL

## 2025-06-30 ENCOUNTER — PREP FOR PROCEDURE (OUTPATIENT)
Dept: GASTROENTEROLOGY | Facility: MEDICAL CENTER | Age: 35
End: 2025-06-30

## 2025-06-30 DIAGNOSIS — N83.201 CYST OF RIGHT OVARY: ICD-10-CM

## 2025-06-30 DIAGNOSIS — R19.7 DIARRHEA, UNSPECIFIED TYPE: Primary | ICD-10-CM

## 2025-06-30 PROCEDURE — 76830 TRANSVAGINAL US NON-OB: CPT

## 2025-06-30 PROCEDURE — 76856 US EXAM PELVIC COMPLETE: CPT

## 2025-06-30 RX ORDER — BISACODYL 5 MG/1
TABLET, DELAYED RELEASE ORAL
Qty: 4 TABLET | Refills: 0 | Status: SHIPPED | OUTPATIENT
Start: 2025-06-30

## 2025-06-30 RX ORDER — SODIUM CHLORIDE, SODIUM LACTATE, POTASSIUM CHLORIDE, CALCIUM CHLORIDE 600; 310; 30; 20 MG/100ML; MG/100ML; MG/100ML; MG/100ML
125 INJECTION, SOLUTION INTRAVENOUS CONTINUOUS
OUTPATIENT
Start: 2025-06-30

## 2025-06-30 RX ORDER — POLYETHYLENE GLYCOL 3350 17 G/17G
238 POWDER, FOR SOLUTION ORAL ONCE
Qty: 238 G | Refills: 0 | Status: SHIPPED | OUTPATIENT
Start: 2025-06-30 | End: 2025-06-30

## 2025-06-30 NOTE — TELEPHONE ENCOUNTER
Pt transferred to nurse line.  Reviewed recommendations/results.  Pt states she is having loose stools.   Pt is agreeable to having colonoscopy and repeat fecal calprotectin testing.     Please place orders.

## 2025-07-03 ENCOUNTER — OFFICE VISIT (OUTPATIENT)
Dept: GYNECOLOGIC ONCOLOGY | Facility: CLINIC | Age: 35
End: 2025-07-03
Payer: COMMERCIAL

## 2025-07-03 VITALS
SYSTOLIC BLOOD PRESSURE: 120 MMHG | OXYGEN SATURATION: 99 % | BODY MASS INDEX: 23.6 KG/M2 | HEART RATE: 92 BPM | WEIGHT: 125 LBS | TEMPERATURE: 98.2 F | RESPIRATION RATE: 18 BRPM | DIASTOLIC BLOOD PRESSURE: 72 MMHG | HEIGHT: 61 IN

## 2025-07-03 DIAGNOSIS — N83.201 CYST OF RIGHT OVARY: Primary | ICD-10-CM

## 2025-07-03 PROCEDURE — 99214 OFFICE O/P EST MOD 30 MIN: CPT | Performed by: OBSTETRICS & GYNECOLOGY

## 2025-07-03 NOTE — ASSESSMENT & PLAN NOTE
Patient is a very pleasant 34-year-old female with a history of a left salpingo-oophorectomy due to endometriosis.  Her right ovary had endometriosis on it but appears to have resolved.  She does have a inclusion cyst still present within the pelvis.  She is asymptomatic but still not getting pregnant.  She does follow with Dr. Tameka Loredo in the [facility] system.  I recommended that she follow-up with Dr. Loredo and get counseling as to surgery or IVF and possible referral to reproductive endocrinology.    With regard to the cyst if this is likely related to endometriosis the risk of malignancy is extremely small.  Will recommend a follow-up with ultrasound in another year.  Orders:    US pelvis complete w transvaginal; Future

## 2025-07-03 NOTE — LETTER
"July 3, 2025     Alfredo Atkinson MD  [address]   [address]  [facility] [address]    Patient: Alexandra Verdugo   YOB: 1990   Date of Visit: 7/3/2025       Dear Dr. Alfredo Atkinson MD:    Thank you for referring Alexandra Verdugo to me for evaluation. Below are my notes for this consultation.    If you have questions, please do not hesitate to call me. I look forward to following your patient along with you.         Sincerely,        Kayode Hutton MD        CC: No Recipients    Kayode Hutton MD  7/3/2025  8:21 AM  Sign when Signing Visit  Name: Alexandra Verdugo      : 1990      MRN: [id number]  Encounter Provider: Kayode Hutton MD  Encounter Date: 7/3/2025   Encounter department: [facility] [place]  :  Assessment & Plan  Cyst of right ovary  Patient is a very pleasant 34-year-old female with a history of a left salpingo-oophorectomy due to endometriosis.  Her right ovary had endometriosis on it but appears to have resolved.  She does have a inclusion cyst still present within the pelvis.  She is asymptomatic but still not getting pregnant.  She does follow with Dr. Tameka Loredo in the [facility] system.  I recommended that she follow-up with Dr. Loredo and get counseling as to surgery or IVF and possible referral to reproductive endocrinology.    With regard to the cyst if this is likely related to endometriosis the risk of malignancy is extremely small.  Will recommend a follow-up with ultrasound in another year.  Orders:    US pelvis complete w transvaginal; Future        {Oncology Survivorship (Optional):98017}    History of Present Illness{?Quick Links Encounters * My Last Note * Last Note in Specialty * Snapshot * Since Last Visit * History :90448}  Reason for Visit / CC: *** {Reason for Visit (Optional):28757::\"***\"}  Alexandra Verdugo is a 34 y.o. female   Patient is a pleasant 34-year-old female with a history of endometriosis.  " She is status post unilateral oophorectomy.  She has a remaining ovary with a large pelvic inclusion cyst surrounding it.  She presents today in follow-up.  She has recently undergone a pelvic ultrasound which reveals the following:  FINDINGS:     UTERUS:  The uterus is anteverted  in position, measuring 6.1  x 3.7  x 4.2  cm.  The uterus has a normal contour and echotexture.  The cervix appears within normal limits.     ENDOMETRIUM:  The endometrial echo complex has an AP caliber of 9.0   mm.  Normal thickness endometrium with trilaminar appearance..     OVARIES/ADNEXA:  Right ovary: 3.2  x 3.2  x 2.4  cm. 12.7  mL.  Ovarian Doppler flow is within normal limits.  Previously seen right ovarian endometrioma has resolved. Multiple right ovarian follicles are seen.     Focal pelvic lesion as follows:  Lesion: 1  - Situs: Right.  - Relation to ovary: Adnexal.  - Size: 9.9 x 5.1 x 6.9 cm., Previously 12.1 x 13.4 x 4.8 cm on pelvic ultrasound 12/17/2024  - Category/Lesion Type: Cystic lesion with ovary at margin or suspended within (no mass effect and follows contour of adjacent organs or retroperitoneum), consistent with a peritoneal inclusion cyst. There is echogenic 1.0 x 0.6 x 1.1 cm nodular lesion   along its margin with internal color flow (image 62), retrospectively also seen on multiple prior ultrasound examinations dating back to 11/15/2020 and likely representing postsurgical changes or adjacent mesenteric fat. Again seen are multiple debris   within the cystic lesion, and internal septations, stable.  - O-RADS Assessment Category: O-RADS 2 = Almost certainly benign     REFERENCE: O-RADS US v2022        Left ovary: Surgically absent  No suspicious adnexal abnormality.     OTHER:  No free fluid or loculated fluid collections.     IMPRESSION:  Interval resolution of previously seen right ovarian endometrioma     Right adnexal 9.9 cm cystic lesion is mildly decreased in size since December 2024 and grossly stable  "in appearance compared to December 2024, likely representing a peritoneal cyst, ORADS 2.. There is apparent echogenic 1.1 cm nodule along its margin,   seen on multiple prior ultrasound examinations dating back to 11/15/2020, representing benign etiology such as postsurgical granuloma or volume averaging with adjacent mesenteric fat. Given history of endometriosis, follow-up pelvic ultrasound in 6 to 12   months is recommended to assess for stability.    Today, the patient is doing well.  She denies significant abdominal pain, pelvic pain, nausea, vomiting, constipation, diarrhea, fevers, chills, or vaginal bleeding.  Patient has been trying to get pregnant for over a year.  She had an HSG in December which showed spillage of dye through her remaining tube per the patient.  The tube was said to be inflamed and the patient was started on antibiotics.  She is still not achieved pregnancy since that time.           Pertinent Medical History  ***  {There is no content from the last Pertinent Medical History section.}       Review of Systems   Constitutional: Negative.    HENT: Negative.     Eyes: Negative.    Respiratory: Negative.     Cardiovascular: Negative.    Gastrointestinal: Negative.    Endocrine: Negative.    Genitourinary: Negative.    Musculoskeletal: Negative.    Skin: Negative.    Neurological: Negative.    Hematological: Negative.    Psychiatric/Behavioral: Negative.      A complete review of systems is negative other than that noted above in the HPI.  {Select to insert medical history sections (Optional):08376}     Objective{?Quick Links Trend Vitals * Enter New Vitals * Results Review * Timeline (Adult) * Labs * Imaging * Cardiology * Procedures * Lung Cancer Screening * Surgical eConsent :50333}  /72 (BP Location: Left arm, Patient Position: Sitting, Cuff Size: Adult)   Pulse 92   Temp 98.2 °F (36.8 °C) (Temporal)   Resp 18   Ht 5' 1\" (1.549 m)   Wt 56.7 kg (125 lb)   SpO2 99%   BMI 23.62 " "kg/m²     Body mass index is 23.62 kg/m².  Pain Screening:  Pain Score: 0-No pain  ECOG   ***  Physical Exam  Constitutional:       Appearance: She is well-developed.   HENT:      Head: Normocephalic and atraumatic.     Eyes:      Pupils: Pupils are equal, round, and reactive to light.       Cardiovascular:      Rate and Rhythm: Normal rate and regular rhythm.      Heart sounds: Normal heart sounds.   Pulmonary:      Effort: Pulmonary effort is normal. No respiratory distress.      Breath sounds: Normal breath sounds.   Abdominal:      General: Bowel sounds are normal. There is no distension.      Palpations: Abdomen is soft. Abdomen is not rigid.      Tenderness: There is no abdominal tenderness. There is no guarding or rebound.   Genitourinary:     Comments: -Normal external female genitalia, normal Bartholin's and Laurel's glands                  -Normal midline urethral meatus. No lesions notes                  -Bladder without fullness mass or tenderness                  -Vagina without lesion or discharge No significant cystocele or rectocele noted                  -Cervix normal appearing without visible lesions                  -Uterus with normal contour, mobility. No tenderness,                  -Adnexae without  mass or tenderness                  - Anus without fissure of lesion      Musculoskeletal:         General: Normal range of motion.      Cervical back: Normal range of motion and neck supple.   Lymphadenopathy:      Cervical: No cervical adenopathy.      Upper Body:      Right upper body: No supraclavicular adenopathy.      Left upper body: No supraclavicular adenopathy.     Skin:     General: Skin is warm and dry.     Neurological:      Mental Status: She is alert and oriented to person, place, and time.     Psychiatric:         Behavior: Behavior normal.      ***    Labs: I have reviewed pertinent labs. { AMB ONCOLOGY LABS (Optional):49780}  No results found for: \"\"  Lab Results " "  Component Value Date/Time    Potassium 3.6 03/29/2025 07:39 AM    Chloride 101 03/29/2025 07:39 AM    CO2 27 03/29/2025 07:39 AM    BUN 13 03/29/2025 07:39 AM    Creatinine 0.74 03/29/2025 07:39 AM    Glucose, Fasting 89 03/29/2025 07:39 AM    Calcium 9.6 03/29/2025 07:39 AM    AST 20 03/29/2025 07:39 AM    ALT 24 03/29/2025 07:39 AM    Alkaline Phosphatase 77 03/29/2025 07:39 AM    eGFR 105 03/29/2025 07:39 AM     Lab Results   Component Value Date/Time    WBC 5.74 03/29/2025 07:39 AM    Hemoglobin 15.3 03/29/2025 07:39 AM    Hematocrit 45.9 03/29/2025 07:39 AM    MCV 86 03/29/2025 07:39 AM    Platelets 269 03/29/2025 07:39 AM     Lab Results   Component Value Date/Time    Absolute Neutrophils 3.05 03/29/2025 07:39 AM        Trend:  Lab Results   Component Value Date     9.0 01/24/2022       {Radiology Results Review (Optional):77258}  {Other Study Results Review (Optional):01406::\"No additional pertinent studies reviewed.\"}    {Administrative / Billing Section (Optional):12728}    "

## 2025-07-03 NOTE — LETTER
July 3, 2025     Alfredo Atkinson MD  [address]  [address]  [facility] [address]    Patient: Alexandra Verdugo   YOB: 1990   Date of Visit: 7/3/2025       Dear MD Tameka Pelayo DO:    Thank you for referring Alexandra Verdugo to me for evaluation. Below are my notes for this consultation.    If you have questions, please do not hesitate to call me. I look forward to following your patient along with you.         Sincerely,        Kayode Hutton MD        CC: DO Kayode Younger MD  7/3/2025  8:22 AM  Sign when Signing Visit  Name: Alexandra Verdugo      : 1990      MRN: [id number]  Encounter Provider: Kayode Hutton MD  Encounter Date: 7/3/2025   Encounter department: [facility] [place]  :  Assessment & Plan  Cyst of right ovary  Patient is a very pleasant 34-year-old female with a history of a left salpingo-oophorectomy due to endometriosis.  Her right ovary had endometriosis on it but appears to have resolved.  She does have a inclusion cyst still present within the pelvis.  She is asymptomatic but still not getting pregnant.  She does follow with Dr. Tameka Loredo in the [facility] system.  I recommended that she follow-up with Dr. Loredo and get counseling as to surgery or IVF and possible referral to reproductive endocrinology.    With regard to the cyst if this is likely related to endometriosis the risk of malignancy is extremely small.  Will recommend a follow-up with ultrasound in another year.  Orders:  •  US pelvis complete w transvaginal; Future            History of Present Illness  Reason for Visit / CC: Follow-up ovarian cyst   Alexandra Verdugo is a 34 y.o. female   Patient is a pleasant 34-year-old female with a history of endometriosis.  She is status post unilateral oophorectomy.  She has a remaining ovary with a large pelvic inclusion cyst surrounding it.  She presents today in  follow-up.  She has recently undergone a pelvic ultrasound which reveals the following:  FINDINGS:     UTERUS:  The uterus is anteverted  in position, measuring 6.1  x 3.7  x 4.2  cm.  The uterus has a normal contour and echotexture.  The cervix appears within normal limits.     ENDOMETRIUM:  The endometrial echo complex has an AP caliber of 9.0   mm.  Normal thickness endometrium with trilaminar appearance..     OVARIES/ADNEXA:  Right ovary: 3.2  x 3.2  x 2.4  cm. 12.7  mL.  Ovarian Doppler flow is within normal limits.  Previously seen right ovarian endometrioma has resolved. Multiple right ovarian follicles are seen.     Focal pelvic lesion as follows:  Lesion: 1  - Situs: Right.  - Relation to ovary: Adnexal.  - Size: 9.9 x 5.1 x 6.9 cm., Previously 12.1 x 13.4 x 4.8 cm on pelvic ultrasound 12/17/2024  - Category/Lesion Type: Cystic lesion with ovary at margin or suspended within (no mass effect and follows contour of adjacent organs or retroperitoneum), consistent with a peritoneal inclusion cyst. There is echogenic 1.0 x 0.6 x 1.1 cm nodular lesion   along its margin with internal color flow (image 62), retrospectively also seen on multiple prior ultrasound examinations dating back to 11/15/2020 and likely representing postsurgical changes or adjacent mesenteric fat. Again seen are multiple debris   within the cystic lesion, and internal septations, stable.  - O-RADS Assessment Category: O-RADS 2 = Almost certainly benign     REFERENCE: O-RADS US v2022        Left ovary: Surgically absent  No suspicious adnexal abnormality.     OTHER:  No free fluid or loculated fluid collections.     IMPRESSION:  Interval resolution of previously seen right ovarian endometrioma     Right adnexal 9.9 cm cystic lesion is mildly decreased in size since December 2024 and grossly stable in appearance compared to December 2024, likely representing a peritoneal cyst, ORADS 2.. There is apparent echogenic 1.1 cm nodule along its  "margin,   seen on multiple prior ultrasound examinations dating back to 11/15/2020, representing benign etiology such as postsurgical granuloma or volume averaging with adjacent mesenteric fat. Given history of endometriosis, follow-up pelvic ultrasound in 6 to 12   months is recommended to assess for stability.    Today, the patient is doing well.  She denies significant abdominal pain, pelvic pain, nausea, vomiting, constipation, diarrhea, fevers, chills, or vaginal bleeding.  Patient has been trying to get pregnant for over a year.  She had an HSG in December which showed spillage of dye through her remaining tube per the patient.  The tube was said to be inflamed and the patient was started on antibiotics.  She is still not achieved pregnancy since that time.           Pertinent Medical History           Review of Systems   Constitutional: Negative.    HENT: Negative.     Eyes: Negative.    Respiratory: Negative.     Cardiovascular: Negative.    Gastrointestinal: Negative.    Endocrine: Negative.    Genitourinary: Negative.    Musculoskeletal: Negative.    Skin: Negative.    Neurological: Negative.    Hematological: Negative.    Psychiatric/Behavioral: Negative.      A complete review of systems is negative other than that noted above in the HPI.       Objective  /72 (BP Location: Left arm, Patient Position: Sitting, Cuff Size: Adult)   Pulse 92   Temp 98.2 °F (36.8 °C) (Temporal)   Resp 18   Ht 5' 1\" (1.549 m)   Wt 56.7 kg (125 lb)   SpO2 99%   BMI 23.62 kg/m²     Body mass index is 23.62 kg/m².  Pain Screening:  Pain Score: 0-No pain  ECOG   0  Physical Exam  Constitutional:       Appearance: She is well-developed.   HENT:      Head: Normocephalic and atraumatic.     Eyes:      Pupils: Pupils are equal, round, and reactive to light.       Cardiovascular:      Rate and Rhythm: Normal rate and regular rhythm.      Heart sounds: Normal heart sounds.   Pulmonary:      Effort: Pulmonary effort is " "normal. No respiratory distress.      Breath sounds: Normal breath sounds.   Abdominal:      General: Bowel sounds are normal. There is no distension.      Palpations: Abdomen is soft. Abdomen is not rigid.      Tenderness: There is no abdominal tenderness. There is no guarding or rebound.   Genitourinary:     Comments: -Normal external female genitalia, normal Bartholin's and [place]'s glands                  -Normal midline urethral meatus. No lesions notes                  -Bladder without fullness mass or tenderness                  -Vagina without lesion or discharge No significant cystocele or rectocele noted                  -Cervix normal appearing without visible lesions                  -Uterus with normal contour, mobility. No tenderness,                  -Adnexae without  mass or tenderness                  - Anus without fissure of lesion      Musculoskeletal:         General: Normal range of motion.      Cervical back: Normal range of motion and neck supple.   Lymphadenopathy:      Cervical: No cervical adenopathy.      Upper Body:      Right upper body: No supraclavicular adenopathy.      Left upper body: No supraclavicular adenopathy.     Skin:     General: Skin is warm and dry.     Neurological:      Mental Status: She is alert and oriented to person, place, and time.     Psychiatric:         Behavior: Behavior normal.          Labs: I have reviewed pertinent labs.   No results found for: \"\"  Lab Results   Component Value Date/Time    Potassium 3.6 03/29/2025 07:39 AM    Chloride 101 03/29/2025 07:39 AM    CO2 27 03/29/2025 07:39 AM    BUN 13 03/29/2025 07:39 AM    Creatinine 0.74 03/29/2025 07:39 AM    Glucose, Fasting 89 03/29/2025 07:39 AM    Calcium 9.6 03/29/2025 07:39 AM    AST 20 03/29/2025 07:39 AM    ALT 24 03/29/2025 07:39 AM    Alkaline Phosphatase 77 03/29/2025 07:39 AM    eGFR 105 03/29/2025 07:39 AM     Lab Results   Component Value Date/Time    WBC 5.74 03/29/2025 07:39 AM    " Hemoglobin 15.3 03/29/2025 07:39 AM    Hematocrit 45.9 03/29/2025 07:39 AM    MCV 86 03/29/2025 07:39 AM    Platelets 269 03/29/2025 07:39 AM     Lab Results   Component Value Date/Time    Absolute Neutrophils 3.05 03/29/2025 07:39 AM        Trend:  Lab Results   Component Value Date     9.0 01/24/2022

## 2025-07-03 NOTE — PROGRESS NOTES
Name: Alexandra Verdugo      : 1990      MRN: [id number]  Encounter Provider: Kayode Hutton MD  Encounter Date: 7/3/2025   Encounter department: [facility] [place]  :  Assessment & Plan  Cyst of right ovary  Patient is a very pleasant 34-year-old female with a history of a left salpingo-oophorectomy due to endometriosis.  Her right ovary had endometriosis on it but appears to have resolved.  She does have a inclusion cyst still present within the pelvis.  She is asymptomatic but still not getting pregnant.  She does follow with Dr. Tameka Loredo in the [facility] system.  I recommended that she follow-up with Dr. Loredo and get counseling as to surgery or IVF and possible referral to reproductive endocrinology.    With regard to the cyst if this is likely related to endometriosis the risk of malignancy is extremely small.  Will recommend a follow-up with ultrasound in another year.  Orders:    US pelvis complete w transvaginal; Future            History of Present Illness   Reason for Visit / CC: Follow-up ovarian cyst   Alexandra Verdugo is a 34 y.o. female   Patient is a pleasant 34-year-old female with a history of endometriosis.  She is status post unilateral oophorectomy.  She has a remaining ovary with a large pelvic inclusion cyst surrounding it.  She presents today in follow-up.  She has recently undergone a pelvic ultrasound which reveals the following:  FINDINGS:     UTERUS:  The uterus is anteverted  in position, measuring 6.1  x 3.7  x 4.2  cm.  The uterus has a normal contour and echotexture.  The cervix appears within normal limits.     ENDOMETRIUM:  The endometrial echo complex has an AP caliber of 9.0   mm.  Normal thickness endometrium with trilaminar appearance..     OVARIES/ADNEXA:  Right ovary: 3.2  x 3.2  x 2.4  cm. 12.7  mL.  Ovarian Doppler flow is within normal limits.  Previously seen right ovarian endometrioma has resolved. Multiple right ovarian follicles  are seen.     Focal pelvic lesion as follows:  Lesion: 1  - Situs: Right.  - Relation to ovary: Adnexal.  - Size: 9.9 x 5.1 x 6.9 cm., Previously 12.1 x 13.4 x 4.8 cm on pelvic ultrasound 12/17/2024  - Category/Lesion Type: Cystic lesion with ovary at margin or suspended within (no mass effect and follows contour of adjacent organs or retroperitoneum), consistent with a peritoneal inclusion cyst. There is echogenic 1.0 x 0.6 x 1.1 cm nodular lesion   along its margin with internal color flow (image 62), retrospectively also seen on multiple prior ultrasound examinations dating back to 11/15/2020 and likely representing postsurgical changes or adjacent mesenteric fat. Again seen are multiple debris   within the cystic lesion, and internal septations, stable.  - O-RADS Assessment Category: O-RADS 2 = Almost certainly benign     REFERENCE: O-RADS US v2022        Left ovary: Surgically absent  No suspicious adnexal abnormality.     OTHER:  No free fluid or loculated fluid collections.     IMPRESSION:  Interval resolution of previously seen right ovarian endometrioma     Right adnexal 9.9 cm cystic lesion is mildly decreased in size since December 2024 and grossly stable in appearance compared to December 2024, likely representing a peritoneal cyst, ORADS 2.. There is apparent echogenic 1.1 cm nodule along its margin,   seen on multiple prior ultrasound examinations dating back to 11/15/2020, representing benign etiology such as postsurgical granuloma or volume averaging with adjacent mesenteric fat. Given history of endometriosis, follow-up pelvic ultrasound in 6 to 12   months is recommended to assess for stability.    Today, the patient is doing well.  She denies significant abdominal pain, pelvic pain, nausea, vomiting, constipation, diarrhea, fevers, chills, or vaginal bleeding.  Patient has been trying to get pregnant for over a year.  She had an HSG in December which showed spillage of dye through her remaining  "tube per the patient.  The tube was said to be inflamed and the patient was started on antibiotics.  She is still not achieved pregnancy since that time.           Pertinent Medical History            Review of Systems   Constitutional: Negative.    HENT: Negative.     Eyes: Negative.    Respiratory: Negative.     Cardiovascular: Negative.    Gastrointestinal: Negative.    Endocrine: Negative.    Genitourinary: Negative.    Musculoskeletal: Negative.    Skin: Negative.    Neurological: Negative.    Hematological: Negative.    Psychiatric/Behavioral: Negative.      A complete review of systems is negative other than that noted above in the HPI.       Objective   /72 (BP Location: Left arm, Patient Position: Sitting, Cuff Size: Adult)   Pulse 92   Temp 98.2 °F (36.8 °C) (Temporal)   Resp 18   Ht 5' 1\" (1.549 m)   Wt 56.7 kg (125 lb)   SpO2 99%   BMI 23.62 kg/m²     Body mass index is 23.62 kg/m².  Pain Screening:  Pain Score: 0-No pain  ECOG   0  Physical Exam  Constitutional:       Appearance: She is well-developed.   HENT:      Head: Normocephalic and atraumatic.     Eyes:      Pupils: Pupils are equal, round, and reactive to light.       Cardiovascular:      Rate and Rhythm: Normal rate and regular rhythm.      Heart sounds: Normal heart sounds.   Pulmonary:      Effort: Pulmonary effort is normal. No respiratory distress.      Breath sounds: Normal breath sounds.   Abdominal:      General: Bowel sounds are normal. There is no distension.      Palpations: Abdomen is soft. Abdomen is not rigid.      Tenderness: There is no abdominal tenderness. There is no guarding or rebound.   Genitourinary:     Comments: -Normal external female genitalia, normal Bartholin's and Energy's glands                  -Normal midline urethral meatus. No lesions notes                  -Bladder without fullness mass or tenderness                  -Vagina without lesion or discharge No significant cystocele or rectocele noted    " "              -Cervix normal appearing without visible lesions                  -Uterus with normal contour, mobility. No tenderness,                  -Adnexae without  mass or tenderness                  - Anus without fissure of lesion      Musculoskeletal:         General: Normal range of motion.      Cervical back: Normal range of motion and neck supple.   Lymphadenopathy:      Cervical: No cervical adenopathy.      Upper Body:      Right upper body: No supraclavicular adenopathy.      Left upper body: No supraclavicular adenopathy.     Skin:     General: Skin is warm and dry.     Neurological:      Mental Status: She is alert and oriented to person, place, and time.     Psychiatric:         Behavior: Behavior normal.          Labs: I have reviewed pertinent labs.   No results found for: \"\"  Lab Results   Component Value Date/Time    Potassium 3.6 03/29/2025 07:39 AM    Chloride 101 03/29/2025 07:39 AM    CO2 27 03/29/2025 07:39 AM    BUN 13 03/29/2025 07:39 AM    Creatinine 0.74 03/29/2025 07:39 AM    Glucose, Fasting 89 03/29/2025 07:39 AM    Calcium 9.6 03/29/2025 07:39 AM    AST 20 03/29/2025 07:39 AM    ALT 24 03/29/2025 07:39 AM    Alkaline Phosphatase 77 03/29/2025 07:39 AM    eGFR 105 03/29/2025 07:39 AM     Lab Results   Component Value Date/Time    WBC 5.74 03/29/2025 07:39 AM    Hemoglobin 15.3 03/29/2025 07:39 AM    Hematocrit 45.9 03/29/2025 07:39 AM    MCV 86 03/29/2025 07:39 AM    Platelets 269 03/29/2025 07:39 AM     Lab Results   Component Value Date/Time    Absolute Neutrophils 3.05 03/29/2025 07:39 AM        Trend:  Lab Results   Component Value Date     9.0 01/24/2022               "

## 2025-07-10 ENCOUNTER — ANESTHESIA (OUTPATIENT)
Dept: ANESTHESIOLOGY | Facility: HOSPITAL | Age: 35
End: 2025-07-10

## 2025-07-10 ENCOUNTER — ANESTHESIA EVENT (OUTPATIENT)
Dept: ANESTHESIOLOGY | Facility: HOSPITAL | Age: 35
End: 2025-07-10

## 2025-07-11 ENCOUNTER — TELEPHONE (OUTPATIENT)
Dept: GASTROENTEROLOGY | Facility: MEDICAL CENTER | Age: 35
End: 2025-07-11

## 2025-07-11 NOTE — TELEPHONE ENCOUNTER
Sent pt MYC message to confirm upcoming colonoscopy scheduled for 7/24/25 with  at [facility].

## 2025-07-23 ENCOUNTER — ANESTHESIA EVENT (OUTPATIENT)
Dept: GASTROENTEROLOGY | Facility: MEDICAL CENTER | Age: 35
End: 2025-07-23
Payer: COMMERCIAL

## 2025-07-24 ENCOUNTER — HOSPITAL ENCOUNTER (OUTPATIENT)
Dept: GASTROENTEROLOGY | Facility: MEDICAL CENTER | Age: 35
Setting detail: OUTPATIENT SURGERY
End: 2025-07-24
Attending: NURSE PRACTITIONER
Payer: COMMERCIAL

## 2025-07-24 ENCOUNTER — ANESTHESIA (OUTPATIENT)
Dept: GASTROENTEROLOGY | Facility: MEDICAL CENTER | Age: 35
End: 2025-07-24
Payer: COMMERCIAL

## 2025-07-24 VITALS
SYSTOLIC BLOOD PRESSURE: 107 MMHG | RESPIRATION RATE: 20 BRPM | DIASTOLIC BLOOD PRESSURE: 63 MMHG | TEMPERATURE: 98 F | OXYGEN SATURATION: 100 % | BODY MASS INDEX: 23.62 KG/M2 | WEIGHT: 125 LBS | HEART RATE: 76 BPM

## 2025-07-24 DIAGNOSIS — R19.7 DIARRHEA, UNSPECIFIED TYPE: ICD-10-CM

## 2025-07-24 LAB
EXT PREGNANCY TEST URINE: NEGATIVE
EXT. CONTROL: NORMAL

## 2025-07-24 PROCEDURE — 81025 URINE PREGNANCY TEST: CPT | Performed by: INTERNAL MEDICINE

## 2025-07-24 PROCEDURE — 45380 COLONOSCOPY AND BIOPSY: CPT | Performed by: INTERNAL MEDICINE

## 2025-07-24 PROCEDURE — 88305 TISSUE EXAM BY PATHOLOGIST: CPT | Performed by: PATHOLOGY

## 2025-07-24 RX ORDER — SODIUM CHLORIDE, SODIUM LACTATE, POTASSIUM CHLORIDE, CALCIUM CHLORIDE 600; 310; 30; 20 MG/100ML; MG/100ML; MG/100ML; MG/100ML
125 INJECTION, SOLUTION INTRAVENOUS CONTINUOUS
Status: SHIPPED | OUTPATIENT
Start: 2025-07-24

## 2025-07-24 RX ORDER — PROPOFOL 10 MG/ML
INJECTION, EMULSION INTRAVENOUS CONTINUOUS PRN
Status: DISCONTINUED | OUTPATIENT
Start: 2025-07-24 | End: 2025-07-24

## 2025-07-24 RX ORDER — PROPOFOL 10 MG/ML
INJECTION, EMULSION INTRAVENOUS AS NEEDED
Status: DISCONTINUED | OUTPATIENT
Start: 2025-07-24 | End: 2025-07-24

## 2025-07-24 RX ORDER — ONDANSETRON 2 MG/ML
INJECTION INTRAMUSCULAR; INTRAVENOUS AS NEEDED
Status: DISCONTINUED | OUTPATIENT
Start: 2025-07-24 | End: 2025-07-24

## 2025-07-24 RX ADMIN — SODIUM CHLORIDE, SODIUM LACTATE, POTASSIUM CHLORIDE, AND CALCIUM CHLORIDE 125 ML/HR: .6; .31; .03; .02 INJECTION, SOLUTION INTRAVENOUS at 15:13

## 2025-07-24 RX ADMIN — PROPOFOL 100 MCG/KG/MIN: 10 INJECTION, EMULSION INTRAVENOUS at 15:18

## 2025-07-24 RX ADMIN — ONDANSETRON 4 MG: 2 INJECTION INTRAMUSCULAR; INTRAVENOUS at 15:20

## 2025-07-24 RX ADMIN — PROPOFOL 100 MG: 10 INJECTION, EMULSION INTRAVENOUS at 15:18

## 2025-07-24 RX ADMIN — Medication 40 MG: at 15:24

## 2025-07-24 NOTE — ANESTHESIA PREPROCEDURE EVALUATION
Procedure:  COLONOSCOPY    Relevant Problems   ANESTHESIA   (+) PONV (postoperative nausea and vomiting)      NEURO/PSYCH   (+) PTSD (post-traumatic stress disorder)      FEN/Gastrointestinal   (+) Irritable bowel syndrome        Physical Exam    Airway     Mallampati score: II  TM Distance: >3 FB  Neck ROM: full      Cardiovascular      Dental   Comment: Denies loose teeth     Pulmonary      Neurological    She appears awake, alert and oriented x3.      Other Findings  post-pubertal.      Anesthesia Plan  ASA Score- 2     Anesthesia Type- IV sedation with anesthesia with ASA Monitors.         Additional Monitors:     Airway Plan: natural airway.           Plan Factors-Exercise tolerance (METS): >4 METS.    Chart reviewed. EKG reviewed.  Existing labs reviewed. Patient summary reviewed.    Patient is not a current smoker.              Induction- intravenous.    Postoperative Plan- .   Monitoring Plan - Monitoring plan - standard ASA monitoring          Informed Consent- Anesthetic plan and risks discussed with patient.  I personally reviewed this patient with the CRNA. Discussed and agreed on the Anesthesia Plan with the CRNA..      NPO Status:  Vitals Value Taken Time   Date of last liquid 07/24/25 07/24/25 13:55   Time of last liquid 1000 07/24/25 13:55   Date of last solid 07/22/25 07/24/25 13:55   Time of last solid 1600 07/24/25 13:55

## 2025-07-24 NOTE — ANESTHESIA POSTPROCEDURE EVALUATION
Post-Op Assessment Note    CV Status:  Stable    Pain management: adequate       Mental Status:  Alert and awake   Hydration Status:  Euvolemic   PONV Controlled:  Controlled   Airway Patency:  Patent     Post Op Vitals Reviewed: Yes    No anethesia notable event occurred.    Staff: CRNA           Last Filed PACU Vitals:  Vitals Value Taken Time   Temp     Pulse 84    /72    Resp 18    SpO2 99

## 2025-07-24 NOTE — INTERVAL H&P NOTE
H&P reviewed. After examining the patient I find no changes in the patients condition since the H&P had been written.    Vitals:    07/24/25 1400   BP: 131/81   Pulse: 95   Resp: 16   Temp: (!) 97 °F (36.1 °C)   SpO2: 99%

## 2025-07-25 ENCOUNTER — TELEPHONE (OUTPATIENT)
Age: 35
End: 2025-07-25

## 2025-07-25 NOTE — TELEPHONE ENCOUNTER
Pt calling re: she was reading her paperwork after her colonoscopy yesterday and saw that she has hemorrhoids. Pt is very concerned since she has had issues w/ hemorrhoids in the past and hemorrhaged at the time. Need blood transfusion. I read pt note where it says internal small hemorrhoids. I informed pt that she will receive a call w/ the results and when she does she should discus w/ provider about her concern. Pt verbally stated she understood and will discuss w/ provider.

## 2025-08-14 ENCOUNTER — RESULTS FOLLOW-UP (OUTPATIENT)
Dept: FAMILY MEDICINE CLINIC | Facility: CLINIC | Age: 35
End: 2025-08-14

## (undated) DEVICE — BETHLEHEM UNIVERSAL MINOR GEN: Brand: CARDINAL HEALTH

## (undated) DEVICE — TRAY FOLEY 16FR URIMETER SILICONE SURESTEP

## (undated) DEVICE — SUT CHROMIC 2-0 SH 27 IN G123H

## (undated) DEVICE — PLUMEPEN PRO 10FT

## (undated) DEVICE — 3M™ DURAPORE™ SURGICAL TAPE 1538-3, 3 INCH X 10 YARD (7,5CM X 9,1M), 4 ROLLS/BOX: Brand: 3M™ DURAPORE™

## (undated) DEVICE — SUT PLAIN 3-0 CTX 27 IN 873H

## (undated) DEVICE — GAUZE SPONGES,16 PLY: Brand: CURITY

## (undated) DEVICE — NEEDLE HYPO 22G X 1-1/2 IN

## (undated) DEVICE — SPONGE STICK WITH PVP-I: Brand: KENDALL

## (undated) DEVICE — GLOVE PI ULTRA TOUCH SZ.6.5

## (undated) DEVICE — PENCIL ELECTROSURG E-Z CLEAN -0035H

## (undated) DEVICE — INTENDED FOR TISSUE SEPARATION, AND OTHER PROCEDURES THAT REQUIRE A SHARP SURGICAL BLADE TO PUNCTURE OR CUT.: Brand: BARD-PARKER SAFETY BLADES SIZE 15, STERILE

## (undated) DEVICE — TELFA NON-ADHERENT ABSORBENT DRESSING: Brand: TELFA

## (undated) DEVICE — GLOVE PI ULTRA TOUCH SZ.7.5

## (undated) DEVICE — GLOVE PI ULTRA TOUCH SZ.7.0

## (undated) DEVICE — SUT STRATAFIX SPIRAL 3-0 PGA/PCL 30 X 30 CM SXMD2B408

## (undated) DEVICE — DRAPE LAPAROTOMY W/POUCHES

## (undated) DEVICE — DRAPE FLUID WARMER (BIRD BATH)

## (undated) DEVICE — ELECTRODE BLADE MOD  E-Z CLEAN 6.5IN -0014M

## (undated) DEVICE — 3000CC GUARDIAN II: Brand: GUARDIAN

## (undated) DEVICE — PAD GROUNDING ADULT

## (undated) DEVICE — GLOVE INDICATOR PI UNDERGLOVE SZ 7.5 BLUE

## (undated) DEVICE — GOWN ,SIRUS ,NONREINFORCED 4XL: Brand: MEDLINE

## (undated) DEVICE — 40529 DERMAPROX PAD 11'' X 15'' X 1'': Brand: 40529 DERMAPROX PAD 11'' X 15'' X 1''

## (undated) DEVICE — BASIC SINGLE BASIN 2-LF: Brand: MEDLINE INDUSTRIES, INC.

## (undated) DEVICE — LUBRICANT SURGILUBE TUBE 4 OZ  FLIP TOP

## (undated) DEVICE — ABDOMINAL PAD: Brand: DERMACEA

## (undated) DEVICE — SUT PDS II 1 TP-1 96 IN Z880G

## (undated) DEVICE — ADHESIVE SKIN HIGH VISCOSITY EXOFIN 1ML

## (undated) DEVICE — BETHLEHEM UNIVERSAL LAPAROTOMY: Brand: CARDINAL HEALTH

## (undated) DEVICE — PREMIUM DRY TRAY LF: Brand: MEDLINE INDUSTRIES, INC.

## (undated) DEVICE — TUBING SUCTION 5MM X 12 FT

## (undated) DEVICE — SUT VICRYL 0 REEL 54 IN J287G

## (undated) DEVICE — GELFOAM 100

## (undated) DEVICE — SUT VICRYL 0 UR-6 27 IN J603H

## (undated) DEVICE — NEEDLE 25G X 1 1/2

## (undated) DEVICE — CAUTERY TIP POLISHER: Brand: DEVON

## (undated) DEVICE — CHLORAPREP HI-LITE 26ML ORANGE

## (undated) DEVICE — MEDI-VAC YANK SUCT HNDL W/TPRD BULBOUS TIP: Brand: CARDINAL HEALTH

## (undated) DEVICE — POOLE SUCTION HANDLE: Brand: CARDINAL HEALTH

## (undated) DEVICE — TOWEL SET X-RAY